# Patient Record
Sex: FEMALE | Race: WHITE | NOT HISPANIC OR LATINO | Employment: OTHER | URBAN - NONMETROPOLITAN AREA
[De-identification: names, ages, dates, MRNs, and addresses within clinical notes are randomized per-mention and may not be internally consistent; named-entity substitution may affect disease eponyms.]

---

## 2017-02-07 ENCOUNTER — OFFICE VISIT (OUTPATIENT)
Dept: OBSTETRICS AND GYNECOLOGY | Facility: CLINIC | Age: 62
End: 2017-02-07

## 2017-02-07 VITALS
WEIGHT: 155 LBS | DIASTOLIC BLOOD PRESSURE: 88 MMHG | SYSTOLIC BLOOD PRESSURE: 140 MMHG | BODY MASS INDEX: 27.46 KG/M2 | HEIGHT: 63 IN

## 2017-02-07 DIAGNOSIS — Z00.00 ANNUAL PHYSICAL EXAM: Primary | ICD-10-CM

## 2017-02-07 DIAGNOSIS — N95.2 VAGINAL ATROPHY: ICD-10-CM

## 2017-02-07 DIAGNOSIS — Z78.9 NONSMOKER: ICD-10-CM

## 2017-02-07 PROCEDURE — 99396 PREV VISIT EST AGE 40-64: CPT | Performed by: OBSTETRICS & GYNECOLOGY

## 2017-02-07 PROCEDURE — 87624 HPV HI-RISK TYP POOLED RSLT: CPT | Performed by: OBSTETRICS & GYNECOLOGY

## 2017-02-07 PROCEDURE — G0123 SCREEN CERV/VAG THIN LAYER: HCPCS | Performed by: OBSTETRICS & GYNECOLOGY

## 2017-02-07 RX ORDER — AMLODIPINE BESYLATE 5 MG/1
5 TABLET ORAL DAILY
COMMUNITY
Start: 2016-12-02 | End: 2019-05-10 | Stop reason: DRUGHIGH

## 2017-02-07 RX ORDER — LEVOTHYROXINE SODIUM 100 MCG
88 TABLET ORAL DAILY
COMMUNITY
Start: 2016-12-02 | End: 2020-03-19

## 2017-02-07 RX ORDER — SPIRONOLACTONE 25 MG/1
25 TABLET ORAL DAILY
COMMUNITY
Start: 2016-12-02 | End: 2020-03-19 | Stop reason: SDUPTHER

## 2017-02-07 NOTE — PROGRESS NOTES
Subjective   Chief Complaint   Patient presents with   • Gynecologic Exam     pt here today for annual exam. pt voices no concerns.      Keshia Fletcher is a 61 y.o. year old No obstetric history on file. menopausal female presenting to be seen for her annual exam.  The patient denies any vaginal bleeding in the past 12 months. Hot flashes and night sweats are not a significant problem.    SEXUAL Hx:  She is sexually active.  In the past year there has not been new sexual partners.  No pain or discomfort with sex, uses Premarin vaginal cream twice weekly.    HEALTH Hx:  She exercises regularly: yes.  She wears her seat belt:not asked.  She has concerns about domestic violence: no.  The patient reports regular self breast exams: yes  She has noticed changes in height: no.    No Additional Complaints Reported    The following portions of the patient's history were reviewed and updated as appropriate:problem list, current medications, allergies, past family history, past medical history, past social history and past surgical history.    Smoking status: Not on file                        Smokeless status: Not on file                       Review of Systems   Constitutional: Negative for activity change and unexpected weight change.   HENT: Negative for congestion.    Respiratory: Negative for shortness of breath.    Cardiovascular: Negative for chest pain.   Gastrointestinal: Negative for abdominal pain, blood in stool, constipation and diarrhea.   Endocrine: Negative for cold intolerance and heat intolerance.   Genitourinary: Negative for difficulty urinating, dyspareunia, dysuria, pelvic pain, vaginal bleeding and vaginal discharge.   Musculoskeletal: Positive for arthralgias, back pain (mild), neck pain and neck stiffness.   Skin: Negative for rash.   Neurological: Negative for dizziness and headaches.   Psychiatric/Behavioral: Negative for sleep disturbance. The patient is not nervous/anxious.          Objective   Visit  "Vitals   • /88   • Ht 63\" (160 cm)   • Wt 155 lb (70.3 kg)   • BMI 27.46 kg/m2     Physical Exam   Constitutional: She is oriented to person, place, and time. She appears well-developed and well-nourished. No distress.   HENT:   Head: Normocephalic and atraumatic.   Eyes: EOM are normal.   Neck: Normal range of motion. Neck supple. No thyromegaly present.   Cardiovascular: Normal rate and regular rhythm.    No murmur heard.  Pulmonary/Chest: Effort normal and breath sounds normal. Right breast exhibits no inverted nipple and no mass. Left breast exhibits no inverted nipple and no mass.   Abdominal: Soft. She exhibits no distension. There is no tenderness.   Genitourinary: Vagina normal and uterus normal. Rectal exam shows anal tone normal. No breast tenderness or discharge. Pelvic exam was performed with patient supine. There is no tenderness or lesion on the right labia. There is no tenderness or lesion on the left labia. Cervix exhibits no motion tenderness and no discharge. Right adnexum displays no tenderness and no fullness. Left adnexum displays no tenderness and no fullness. No bleeding in the vagina. No vaginal discharge found.   Genitourinary Comments: Labia normal, no lesions noted.  Urethral meatus unremarkable, no prolapse of mucosa.  Anus/perineum normal.   Musculoskeletal: Normal range of motion. She exhibits no edema.   Neurological: She is alert and oriented to person, place, and time.   Skin: Skin is warm and dry.   Psychiatric: She has a normal mood and affect. Her behavior is normal. Judgment normal.   Nursing note and vitals reviewed.           Assessment & Plan    Keshia was seen today for gynecologic exam.    Diagnoses and all orders for this visit:    Annual physical exam: Labs per PCP.  Pt reports DEXA was normal only 1-2 years ago and reports colonoscopy is up-to-date.  Exam today unremarkable.  Mammogram being ordered at [facility], per her request.  -     Liquid-based Pap Smear, " Screening    Nonsmoker    BMI 27.0-27.9,adult    Vaginal atrophy  -     conjugated estrogens (PREMARIN) 0.625 MG/GM vaginal cream; Insert  into the vagina Daily. Use 1/2 gram, as directed, two nights weekly      This note was electronically signed.    Gillian Goff MD  2/7/2017  8:24 AM

## 2017-02-09 DIAGNOSIS — N95.2 VAGINAL ATROPHY: ICD-10-CM

## 2017-02-09 LAB
GEN CATEG CVX/VAG CYTO-IMP: NORMAL
LAB AP CASE REPORT: NORMAL
LAB AP GYN ADDITIONAL INFORMATION: NORMAL
LAB AP GYN OTHER FINDINGS: NORMAL
Lab: NORMAL
PATH INTERP SPEC-IMP: NORMAL
STAT OF ADQ CVX/VAG CYTO-IMP: NORMAL

## 2017-03-22 ENCOUNTER — TRANSCRIBE ORDERS (OUTPATIENT)
Dept: LAB | Facility: HOSPITAL | Age: 62
End: 2017-03-22

## 2017-03-22 ENCOUNTER — HOSPITAL ENCOUNTER (OUTPATIENT)
Dept: GENERAL RADIOLOGY | Facility: HOSPITAL | Age: 62
Discharge: HOME | End: 2017-03-22
Admitting: NURSE PRACTITIONER

## 2017-03-22 DIAGNOSIS — M54.5 LOW BACK PAIN, UNSPECIFIED BACK PAIN LATERALITY, UNSPECIFIED CHRONICITY, WITH SCIATICA PRESENCE UNSPECIFIED: Primary | ICD-10-CM

## 2017-03-22 DIAGNOSIS — M54.5 LOW BACK PAIN, UNSPECIFIED BACK PAIN LATERALITY, UNSPECIFIED CHRONICITY, WITH SCIATICA PRESENCE UNSPECIFIED: ICD-10-CM

## 2017-03-22 PROCEDURE — 72110 X-RAY EXAM L-2 SPINE 4/>VWS: CPT

## 2017-04-17 ENCOUNTER — TRANSCRIBE ORDERS (OUTPATIENT)
Dept: ADMINISTRATIVE | Facility: HOSPITAL | Age: 62
End: 2017-04-17

## 2017-04-17 DIAGNOSIS — N63.0 BREAST NODULE: Primary | ICD-10-CM

## 2017-04-21 ENCOUNTER — APPOINTMENT (OUTPATIENT)
Dept: MRI IMAGING | Facility: HOSPITAL | Age: 62
End: 2017-04-21
Attending: SPECIALIST

## 2017-04-24 ENCOUNTER — TRANSCRIBE ORDERS (OUTPATIENT)
Dept: ADMINISTRATIVE | Facility: HOSPITAL | Age: 62
End: 2017-04-24

## 2017-04-24 DIAGNOSIS — C50.912 NEOPLASM OF LEFT BREAST, PRIMARY TUMOR STAGING CATEGORY T4B: ULCERATION AND/OR IPSILATERAL SATELLITE NODULES AND/OR EDEMA (INCLUDING PEAU D'ORANGE) OF SKIN, EXCLUDING INFLAMMATORY CARCINOMA (HCC): Primary | ICD-10-CM

## 2017-04-25 ENCOUNTER — APPOINTMENT (OUTPATIENT)
Dept: MRI IMAGING | Facility: HOSPITAL | Age: 62
End: 2017-04-25
Attending: SPECIALIST

## 2017-04-25 ENCOUNTER — HOSPITAL ENCOUNTER (OUTPATIENT)
Dept: ULTRASOUND IMAGING | Facility: HOSPITAL | Age: 62
Discharge: HOME | End: 2017-04-25
Attending: SPECIALIST

## 2017-04-25 ENCOUNTER — HOSPITAL ENCOUNTER (OUTPATIENT)
Dept: MAMMOGRAPHY | Facility: HOSPITAL | Age: 62
Discharge: HOME | End: 2017-04-25
Attending: SPECIALIST | Admitting: SPECIALIST

## 2017-04-25 DIAGNOSIS — N63.0 BREAST NODULE: ICD-10-CM

## 2017-04-25 DIAGNOSIS — C50.912 NEOPLASM OF LEFT BREAST, PRIMARY TUMOR STAGING CATEGORY T4B: ULCERATION AND/OR IPSILATERAL SATELLITE NODULES AND/OR EDEMA (INCLUDING PEAU D'ORANGE) OF SKIN, EXCLUDING INFLAMMATORY CARCINOMA (HCC): ICD-10-CM

## 2017-04-25 PROCEDURE — G0206 DX MAMMO INCL CAD UNI: HCPCS

## 2017-04-25 PROCEDURE — 76642 ULTRASOUND BREAST LIMITED: CPT

## 2017-04-25 PROCEDURE — G0279 TOMOSYNTHESIS, MAMMO: HCPCS

## 2017-04-26 ENCOUNTER — TRANSCRIBE ORDERS (OUTPATIENT)
Dept: ADMINISTRATIVE | Facility: HOSPITAL | Age: 62
End: 2017-04-26

## 2017-04-26 DIAGNOSIS — N63.0 LUMP OR MASS IN BREAST: Primary | ICD-10-CM

## 2017-05-03 ENCOUNTER — APPOINTMENT (OUTPATIENT)
Dept: LAB | Facility: HOSPITAL | Age: 62
End: 2017-05-03

## 2017-05-03 ENCOUNTER — HOSPITAL ENCOUNTER (OUTPATIENT)
Dept: ULTRASOUND IMAGING | Facility: HOSPITAL | Age: 62
Discharge: HOME | End: 2017-05-03
Attending: SPECIALIST | Admitting: SPECIALIST

## 2017-05-03 ENCOUNTER — HOSPITAL ENCOUNTER (OUTPATIENT)
Dept: MAMMOGRAPHY | Facility: HOSPITAL | Age: 62
Discharge: HOME | End: 2017-05-03
Attending: SPECIALIST

## 2017-05-03 DIAGNOSIS — N63.0 LUMP OR MASS IN BREAST: ICD-10-CM

## 2017-05-03 PROCEDURE — 88305 TISSUE EXAM BY PATHOLOGIST: CPT | Performed by: SPECIALIST

## 2017-05-03 RX ORDER — LIDOCAINE HYDROCHLORIDE 10 MG/ML
10 INJECTION, SOLUTION INFILTRATION; PERINEURAL ONCE
Status: DISCONTINUED | OUTPATIENT
Start: 2017-05-03 | End: 2017-05-25 | Stop reason: HOSPADM

## 2017-05-03 RX ORDER — LIDOCAINE HYDROCHLORIDE AND EPINEPHRINE 10; 10 MG/ML; UG/ML
10 INJECTION, SOLUTION INFILTRATION; PERINEURAL ONCE
Status: DISCONTINUED | OUTPATIENT
Start: 2017-05-03 | End: 2017-05-25

## 2017-05-09 LAB
CYTO UR: NORMAL
LAB AP CASE REPORT: NORMAL
LAB AP CLINICAL INFORMATION: NORMAL
Lab: NORMAL
PATH REPORT.FINAL DX SPEC: NORMAL
PATH REPORT.GROSS SPEC: NORMAL

## 2017-05-22 ENCOUNTER — TRANSCRIBE ORDERS (OUTPATIENT)
Dept: ADMINISTRATIVE | Facility: HOSPITAL | Age: 62
End: 2017-05-22

## 2017-05-22 DIAGNOSIS — N63.0 BREAST NODULE: Primary | ICD-10-CM

## 2017-05-23 ENCOUNTER — APPOINTMENT (OUTPATIENT)
Dept: PREADMISSION TESTING | Facility: HOSPITAL | Age: 62
End: 2017-05-23

## 2017-05-23 VITALS
DIASTOLIC BLOOD PRESSURE: 68 MMHG | HEIGHT: 64 IN | OXYGEN SATURATION: 99 % | RESPIRATION RATE: 14 BRPM | HEART RATE: 72 BPM | SYSTOLIC BLOOD PRESSURE: 148 MMHG

## 2017-05-23 LAB
ALBUMIN SERPL-MCNC: 4.6 G/DL (ref 3.5–5)
ALBUMIN/GLOB SERPL: 1.4 G/DL (ref 1.1–2.5)
ALP SERPL-CCNC: 125 U/L (ref 24–120)
ALT SERPL W P-5'-P-CCNC: 29 U/L (ref 0–54)
ANION GAP SERPL CALCULATED.3IONS-SCNC: 13 MMOL/L (ref 4–13)
AST SERPL-CCNC: 26 U/L (ref 7–45)
BASOPHILS # BLD AUTO: 0.04 10*3/MM3 (ref 0–0.2)
BASOPHILS NFR BLD AUTO: 0.6 % (ref 0–2)
BILIRUB SERPL-MCNC: 0.9 MG/DL (ref 0.1–1)
BUN BLD-MCNC: 14 MG/DL (ref 5–21)
BUN/CREAT SERPL: 14.6 (ref 7–25)
CALCIUM SPEC-SCNC: 10 MG/DL (ref 8.4–10.4)
CHLORIDE SERPL-SCNC: 102 MMOL/L (ref 98–110)
CO2 SERPL-SCNC: 27 MMOL/L (ref 24–31)
CREAT BLD-MCNC: 0.96 MG/DL (ref 0.5–1.4)
DEPRECATED RDW RBC AUTO: 39.4 FL (ref 40–54)
EOSINOPHIL # BLD AUTO: 0.09 10*3/MM3 (ref 0–0.7)
EOSINOPHIL NFR BLD AUTO: 1.4 % (ref 0–4)
ERYTHROCYTE [DISTWIDTH] IN BLOOD BY AUTOMATED COUNT: 12.4 % (ref 12–15)
GFR SERPL CREATININE-BSD FRML MDRD: 59 ML/MIN/1.73
GLOBULIN UR ELPH-MCNC: 3.2 GM/DL
GLUCOSE BLD-MCNC: 91 MG/DL (ref 70–100)
HCT VFR BLD AUTO: 40.7 % (ref 37–47)
HGB BLD-MCNC: 14.1 G/DL (ref 12–16)
IMM GRANULOCYTES # BLD: 0.01 10*3/MM3 (ref 0–0.03)
IMM GRANULOCYTES NFR BLD: 0.2 % (ref 0–5)
LYMPHOCYTES # BLD AUTO: 1.88 10*3/MM3 (ref 0.72–4.86)
LYMPHOCYTES NFR BLD AUTO: 29.1 % (ref 15–45)
MCH RBC QN AUTO: 30.9 PG (ref 28–32)
MCHC RBC AUTO-ENTMCNC: 34.6 G/DL (ref 33–36)
MCV RBC AUTO: 89.1 FL (ref 82–98)
MONOCYTES # BLD AUTO: 0.55 10*3/MM3 (ref 0.19–1.3)
MONOCYTES NFR BLD AUTO: 8.5 % (ref 4–12)
NEUTROPHILS # BLD AUTO: 3.89 10*3/MM3 (ref 1.87–8.4)
NEUTROPHILS NFR BLD AUTO: 60.2 % (ref 39–78)
PLATELET # BLD AUTO: 138 10*3/MM3 (ref 130–400)
PMV BLD AUTO: 10.9 FL (ref 6–12)
POTASSIUM BLD-SCNC: 4.1 MMOL/L (ref 3.5–5.3)
PROT SERPL-MCNC: 7.8 G/DL (ref 6.3–8.7)
RBC # BLD AUTO: 4.57 10*6/MM3 (ref 4.2–5.4)
SODIUM BLD-SCNC: 142 MMOL/L (ref 135–145)
WBC NRBC COR # BLD: 6.46 10*3/MM3 (ref 4.8–10.8)

## 2017-05-23 PROCEDURE — 93010 ELECTROCARDIOGRAM REPORT: CPT | Performed by: INTERNAL MEDICINE

## 2017-05-23 RX ORDER — ACETAMINOPHEN 160 MG
2000 TABLET,DISINTEGRATING ORAL DAILY
COMMUNITY

## 2017-05-23 RX ORDER — FAMOTIDINE 10 MG
10 TABLET ORAL AS NEEDED
COMMUNITY
End: 2021-08-24 | Stop reason: HOSPADM

## 2017-05-25 ENCOUNTER — HOSPITAL ENCOUNTER (OUTPATIENT)
Dept: ULTRASOUND IMAGING | Facility: HOSPITAL | Age: 62
Discharge: HOME | End: 2017-05-25
Attending: SPECIALIST

## 2017-05-25 ENCOUNTER — ANESTHESIA EVENT (OUTPATIENT)
Dept: PERIOP | Facility: HOSPITAL | Age: 62
End: 2017-05-25

## 2017-05-25 ENCOUNTER — HOSPITAL ENCOUNTER (OUTPATIENT)
Facility: HOSPITAL | Age: 62
Setting detail: HOSPITAL OUTPATIENT SURGERY
Discharge: HOME | End: 2017-05-25
Attending: SPECIALIST | Admitting: SPECIALIST

## 2017-05-25 ENCOUNTER — ANESTHESIA (OUTPATIENT)
Dept: PERIOP | Facility: HOSPITAL | Age: 62
End: 2017-05-25

## 2017-05-25 ENCOUNTER — HOSPITAL ENCOUNTER (OUTPATIENT)
Dept: MAMMOGRAPHY | Facility: HOSPITAL | Age: 62
Discharge: HOME | End: 2017-05-25
Attending: SPECIALIST

## 2017-05-25 VITALS
SYSTOLIC BLOOD PRESSURE: 131 MMHG | DIASTOLIC BLOOD PRESSURE: 69 MMHG | TEMPERATURE: 97.4 F | OXYGEN SATURATION: 98 % | RESPIRATION RATE: 16 BRPM | HEART RATE: 54 BPM

## 2017-05-25 DIAGNOSIS — N63.20 LUMP OF BREAST, LEFT: ICD-10-CM

## 2017-05-25 DIAGNOSIS — N63.0 BREAST NODULE: ICD-10-CM

## 2017-05-25 PROCEDURE — 88307 TISSUE EXAM BY PATHOLOGIST: CPT | Performed by: SPECIALIST

## 2017-05-25 PROCEDURE — 25010000002 FENTANYL CITRATE (PF) 250 MCG/5ML SOLUTION: Performed by: NURSE ANESTHETIST, CERTIFIED REGISTERED

## 2017-05-25 PROCEDURE — 25010000002 PROPOFOL 10 MG/ML EMULSION: Performed by: NURSE ANESTHETIST, CERTIFIED REGISTERED

## 2017-05-25 PROCEDURE — 76098 X-RAY EXAM SURGICAL SPECIMEN: CPT

## 2017-05-25 PROCEDURE — 25010000002 MIDAZOLAM PER 1 MG: Performed by: ANESTHESIOLOGY

## 2017-05-25 PROCEDURE — 88342 IMHCHEM/IMCYTCHM 1ST ANTB: CPT | Performed by: SPECIALIST

## 2017-05-25 RX ORDER — MIDAZOLAM HYDROCHLORIDE 1 MG/ML
1 INJECTION INTRAMUSCULAR; INTRAVENOUS
Status: DISCONTINUED | OUTPATIENT
Start: 2017-05-25 | End: 2017-05-25 | Stop reason: HOSPADM

## 2017-05-25 RX ORDER — MAGNESIUM HYDROXIDE 1200 MG/15ML
LIQUID ORAL AS NEEDED
Status: DISCONTINUED | OUTPATIENT
Start: 2017-05-25 | End: 2017-05-25 | Stop reason: HOSPADM

## 2017-05-25 RX ORDER — HYDROCODONE BITARTRATE AND ACETAMINOPHEN 5; 325 MG/1; MG/1
1-2 TABLET ORAL EVERY 4 HOURS PRN
Qty: 30 TABLET | Refills: 0 | Status: SHIPPED | OUTPATIENT
Start: 2017-05-25 | End: 2018-02-08

## 2017-05-25 RX ORDER — METOCLOPRAMIDE HYDROCHLORIDE 5 MG/ML
5 INJECTION INTRAMUSCULAR; INTRAVENOUS
Status: DISCONTINUED | OUTPATIENT
Start: 2017-05-25 | End: 2017-05-25 | Stop reason: HOSPADM

## 2017-05-25 RX ORDER — HYDRALAZINE HYDROCHLORIDE 20 MG/ML
5 INJECTION INTRAMUSCULAR; INTRAVENOUS
Status: DISCONTINUED | OUTPATIENT
Start: 2017-05-25 | End: 2017-05-25 | Stop reason: HOSPADM

## 2017-05-25 RX ORDER — MIDAZOLAM HYDROCHLORIDE 1 MG/ML
2 INJECTION INTRAMUSCULAR; INTRAVENOUS
Status: DISCONTINUED | OUTPATIENT
Start: 2017-05-25 | End: 2017-05-25 | Stop reason: HOSPADM

## 2017-05-25 RX ORDER — FLUMAZENIL 0.1 MG/ML
0.2 INJECTION INTRAVENOUS AS NEEDED
Status: DISCONTINUED | OUTPATIENT
Start: 2017-05-25 | End: 2017-05-25 | Stop reason: HOSPADM

## 2017-05-25 RX ORDER — MEPERIDINE HYDROCHLORIDE 25 MG/ML
12.5 INJECTION INTRAMUSCULAR; INTRAVENOUS; SUBCUTANEOUS
Status: DISCONTINUED | OUTPATIENT
Start: 2017-05-25 | End: 2017-05-25 | Stop reason: HOSPADM

## 2017-05-25 RX ORDER — SODIUM CHLORIDE 0.9 % (FLUSH) 0.9 %
1-10 SYRINGE (ML) INJECTION AS NEEDED
Status: DISCONTINUED | OUTPATIENT
Start: 2017-05-25 | End: 2017-05-25 | Stop reason: HOSPADM

## 2017-05-25 RX ORDER — SODIUM CHLORIDE, SODIUM LACTATE, POTASSIUM CHLORIDE, CALCIUM CHLORIDE 600; 310; 30; 20 MG/100ML; MG/100ML; MG/100ML; MG/100ML
100 INJECTION, SOLUTION INTRAVENOUS CONTINUOUS
Status: DISCONTINUED | OUTPATIENT
Start: 2017-05-25 | End: 2017-05-25 | Stop reason: HOSPADM

## 2017-05-25 RX ORDER — FENTANYL CITRATE 50 UG/ML
INJECTION, SOLUTION INTRAMUSCULAR; INTRAVENOUS AS NEEDED
Status: DISCONTINUED | OUTPATIENT
Start: 2017-05-25 | End: 2017-05-25 | Stop reason: SURG

## 2017-05-25 RX ORDER — LABETALOL HYDROCHLORIDE 5 MG/ML
5 INJECTION, SOLUTION INTRAVENOUS
Status: DISCONTINUED | OUTPATIENT
Start: 2017-05-25 | End: 2017-05-25 | Stop reason: HOSPADM

## 2017-05-25 RX ORDER — IPRATROPIUM BROMIDE AND ALBUTEROL SULFATE 2.5; .5 MG/3ML; MG/3ML
3 SOLUTION RESPIRATORY (INHALATION) ONCE AS NEEDED
Status: DISCONTINUED | OUTPATIENT
Start: 2017-05-25 | End: 2017-05-25 | Stop reason: HOSPADM

## 2017-05-25 RX ORDER — BUPIVACAINE HYDROCHLORIDE AND EPINEPHRINE 2.5; 5 MG/ML; UG/ML
INJECTION, SOLUTION INFILTRATION; PERINEURAL AS NEEDED
Status: DISCONTINUED | OUTPATIENT
Start: 2017-05-25 | End: 2017-05-25 | Stop reason: HOSPADM

## 2017-05-25 RX ORDER — NALOXONE HCL 0.4 MG/ML
0.04 VIAL (ML) INJECTION AS NEEDED
Status: DISCONTINUED | OUTPATIENT
Start: 2017-05-25 | End: 2017-05-25 | Stop reason: HOSPADM

## 2017-05-25 RX ORDER — LIDOCAINE HYDROCHLORIDE 20 MG/ML
INJECTION, SOLUTION INFILTRATION; PERINEURAL AS NEEDED
Status: DISCONTINUED | OUTPATIENT
Start: 2017-05-25 | End: 2017-05-25 | Stop reason: SURG

## 2017-05-25 RX ORDER — HYDROCODONE BITARTRATE AND ACETAMINOPHEN 5; 325 MG/1; MG/1
1 TABLET ORAL EVERY 4 HOURS PRN
Status: DISCONTINUED | OUTPATIENT
Start: 2017-05-25 | End: 2017-05-25 | Stop reason: HOSPADM

## 2017-05-25 RX ORDER — ONDANSETRON 2 MG/ML
4 INJECTION INTRAMUSCULAR; INTRAVENOUS AS NEEDED
Status: DISCONTINUED | OUTPATIENT
Start: 2017-05-25 | End: 2017-05-25 | Stop reason: HOSPADM

## 2017-05-25 RX ORDER — PROPOFOL 10 MG/ML
VIAL (ML) INTRAVENOUS AS NEEDED
Status: DISCONTINUED | OUTPATIENT
Start: 2017-05-25 | End: 2017-05-25 | Stop reason: SURG

## 2017-05-25 RX ADMIN — CEFAZOLIN 1 G: 1 INJECTION, POWDER, FOR SOLUTION INTRAMUSCULAR; INTRAVENOUS; PARENTERAL at 11:22

## 2017-05-25 RX ADMIN — PROPOFOL 200 MG: 10 INJECTION, EMULSION INTRAVENOUS at 11:23

## 2017-05-25 RX ADMIN — SODIUM CHLORIDE, POTASSIUM CHLORIDE, SODIUM LACTATE AND CALCIUM CHLORIDE 100 ML/HR: 600; 310; 30; 20 INJECTION, SOLUTION INTRAVENOUS at 10:42

## 2017-05-25 RX ADMIN — LIDOCAINE HYDROCHLORIDE 0.5 ML: 10 INJECTION, SOLUTION EPIDURAL; INFILTRATION; INTRACAUDAL; PERINEURAL at 10:42

## 2017-05-25 RX ADMIN — FENTANYL CITRATE 50 MCG: 50 INJECTION INTRAMUSCULAR; INTRAVENOUS at 12:03

## 2017-05-25 RX ADMIN — LIDOCAINE HYDROCHLORIDE 100 MG: 20 INJECTION, SOLUTION INFILTRATION; PERINEURAL at 11:23

## 2017-05-25 RX ADMIN — FENTANYL CITRATE 150 MCG: 50 INJECTION INTRAMUSCULAR; INTRAVENOUS at 11:23

## 2017-05-25 RX ADMIN — MIDAZOLAM HYDROCHLORIDE 2 MG: 1 INJECTION, SOLUTION INTRAMUSCULAR; INTRAVENOUS at 10:42

## 2017-05-25 RX ADMIN — FENTANYL CITRATE 50 MCG: 50 INJECTION INTRAMUSCULAR; INTRAVENOUS at 12:25

## 2018-02-08 ENCOUNTER — OFFICE VISIT (OUTPATIENT)
Dept: OBSTETRICS AND GYNECOLOGY | Facility: CLINIC | Age: 63
End: 2018-02-08

## 2018-02-08 VITALS
HEIGHT: 64 IN | DIASTOLIC BLOOD PRESSURE: 82 MMHG | BODY MASS INDEX: 27.14 KG/M2 | WEIGHT: 159 LBS | SYSTOLIC BLOOD PRESSURE: 134 MMHG

## 2018-02-08 DIAGNOSIS — Z78.9 NONSMOKER: ICD-10-CM

## 2018-02-08 DIAGNOSIS — Z98.890 S/P LUMPECTOMY, LEFT BREAST: ICD-10-CM

## 2018-02-08 DIAGNOSIS — N95.2 VAGINAL ATROPHY: ICD-10-CM

## 2018-02-08 DIAGNOSIS — Z00.00 ANNUAL PHYSICAL EXAM: Primary | ICD-10-CM

## 2018-02-08 PROCEDURE — 99396 PREV VISIT EST AGE 40-64: CPT | Performed by: OBSTETRICS & GYNECOLOGY

## 2018-02-08 PROCEDURE — 87624 HPV HI-RISK TYP POOLED RSLT: CPT | Performed by: OBSTETRICS & GYNECOLOGY

## 2018-02-08 PROCEDURE — G0123 SCREEN CERV/VAG THIN LAYER: HCPCS | Performed by: OBSTETRICS & GYNECOLOGY

## 2018-02-08 NOTE — PROGRESS NOTES
Subjective   Chief Complaint   Patient presents with   • Gynecologic Exam     pt here today for annual exam. pt voices no concerns.      Keshia Fletcher is a 62 y.o. year old  menopausal female presenting to be seen for her annual exam.  Overall, patient reports to be feeling well.  She had a lumpectomy after her annual exam last year, and reports that tissue was benign.  The patient denies any vaginal bleeding in the past 12 months. Hot flashes and night sweats are not a significant problem.    SEXUAL Hx:  She is sexually active.  In the past year there has not been new sexual partners.      HEALTH Hx:  She exercises regularly: yes.  The patient reports regular self breast exams: yes  She has noticed changes in height: no.    No Additional Complaints Reported    The following portions of the patient's history were reviewed and updated as appropriate:problem list, current medications, allergies, past family history, past medical history, past social history and past surgical history.    Smoking status: Never Smoker                                                              Smokeless status: Never Used                        Review of Systems   Constitutional: Negative for unexpected weight change.   Respiratory: Negative for shortness of breath.    Cardiovascular: Negative for chest pain.   Gastrointestinal: Positive for blood in stool (occasionally with hemorrhoids) and constipation (occasionally). Negative for abdominal pain and diarrhea.   Endocrine: Negative for cold intolerance and heat intolerance.   Genitourinary: Positive for enuresis (GEOVANNI, wears pads every day). Negative for difficulty urinating, dyspareunia, vaginal bleeding and vaginal discharge.   Musculoskeletal: Positive for arthralgias (arthritis). Negative for back pain.   Skin: Negative for rash.   Neurological: Negative for dizziness and headaches.   Psychiatric/Behavioral: Negative for dysphoric mood and sleep disturbance. The patient is not  "nervous/anxious.          Objective   /82  Ht 162.6 cm (64\")  Wt 72.1 kg (159 lb)  BMI 27.29 kg/m2  Physical Exam   Constitutional: She is oriented to person, place, and time. She appears well-developed and well-nourished. No distress.   HENT:   Head: Normocephalic and atraumatic.   Eyes: EOM are normal.   Neck: Normal range of motion. Neck supple.   Cardiovascular: Normal rate and regular rhythm.    No murmur heard.  Pulmonary/Chest: Effort normal and breath sounds normal. Right breast exhibits no inverted nipple and no mass. Left breast exhibits no inverted nipple and no mass.   Abdominal: Soft. She exhibits no distension. There is no tenderness.   Genitourinary: Vagina normal and uterus normal. No breast tenderness or discharge. Pelvic exam was performed with patient supine. There is no tenderness or lesion on the right labia. There is no tenderness or lesion on the left labia. Cervix exhibits no motion tenderness, no discharge and no friability. Right adnexum displays no tenderness and no fullness. Left adnexum displays no tenderness and no fullness. No tenderness or bleeding in the vagina. No vaginal discharge found.   Genitourinary Comments: Vaginal mucosa and cx pale with atrophy, but otherwise unremarkable   Musculoskeletal: Normal range of motion. She exhibits no edema.   Neurological: She is alert and oriented to person, place, and time.   Skin: Skin is warm and dry.   Psychiatric: She has a normal mood and affect. Her behavior is normal. Judgment normal.   Nursing note and vitals reviewed.         Assessment & Plan    Keshia was seen today for gynecologic exam.    Diagnoses and all orders for this visit:    Annual physical exam: Colonoscopy up-to-date.  Osteopenia on bone density in 2017 - encouraged weight-bearing exercise and Ca++.  Labs with PCP.  Mammogram ordered.  PAP collected, patient declined addition of STD testing.  Exam unremarkable.  -     Liquid-based Pap Smear, Screening - ThinPrep " Vial, Cervix  -     Mammo Screening Digital Tomosynthesis Bilateral With CAD; Future    Nonsmoker    BMI 27.0-27.9,adult    S/P lumpectomy, left breast: Patient was advised by Dr. Honeycutt not to use Premarin vaginal cream.    Comments:  sees Dr. Dianne Honeycutt every 6 months    Vaginal atrophy: Patient advised that gynecologists feel Premarin vaginal cream safe, even for patients with cancer, but was given brochure for MLT as a alternative treatment.      This note was electronically signed.    Gillian Goff MD  2/8/2018  8:57 AM

## 2018-02-16 LAB
GEN CATEG CVX/VAG CYTO-IMP: ABNORMAL
LAB AP CASE REPORT: ABNORMAL
LAB AP GYN ADDITIONAL INFORMATION: ABNORMAL
LAB AP GYN OTHER FINDINGS: ABNORMAL
Lab: ABNORMAL
PATH INTERP SPEC-IMP: ABNORMAL
STAT OF ADQ CVX/VAG CYTO-IMP: ABNORMAL

## 2019-02-13 ENCOUNTER — OFFICE VISIT (OUTPATIENT)
Dept: OBSTETRICS AND GYNECOLOGY | Facility: CLINIC | Age: 64
End: 2019-02-13

## 2019-02-13 VITALS
SYSTOLIC BLOOD PRESSURE: 116 MMHG | BODY MASS INDEX: 27.11 KG/M2 | HEIGHT: 63 IN | DIASTOLIC BLOOD PRESSURE: 80 MMHG | WEIGHT: 153 LBS

## 2019-02-13 DIAGNOSIS — R87.619 ABNORMAL CYTOLOGICAL FINDING IN SPECIMEN FROM CERVIX UTERI: ICD-10-CM

## 2019-02-13 DIAGNOSIS — Z00.00 ANNUAL PHYSICAL EXAM: ICD-10-CM

## 2019-02-13 DIAGNOSIS — Z78.9 NONSMOKER: ICD-10-CM

## 2019-02-13 DIAGNOSIS — N39.3 SUI (STRESS URINARY INCONTINENCE, FEMALE): ICD-10-CM

## 2019-02-13 DIAGNOSIS — Z78.0 MENOPAUSE: Primary | ICD-10-CM

## 2019-02-13 PROCEDURE — G0101 CA SCREEN;PELVIC/BREAST EXAM: HCPCS | Performed by: OBSTETRICS & GYNECOLOGY

## 2019-02-13 PROCEDURE — 87624 HPV HI-RISK TYP POOLED RSLT: CPT | Performed by: OBSTETRICS & GYNECOLOGY

## 2019-02-13 PROCEDURE — G0123 SCREEN CERV/VAG THIN LAYER: HCPCS | Performed by: OBSTETRICS & GYNECOLOGY

## 2019-02-13 PROCEDURE — 88142 CYTOPATH C/V THIN LAYER: CPT | Performed by: OBSTETRICS & GYNECOLOGY

## 2019-02-13 RX ORDER — IBUPROFEN 800 MG/1
800 TABLET ORAL AS NEEDED
COMMUNITY
Start: 2019-02-02 | End: 2020-03-19 | Stop reason: SDUPTHER

## 2019-02-13 RX ORDER — TIZANIDINE HYDROCHLORIDE 4 MG/1
4 TABLET ORAL AS NEEDED
COMMUNITY
End: 2020-11-30

## 2019-02-13 NOTE — PROGRESS NOTES
Subjective   Chief Complaint   Patient presents with   • Annual Exam     pt here today for annual exam. pt voices no concerns.     Keshia Fletcher is a 63 y.o. year old  menopausal female presenting to be seen for her annual exam.  Overall, patient reports to be feeling well; she has been trying to walk regularly and get 10,000 steps/day.  The patient denies any vaginal bleeding in the past 12 months. Hot flashes and night sweats are not a significant problem.  Patient still following up with Dr. Dianne Honeycutt for previous breast biopsy, but that was benign ().    SEXUAL Hx:  She is sexually active.  In the past year there has not been new sexual partners.      HEALTH Hx:  She exercises regularly: yes.  The patient reports regular self breast exams: yes  She has noticed changes in height: no.    No Additional Complaints Reported    The following portions of the patient's history were reviewed and updated as appropriate:problem list, current medications, allergies, past family history, past medical history, past social history and past surgical history.    Social History    Tobacco Use      Smoking status: Never Smoker      Smokeless tobacco: Never Used    Review of Systems   Constitutional: Negative for activity change and unexpected weight change.   Respiratory: Negative for shortness of breath.    Cardiovascular: Negative for chest pain.   Gastrointestinal: Negative for abdominal pain, blood in stool, constipation and diarrhea.   Endocrine: Negative for cold intolerance and heat intolerance.   Genitourinary: Positive for dyspareunia (only with dryness, good results with OTC lubricants) and enuresis (GEOVANNI, wears pads every day). Negative for difficulty urinating, dysuria, pelvic pain, vaginal bleeding and vaginal discharge.   Musculoskeletal: Positive for arthralgias (arthritis in hands and wrists) and back pain.   Skin: Negative for rash.   Neurological: Negative for dizziness and headaches.  "  Psychiatric/Behavioral: Positive for sleep disturbance (sometimes). Negative for dysphoric mood. The patient is not nervous/anxious.          Objective   /80   Ht 160 cm (63\")   Wt 69.4 kg (153 lb)   BMI 27.10 kg/m²   Physical Exam   Constitutional: She is oriented to person, place, and time. She appears well-developed and well-nourished. No distress.   HENT:   Head: Normocephalic and atraumatic.   Eyes: EOM are normal.   Neck: Normal range of motion. Neck supple.   Cardiovascular: Normal rate and regular rhythm.   No murmur heard.  Pulmonary/Chest: Effort normal and breath sounds normal. Right breast exhibits no inverted nipple and no mass. Left breast exhibits no inverted nipple and no mass.   Abdominal: Soft. She exhibits no distension. There is no tenderness.   Genitourinary: Vagina normal and uterus normal. No breast tenderness or discharge. Pelvic exam was performed with patient supine. There is no tenderness or lesion on the right labia. There is no tenderness or lesion on the left labia. Cervix exhibits no motion tenderness, no discharge and no friability. Right adnexum displays no tenderness and no fullness. Left adnexum displays no tenderness and no fullness. No tenderness or bleeding in the vagina. No vaginal discharge found.   Genitourinary Comments: Vaginal mucosa and cx pale with atrophy, but otherwise unremarkable   Musculoskeletal: Normal range of motion. She exhibits no edema.   Neurological: She is alert and oriented to person, place, and time.   Skin: Skin is warm and dry.   Psychiatric: She has a normal mood and affect. Her behavior is normal. Judgment normal.   Nursing note and vitals reviewed.         Assessment & Plan    Keshia was seen today for gynecologic exam.    Diagnoses and all orders for this visit:    Annual physical exam: Colonoscopy up-to-date.  Osteopenia on bone density in 2017, new DEXA ordered to be done same day  Mammogram already scheduled.  PAP collected.  Exam " unremarkable.  -     Liquid-based Pap Smear, Screening - ThinPrep Vial, Cervix  - Routine screening labs ordered    Nonsmoker    GEOVANNI: Patient has been offered physical therapy again, but is still skeptical.  She is wearing a pad every day, but does not feel that the problem is significant.    BMI 27.0-27.9,adult: Patient walking 10,000 steps daily    S/P lumpectomy, left breast: Patient was advised by Dr. Honeycutt not to use Premarin vaginal cream.    Comments:  sees Dr. Dianne Honeycutt every 6 months    Vaginal atrophy: Using OTC lubricants      This note was electronically signed.    Gillian Goff MD  2/13/2019  9:03 AM

## 2019-02-15 LAB
GEN CATEG CVX/VAG CYTO-IMP: ABNORMAL
HPV I/H RISK 4 DNA CVX QL PROBE+SIG AMP: NOT DETECTED
LAB AP CASE REPORT: ABNORMAL
LAB AP GYN ADDITIONAL INFORMATION: ABNORMAL
LAB AP GYN OTHER FINDINGS: ABNORMAL
PATH INTERP SPEC-IMP: ABNORMAL
STAT OF ADQ CVX/VAG CYTO-IMP: ABNORMAL

## 2019-02-18 NOTE — PROGRESS NOTES
Please let patient know that her PAP does not require any follow-up at this time.  Will repeat in one year.  Thx

## 2019-02-19 NOTE — PROGRESS NOTES
Pt notified that pap did not need any follow up at this time. Sent pt to scheduling to set up her yearly for next year. NATALIE

## 2019-05-07 DIAGNOSIS — Z78.0 MENOPAUSE: ICD-10-CM

## 2019-05-08 NOTE — PROGRESS NOTES
Please let patient know that there has been loss of bone density over the past two years, although still only osteopenia - not osteoporosis.  I recommend Ca++ with vitamin D, but also feel like patient would benefit from bisphosphonate therapy since there has been loss over a short time period.  We can either send her Actonel, if she wants to do that, or she can make an appointment o come in and talk about it.  Thx

## 2019-05-09 NOTE — PROGRESS NOTES
Contacted pt regarding her bone density results. Told pt that you recommended Ca with vitamin d. Also pt says that she wants to come in to discuss Actonel. Sent her to scheduling to set up an appointment. NATALIE

## 2019-05-10 ENCOUNTER — OFFICE VISIT (OUTPATIENT)
Dept: OBSTETRICS AND GYNECOLOGY | Facility: CLINIC | Age: 64
End: 2019-05-10

## 2019-05-10 VITALS
HEIGHT: 63 IN | WEIGHT: 151 LBS | SYSTOLIC BLOOD PRESSURE: 130 MMHG | BODY MASS INDEX: 26.75 KG/M2 | DIASTOLIC BLOOD PRESSURE: 72 MMHG

## 2019-05-10 DIAGNOSIS — M85.80 OSTEOPENIA, UNSPECIFIED LOCATION: Primary | ICD-10-CM

## 2019-05-10 DIAGNOSIS — R60.0 PEDAL EDEMA: ICD-10-CM

## 2019-05-10 PROCEDURE — 99213 OFFICE O/P EST LOW 20 MIN: CPT | Performed by: OBSTETRICS & GYNECOLOGY

## 2019-05-10 RX ORDER — AMLODIPINE BESYLATE 10 MG/1
10 TABLET ORAL DAILY
COMMUNITY
Start: 2019-03-18 | End: 2021-01-04 | Stop reason: SDUPTHER

## 2019-05-10 NOTE — PROGRESS NOTES
"Subjective   Chief Complaint   Patient presents with   • bone density     pt here today to discuss bone density results. pt says that her feet have been swelling for the past week. pt voices no other concerns.      Keshia Fletcher is a 64 y.o. year old .  No LMP recorded. Patient is postmenopausal.  She presents to be seen because of worsening osteopenia on DEXA.  T score went from -1.5 in 2017 to -2.0 this year.   Patient had previously been on Ca++ with vitamin D, but somewhere along the way had stopped.  She will resume those.  Patient walking every morning M-F, 3 miles, but does not weight training/resistance training.    Additionally, patient feels like both of her ankles are a little swollen.  She just took a long bus trip last week and says they are improving, but not back to her normal yet.  No calf pain with walking.    The following portions of the patient's history were reviewed and updated as appropriate:current medications and allergies    Social History    Tobacco Use      Smoking status: Never Smoker      Smokeless tobacco: Never Used    Review of Systems   Constitutional: Negative for activity change and unexpected weight change.   Respiratory: Negative for shortness of breath.    Cardiovascular: Negative for chest pain.   Musculoskeletal: Negative for arthralgias, back pain, neck pain and neck stiffness.         Objective   /72   Ht 160 cm (63\")   Wt 68.5 kg (151 lb)   BMI 26.75 kg/m²     Physical Exam   Constitutional: She is oriented to person, place, and time. She appears well-developed and well-nourished. No distress.   HENT:   Head: Normocephalic and atraumatic.   Eyes: EOM are normal.   Neck: Normal range of motion.   Pulmonary/Chest: Effort normal.   Musculoskeletal: Normal range of motion.   Neurological: She is alert and oriented to person, place, and time.   Skin: Skin is warm and dry.   Psychiatric: She has a normal mood and affect. Her behavior is normal. Judgment normal. "   Nursing note and vitals reviewed.      Lab Review   No data reviewed    Imaging   DEXA     Assessment & Plan    Keshia was seen today for bone density.    Diagnoses and all orders for this visit:    Osteopenia, unspecified location: Patient with worsening from T score of -1.5 (2017) to -2.0 (2019).  She would like to avoid bisphosphonates for as long as possible after discussion of possible side effects.   Patient will begin using a weighted vest during her walk to add weight-bearing exercise to her regimen and also resume Ca++ with vitamin D.  Repeat DEXA in 2021    Pedal edema: Likely related to more Na++ while traveling.  Encouraged her to be diligent about drinking water since she says that it is already improving    BMI 26.0-26.9,adult      This note was electronically signed.    Gillian Goff MD  May 10, 2019  8:27 AM    Total time spent today with Keshia  was 20 minutes (level 3).  Greater than 50% of the time was spent coordinating care, answering her questions and counseling regarding pathophysiology of her presenting problem along with plans for any diagnositc work-up and treatment.

## 2020-01-16 ENCOUNTER — TRANSCRIBE ORDERS (OUTPATIENT)
Dept: ADMINISTRATIVE | Facility: HOSPITAL | Age: 65
End: 2020-01-16

## 2020-01-16 DIAGNOSIS — R01.1 HEART MURMUR: Primary | ICD-10-CM

## 2020-01-24 ENCOUNTER — HOSPITAL ENCOUNTER (OUTPATIENT)
Dept: CARDIOLOGY | Facility: HOSPITAL | Age: 65
Discharge: HOME | End: 2020-01-24
Admitting: INTERNAL MEDICINE

## 2020-01-24 VITALS
DIASTOLIC BLOOD PRESSURE: 55 MMHG | WEIGHT: 151 LBS | BODY MASS INDEX: 26.75 KG/M2 | SYSTOLIC BLOOD PRESSURE: 148 MMHG | HEIGHT: 63 IN

## 2020-01-24 LAB
BH CV ECHO MEAS - AO MAX PG (FULL): 4.9 MMHG
BH CV ECHO MEAS - AO MAX PG: 10.1 MMHG
BH CV ECHO MEAS - AO MEAN PG (FULL): 3 MMHG
BH CV ECHO MEAS - AO MEAN PG: 6 MMHG
BH CV ECHO MEAS - AO ROOT AREA (BSA CORRECTED): 1.9
BH CV ECHO MEAS - AO ROOT AREA: 8.6 CM^2
BH CV ECHO MEAS - AO ROOT DIAM: 3.3 CM
BH CV ECHO MEAS - AO V2 MAX: 159 CM/SEC
BH CV ECHO MEAS - AO V2 MEAN: 111 CM/SEC
BH CV ECHO MEAS - AO V2 VTI: 40 CM
BH CV ECHO MEAS - AVA(I,A): 2.2 CM^2
BH CV ECHO MEAS - AVA(I,D): 2.2 CM^2
BH CV ECHO MEAS - AVA(V,A): 2.3 CM^2
BH CV ECHO MEAS - AVA(V,D): 2.3 CM^2
BH CV ECHO MEAS - BSA(HAYCOCK): 1.8 M^2
BH CV ECHO MEAS - BSA: 1.7 M^2
BH CV ECHO MEAS - BZI_BMI: 26.7 KILOGRAMS/M^2
BH CV ECHO MEAS - BZI_METRIC_HEIGHT: 160 CM
BH CV ECHO MEAS - BZI_METRIC_WEIGHT: 68.5 KG
BH CV ECHO MEAS - EDV(CUBED): 69.4 ML
BH CV ECHO MEAS - EDV(MOD-SP4): 94.4 ML
BH CV ECHO MEAS - EDV(TEICH): 74.7 ML
BH CV ECHO MEAS - EF(CUBED): 80.1 %
BH CV ECHO MEAS - EF(MOD-SP4): 74.5 %
BH CV ECHO MEAS - EF(TEICH): 73 %
BH CV ECHO MEAS - ESV(CUBED): 13.8 ML
BH CV ECHO MEAS - ESV(MOD-SP4): 24.1 ML
BH CV ECHO MEAS - ESV(TEICH): 20.2 ML
BH CV ECHO MEAS - FS: 41.6 %
BH CV ECHO MEAS - IVS/LVPW: 0.93
BH CV ECHO MEAS - IVSD: 1 CM
BH CV ECHO MEAS - LA DIMENSION: 3.2 CM
BH CV ECHO MEAS - LA/AO: 0.97
BH CV ECHO MEAS - LAT PEAK E' VEL: 5.6 CM/SEC
BH CV ECHO MEAS - LV DIASTOLIC VOL/BSA (35-75): 55 ML/M^2
BH CV ECHO MEAS - LV MASS(C)D: 139.2 GRAMS
BH CV ECHO MEAS - LV MASS(C)DI: 81.1 GRAMS/M^2
BH CV ECHO MEAS - LV MAX PG: 5.2 MMHG
BH CV ECHO MEAS - LV MEAN PG: 3 MMHG
BH CV ECHO MEAS - LV SYSTOLIC VOL/BSA (12-30): 14 ML/M^2
BH CV ECHO MEAS - LV V1 MAX: 114 CM/SEC
BH CV ECHO MEAS - LV V1 MEAN: 75.3 CM/SEC
BH CV ECHO MEAS - LV V1 VTI: 28.4 CM
BH CV ECHO MEAS - LVIDD: 4.1 CM
BH CV ECHO MEAS - LVIDS: 2.4 CM
BH CV ECHO MEAS - LVLD AP4: 7.8 CM
BH CV ECHO MEAS - LVLS AP4: 6.9 CM
BH CV ECHO MEAS - LVOT AREA (M): 3.1 CM^2
BH CV ECHO MEAS - LVOT AREA: 3.1 CM^2
BH CV ECHO MEAS - LVOT DIAM: 2 CM
BH CV ECHO MEAS - LVPWD: 1.1 CM
BH CV ECHO MEAS - MED PEAK E' VEL: 5.77 CM/SEC
BH CV ECHO MEAS - MV A MAX VEL: 116 CM/SEC
BH CV ECHO MEAS - MV DEC SLOPE: 388 CM/SEC^2
BH CV ECHO MEAS - MV DEC TIME: 0.25 SEC
BH CV ECHO MEAS - MV E MAX VEL: 97 CM/SEC
BH CV ECHO MEAS - MV E/A: 0.84
BH CV ECHO MEAS - SI(AO): 199.4 ML/M^2
BH CV ECHO MEAS - SI(CUBED): 32.4 ML/M^2
BH CV ECHO MEAS - SI(LVOT): 52 ML/M^2
BH CV ECHO MEAS - SI(MOD-SP4): 41 ML/M^2
BH CV ECHO MEAS - SI(TEICH): 31.8 ML/M^2
BH CV ECHO MEAS - SV(AO): 342.1 ML
BH CV ECHO MEAS - SV(CUBED): 55.6 ML
BH CV ECHO MEAS - SV(LVOT): 89.2 ML
BH CV ECHO MEAS - SV(MOD-SP4): 70.3 ML
BH CV ECHO MEAS - SV(TEICH): 54.5 ML
BH CV ECHO MEASUREMENTS AVERAGE E/E' RATIO: 17.06
LEFT ATRIUM VOLUME INDEX: 33.1 ML/M2
LEFT ATRIUM VOLUME: 57 CM3
MAXIMAL PREDICTED HEART RATE: 154 BPM
STRESS TARGET HR: 131 BPM

## 2020-01-24 PROCEDURE — 93306 TTE W/DOPPLER COMPLETE: CPT | Performed by: INTERNAL MEDICINE

## 2020-01-24 PROCEDURE — 93306 TTE W/DOPPLER COMPLETE: CPT

## 2020-03-02 ENCOUNTER — OFFICE VISIT (OUTPATIENT)
Dept: GASTROENTEROLOGY | Facility: CLINIC | Age: 65
End: 2020-03-02

## 2020-03-02 VITALS
SYSTOLIC BLOOD PRESSURE: 132 MMHG | DIASTOLIC BLOOD PRESSURE: 78 MMHG | WEIGHT: 155 LBS | HEART RATE: 68 BPM | OXYGEN SATURATION: 97 % | BODY MASS INDEX: 26.46 KG/M2 | HEIGHT: 64 IN

## 2020-03-02 DIAGNOSIS — Z86.010 HX OF COLONIC POLYPS: Primary | ICD-10-CM

## 2020-03-02 DIAGNOSIS — I10 HTN (HYPERTENSION), BENIGN: ICD-10-CM

## 2020-03-02 PROBLEM — Z86.0100 HX OF COLONIC POLYPS: Status: ACTIVE | Noted: 2020-03-02

## 2020-03-02 PROCEDURE — S0260 H&P FOR SURGERY: HCPCS | Performed by: CLINICAL NURSE SPECIALIST

## 2020-03-02 RX ORDER — SODIUM, POTASSIUM,MAG SULFATES 17.5-3.13G
SOLUTION, RECONSTITUTED, ORAL ORAL
Qty: 2 BOTTLE | Refills: 0 | Status: SHIPPED | OUTPATIENT
Start: 2020-03-02 | End: 2020-03-19

## 2020-03-02 NOTE — PROGRESS NOTES
Keshia Fletcher  1955      3/2/2020  Chief Complaint   Patient presents with   • Colonoscopy     Subjective   HPI  Keshia Fletcher is a 64 y.o. female who presents as a referral for preventative maintenance. She has no complaints of nausea or vomiting. No change in bowels. No wt loss. No BRBPR. No melena. There is NO family hx for colon cancer. No abdominal pain.  Past Medical History:   Diagnosis Date   • Acid reflux    • Arthritis    • Heart murmur    • Hx of colonic polyp    • Hypertension    • Hypothyroidism      Past Surgical History:   Procedure Laterality Date   • APPENDECTOMY  2007   • BREAST BIOPSY Left 5/25/2017    Procedure: LEFT ULTRASOUND GUIDED NEEDLE DIRECT EXCISIONAL BIOPSY, MAMMOGRAM TO FOLLOW, BREAST;  Surgeon: Dianne Honeycutt MD;  Location: [facility] OR;  Service:    • COLONOSCOPY W/ POLYPECTOMY  04/16/2015    2 Hyperplastic polyps at 30 cm repeat exam in 5 years   • DILATION AND CURETTAGE, DIAGNOSTIC / THERAPEUTIC  1992, 2016    X 2    • LASIK Right      Outpatient Medications Marked as Taking for the 3/2/20 encounter (Office Visit) with Antonina Neri APRN   Medication Sig Dispense Refill   • amLODIPine (NORVASC) 10 MG tablet      • Cholecalciferol (VITAMIN D3) 2000 UNITS capsule Take 2,000 Units by mouth Daily.     • famotidine (PEPCID) 10 MG tablet Take 10 mg by mouth As Needed for Heartburn.     • ibuprofen (ADVIL,MOTRIN) 800 MG tablet      • spironolactone (ALDACTONE) 25 MG tablet Take 25 mg by mouth Daily.     • SYNTHROID 100 MCG tablet Take 100 mcg by mouth Daily.     • tiZANidine (ZANAFLEX) 4 MG tablet Zanaflex 4 mg tablet   Take 1 tablet as needed by oral route at bedtime.       No Known Allergies  Social History     Socioeconomic History   • Marital status:      Spouse name: Not on file   • Number of children: Not on file   • Years of education: Not on file   • Highest education level: Not on file   Tobacco Use   • Smoking status: Never Smoker   • Smokeless tobacco: Never  "Used   Substance and Sexual Activity   • Alcohol use: Yes     Comment: OCCASIONALLY   • Drug use: No   • Sexual activity: Yes     Partners: Male     Family History   Problem Relation Age of Onset   • Pancreatic cancer Father    • Breast cancer Mother 51   • Colon polyps Sister    • No Known Problems Brother    • Breast cancer Paternal Aunt    • Colon cancer Neg Hx      Health Maintenance   Topic Date Due   • TDAP/TD VACCINES (1 - Tdap) 04/20/1964   • ZOSTER VACCINE (1 of 2) 04/20/2003   • HEPATITIS C SCREENING  09/08/2016   • MEDICARE ANNUAL WELLNESS  09/08/2016   • Pneumococcal Vaccine Once at 65 Years Old  04/20/2018   • MAMMOGRAM  05/06/2021   • COLONOSCOPY  04/16/2025   • INFLUENZA VACCINE  Completed       REVIEW OF SYSTEMS  General: well appearing, no fever chills or sweats, no unexplained wt loss  HEENT: no acute visual or hearing disturbances  Cardiovascular: No chest pain or palpitations  Pulmonary: No shortness of breath, coughing, wheezing or hemoptysis  : No burning, urgency, hematuria, or dysuria  Musculoskeletal: No joint pain or stiffness  Peripheral: no edema  Skin: No lesions or rashes  Neuro: No dizziness, headaches, stroke, syncope  Endocrine: No hot or cold intolerances  Hematological: No blood dyscrasias    Objective   Vitals:    03/02/20 0835   BP: 132/78   Pulse: 68   SpO2: 97%   Weight: 70.3 kg (155 lb)   Height: 161.3 cm (63.5\")     Body mass index is 27.03 kg/m².  Patient's Body mass index is 27.03 kg/m². BMI is above normal parameters. Recommendations include: nutrition counseling.      PHYSICAL EXAM  General: age appropriate well nourished well appearing, no acute distress  Head: normocephalic and atraumatic  Global assessment-supple  Neck-No JVD noted, no lymphadenopathy  Pulmonary-clear to auscultation bilaterally, normal respiratory effort  Cardiovascular-normal rate and rhythm, normal heart sounds, S1 and S2 noted  Abdomen-soft, non tender, non distended, normal bowel sounds all 4 " quadrants, no hepatosplenomegaly noted  Extremities-No clubbing cyanosis or edema  Neuro-Non focal, converses appropriately, awake, alert, oriented    Assessment/Plan     Keshia was seen today for colonoscopy.    Diagnoses and all orders for this visit:    Hx of colonic polyps  -     Case Request; Standing  -     Follow Anesthesia Guidelines / Standing Orders; Future  -     Obtain Informed Consent; Future  -     Case Request  -     SUPREP BOWEL PREP KIT 17.5-3.13-1.6 GM/177ML solution oral solution; Take as directed by office instructions provided    HTN (hypertension), benign  Comments:  cont BP medication the day of procedure        COLONOSCOPY WITH ANESTHESIA (N/A)  Body mass index is 27.03 kg/m².    Patient instructions on prep prior to procedure provided to the patient.    All risks, benefits, alternatives, and indications of colonoscopy procedure have been discussed with the patient. Risks to include perforation of the colon requiring possible surgery or colostomy, risk of bleeding from biopsies or removal of colon tissue, possibility of missing a colon polyp or cancer, or adverse drug reaction.  Benefits to include the diagnosis and management of disease of the colon and rectum. Alternatives to include barium enema, radiographic evaluation, lab testing or no intervention. Pt verbalizes understanding and agrees.     Antonina Neri, APRN  3/2/2020  8:51 AM      IF YOU SMOKE OR USE TOBACCO PLEASE READ THE FOLLOWIN minutes reading provided    Why is smoking bad for me?  Smoking increases the risk of heart disease, lung disease, vascular disease, stroke, and cancer.     If you smoke, STOP!    If you would like more information on quitting smoking, please visit the [employer] website: [email or web address]   This link will provide additional resources including the QUIT line and the Beat the Pack support groups.     For more information:    Quit Now  [place]  1-800-QUIT-NOW  https://[place].quitlogix.org/en-[place]/    Obesity, Adult  Obesity is the condition of having too much total body fat. Being overweight or obese means that your weight is greater than what is considered healthy for your body size. Obesity is determined by a measurement called BMI. BMI is an estimate of body fat and is calculated from height and weight. For adults, a BMI of 30 or higher is considered obese.  Obesity can eventually lead to other health concerns and major illnesses, including:  · Stroke.  · Coronary artery disease (CAD).  · Type 2 diabetes.  · Some types of cancer, including cancers of the colon, breast, uterus, and gallbladder.  · Osteoarthritis.  · High blood pressure (hypertension).  · High cholesterol.  · Sleep apnea.  · Gallbladder stones.  · Infertility problems.  What are the causes?  The main cause of obesity is taking in (consuming) more calories than your body uses for energy. Other factors that contribute to this condition may include:  · Being born with genes that make you more likely to become obese.  · Having a medical condition that causes obesity. These conditions include:  ¨ Hypothyroidism.  ¨ Polycystic ovarian syndrome (PCOS).  ¨ Binge-eating disorder.  ¨ Cushing syndrome.  · Taking certain medicines, such as steroids, antidepressants, and seizure medicines.  · Not being physically active (sedentary lifestyle).  · Living where there are limited places to exercise safely or buy healthy foods.  · Not getting enough sleep.  What increases the risk?  The following factors may increase your risk of this condition:  · Having a family history of obesity.  · Being a woman of -American descent.  · Being a man of  descent.  What are the signs or symptoms?  Having excessive body fat is the main symptom of this condition.  How is this diagnosed?  This condition may be diagnosed based on:  · Your symptoms.  · Your medical history.  · A physical exam. Your  health care provider may measure:  ¨ Your BMI. If you are an adult with a BMI between 25 and less than 30, you are considered overweight. If you are an adult with a BMI of 30 or higher, you are considered obese.  ¨ The distances around your hips and your waist (circumferences). These may be compared to each other to help diagnose your condition.  ¨ Your skinfold thickness. Your health care provider may gently pinch a fold of your skin and measure it.  How is this treated?  Treatment for this condition often includes changing your lifestyle. Treatment may include some or all of the following:  · Dietary changes. Work with your health care provider and a dietitian to set a weight-loss goal that is healthy and reasonable for you. Dietary changes may include eating:  ¨ Smaller portions. A portion size is the amount of a particular food that is healthy for you to eat at one time. This varies from person to person.  ¨ Low-calorie or low-fat options.  ¨ More whole grains, fruits, and vegetables.  · Regular physical activity. This may include aerobic activity (cardio) and strength training.  · Medicine to help you lose weight. Your health care provider may prescribe medicine if you are unable to lose 1 pound a week after 6 weeks of eating more healthily and doing more physical activity.  · Surgery. Surgical options may include gastric banding and gastric bypass. Surgery may be done if:  ¨ Other treatments have not helped to improve your condition.  ¨ You have a BMI of 40 or higher.  ¨ You have life-threatening health problems related to obesity.  Follow these instructions at home:     Eating and drinking     · Follow recommendations from your health care provider about what you eat and drink. Your health care provider may advise you to:  ¨ Limit fast foods, sweets, and processed snack foods.  ¨ Choose low-fat options, such as low-fat milk instead of whole milk.  ¨ Eat 5 or more servings of fruits or vegetables every  day.  ¨ Eat at home more often. This gives you more control over what you eat.  ¨ Choose healthy foods when you eat out.  ¨ Learn what a healthy portion size is.  ¨ Keep low-fat snacks on hand.  ¨ Avoid sugary drinks, such as soda, fruit juice, iced tea sweetened with sugar, and flavored milk.  ¨ Eat a healthy breakfast.  · Drink enough water to keep your urine clear or pale yellow.  · Do not go without eating for long periods of time (do not fast) or follow a fad diet. Fasting and fad diets can be unhealthy and even dangerous.  Physical Activity   · Exercise regularly, as told by your health care provider. Ask your health care provider what types of exercise are safe for you and how often you should exercise.  · Warm up and stretch before being active.  · Cool down and stretch after being active.  · Rest between periods of activity.  Lifestyle   · Limit the time that you spend in front of your TV, computer, or video game system.  · Find ways to reward yourself that do not involve food.  · Limit alcohol intake to no more than 1 drink a day for nonpregnant women and 2 drinks a day for men. One drink equals 12 oz of beer, 5 oz of wine, or 1½ oz of hard liquor.  General instructions   · Keep a weight loss journal to keep track of the food you eat and how much you exercise you get.  · Take over-the-counter and prescription medicines only as told by your health care provider.  · Take vitamins and supplements only as told by your health care provider.  · Consider joining a support group. Your health care provider may be able to recommend a support group.  · Keep all follow-up visits as told by your health care provider. This is important.  Contact a health care provider if:  · You are unable to meet your weight loss goal after 6 weeks of dietary and lifestyle changes.  This information is not intended to replace advice given to you by your health care provider. Make sure you discuss any questions you have with your health  care provider.  Document Released: 01/25/2006 Document Revised: 05/22/2017 Document Reviewed: 10/05/2016  Elsevier Interactive Patient Education © 2017 [employer] Inc[employer]

## 2020-03-17 ENCOUNTER — LAB (OUTPATIENT)
Dept: INTERNAL MEDICINE | Facility: CLINIC | Age: 65
End: 2020-03-17

## 2020-03-17 DIAGNOSIS — I10 BENIGN HYPERTENSION: Primary | ICD-10-CM

## 2020-03-17 DIAGNOSIS — E78.00 HIGH CHOLESTEROL: ICD-10-CM

## 2020-03-17 DIAGNOSIS — E03.9 HYPOTHYROIDISM, UNSPECIFIED TYPE: ICD-10-CM

## 2020-03-17 DIAGNOSIS — Z79.899 ENCOUNTER FOR LONG-TERM (CURRENT) USE OF OTHER MEDICATIONS: ICD-10-CM

## 2020-03-18 LAB
ALBUMIN SERPL-MCNC: 4.6 G/DL (ref 3.5–5.2)
ALBUMIN/GLOB SERPL: 1.8 G/DL
ALP SERPL-CCNC: 128 U/L (ref 39–117)
ALT SERPL-CCNC: 29 U/L (ref 1–33)
APPEARANCE UR: CLEAR
AST SERPL-CCNC: 19 U/L (ref 1–32)
BACTERIA #/AREA URNS HPF: NORMAL /HPF
BASOPHILS # BLD AUTO: ABNORMAL 10*3/UL
BASOPHILS # BLD MANUAL: 0.11 10*3/MM3 (ref 0–0.2)
BASOPHILS NFR BLD MANUAL: 2 % (ref 0–1.5)
BILIRUB SERPL-MCNC: 0.9 MG/DL (ref 0.2–1.2)
BILIRUB UR QL STRIP: NEGATIVE
BUN SERPL-MCNC: 7 MG/DL (ref 8–23)
BUN/CREAT SERPL: 7.4 (ref 7–25)
CALCIUM SERPL-MCNC: 9.2 MG/DL (ref 8.6–10.5)
CASTS URNS MICRO: NORMAL
CHLORIDE SERPL-SCNC: 101 MMOL/L (ref 98–107)
CHOLEST SERPL-MCNC: 205 MG/DL (ref 0–200)
CO2 SERPL-SCNC: 23.7 MMOL/L (ref 22–29)
COLOR UR: YELLOW
CREAT SERPL-MCNC: 0.94 MG/DL (ref 0.57–1)
DIFFERENTIAL COMMENT: ABNORMAL
EOSINOPHIL # BLD AUTO: ABNORMAL 10*3/UL
EOSINOPHIL # BLD MANUAL: 0.16 10*3/MM3 (ref 0–0.4)
EOSINOPHIL NFR BLD AUTO: ABNORMAL %
EOSINOPHIL NFR BLD MANUAL: 3 % (ref 0.3–6.2)
EPI CELLS #/AREA URNS HPF: NORMAL /HPF
ERYTHROCYTE [DISTWIDTH] IN BLOOD BY AUTOMATED COUNT: 12.4 % (ref 12.3–15.4)
GLOBULIN SER CALC-MCNC: 2.5 GM/DL
GLUCOSE SERPL-MCNC: 88 MG/DL (ref 65–99)
GLUCOSE UR QL: NEGATIVE
HCT VFR BLD AUTO: 40.8 % (ref 34–46.6)
HDLC SERPL-MCNC: 46 MG/DL (ref 40–60)
HGB BLD-MCNC: 13.5 G/DL (ref 12–15.9)
HGB UR QL STRIP: NEGATIVE
KETONES UR QL STRIP: NEGATIVE
LDLC SERPL CALC-MCNC: 129 MG/DL (ref 0–100)
LEUKOCYTE ESTERASE UR QL STRIP: ABNORMAL
LYMPHOCYTES # BLD AUTO: ABNORMAL 10*3/UL
LYMPHOCYTES # BLD MANUAL: 2.1 10*3/MM3 (ref 0.7–3.1)
LYMPHOCYTES NFR BLD AUTO: ABNORMAL %
LYMPHOCYTES NFR BLD MANUAL: 40 % (ref 19.6–45.3)
MCH RBC QN AUTO: 29.7 PG (ref 26.6–33)
MCHC RBC AUTO-ENTMCNC: 33.1 G/DL (ref 31.5–35.7)
MCV RBC AUTO: 89.7 FL (ref 79–97)
MONOCYTES # BLD MANUAL: 0.32 10*3/MM3 (ref 0.1–0.9)
MONOCYTES NFR BLD AUTO: ABNORMAL %
MONOCYTES NFR BLD MANUAL: 6 % (ref 5–12)
NEUTROPHILS # BLD MANUAL: 2.58 10*3/MM3 (ref 1.7–7)
NEUTROPHILS NFR BLD AUTO: ABNORMAL %
NEUTROPHILS NFR BLD MANUAL: 49 % (ref 42.7–76)
NITRITE UR QL STRIP: NEGATIVE
PH UR STRIP: 6.5 [PH] (ref 5–8)
PLATELET # BLD AUTO: 135 10*3/MM3 (ref 140–450)
PLATELET BLD QL SMEAR: ABNORMAL
POTASSIUM SERPL-SCNC: 4.3 MMOL/L (ref 3.5–5.2)
PROT SERPL-MCNC: 7.1 G/DL (ref 6–8.5)
PROT UR QL STRIP: NEGATIVE
RBC # BLD AUTO: 4.55 10*6/MM3 (ref 3.77–5.28)
RBC #/AREA URNS HPF: NORMAL /HPF
RBC MORPH BLD: ABNORMAL
SODIUM SERPL-SCNC: 137 MMOL/L (ref 136–145)
SP GR UR: 1.01 (ref 1–1.03)
TRIGL SERPL-MCNC: 150 MG/DL (ref 0–150)
TSH SERPL DL<=0.005 MIU/L-ACNC: 0.12 UIU/ML (ref 0.27–4.2)
URATE SERPL-MCNC: 4.9 MG/DL (ref 2.4–5.7)
UROBILINOGEN UR STRIP-MCNC: ABNORMAL MG/DL
VLDLC SERPL CALC-MCNC: 30 MG/DL
WBC # BLD AUTO: 5.26 10*3/MM3 (ref 3.4–10.8)
WBC #/AREA URNS HPF: NORMAL /HPF

## 2020-03-19 ENCOUNTER — OFFICE VISIT (OUTPATIENT)
Dept: INTERNAL MEDICINE | Facility: CLINIC | Age: 65
End: 2020-03-19

## 2020-03-19 VITALS
HEART RATE: 69 BPM | WEIGHT: 157 LBS | OXYGEN SATURATION: 99 % | HEIGHT: 63 IN | BODY MASS INDEX: 27.82 KG/M2 | SYSTOLIC BLOOD PRESSURE: 124 MMHG | DIASTOLIC BLOOD PRESSURE: 72 MMHG

## 2020-03-19 DIAGNOSIS — R74.8 ALKALINE PHOSPHATASE ELEVATION: ICD-10-CM

## 2020-03-19 DIAGNOSIS — E03.9 ACQUIRED HYPOTHYROIDISM: ICD-10-CM

## 2020-03-19 DIAGNOSIS — I10 BENIGN HYPERTENSION: ICD-10-CM

## 2020-03-19 DIAGNOSIS — E04.9 GOITER, NON-TOXIC: Primary | ICD-10-CM

## 2020-03-19 PROBLEM — E78.00 ELEVATED CHOLESTEROL: Status: ACTIVE | Noted: 2020-03-19

## 2020-03-19 PROBLEM — M06.9 RHEUMATOID ARTHRITIS: Status: ACTIVE | Noted: 2020-03-19

## 2020-03-19 PROBLEM — I07.1 MILD TRICUSPID INSUFFICIENCY: Status: ACTIVE | Noted: 2020-03-19

## 2020-03-19 PROBLEM — D68.32: Status: ACTIVE | Noted: 2020-03-19

## 2020-03-19 PROBLEM — M54.2 NECK PAIN: Status: ACTIVE | Noted: 2020-03-19

## 2020-03-19 PROBLEM — K21.9 GASTROESOPHAGEAL REFLUX DISEASE WITHOUT ESOPHAGITIS: Status: ACTIVE | Noted: 2020-03-19

## 2020-03-19 PROBLEM — E55.9 VITAMIN D DEFICIENCY: Status: ACTIVE | Noted: 2020-03-19

## 2020-03-19 PROBLEM — T75.3XXA MOTION SICKNESS: Status: ACTIVE | Noted: 2020-03-19

## 2020-03-19 PROBLEM — M40.209 ACQUIRED KYPHOSIS: Status: ACTIVE | Noted: 2020-03-19

## 2020-03-19 PROBLEM — M26.69 TMJ CREPITUS: Status: ACTIVE | Noted: 2020-03-19

## 2020-03-19 PROBLEM — I51.9 MYXEDEMA HEART DISEASE: Status: ACTIVE | Noted: 2020-03-19

## 2020-03-19 PROBLEM — R31.29 MICROSCOPIC HEMATURIA: Status: ACTIVE | Noted: 2020-03-19

## 2020-03-19 PROBLEM — R01.1 SYSTOLIC MURMUR: Status: ACTIVE | Noted: 2020-03-19

## 2020-03-19 PROBLEM — E83.52 HYPERCALCEMIA: Status: ACTIVE | Noted: 2020-03-19

## 2020-03-19 PROBLEM — M85.80 OSTEOPENIA: Status: ACTIVE | Noted: 2020-03-19

## 2020-03-19 PROCEDURE — 99214 OFFICE O/P EST MOD 30 MIN: CPT | Performed by: INTERNAL MEDICINE

## 2020-03-19 RX ORDER — IBUPROFEN 800 MG/1
800 TABLET ORAL AS NEEDED
Qty: 90 TABLET | Refills: 3 | Status: SHIPPED | OUTPATIENT
Start: 2020-03-19 | End: 2021-05-18 | Stop reason: SDUPTHER

## 2020-03-19 RX ORDER — LEVOTHYROXINE SODIUM 88 UG/1
88 TABLET ORAL DAILY
Qty: 90 TABLET | Refills: 3 | Status: SHIPPED | OUTPATIENT
Start: 2020-03-19 | End: 2021-05-18

## 2020-03-19 RX ORDER — SPIRONOLACTONE 25 MG/1
25 TABLET ORAL DAILY
Qty: 90 TABLET | Refills: 3 | Status: SHIPPED | OUTPATIENT
Start: 2020-03-19 | End: 2020-06-19

## 2020-03-19 RX ORDER — LEVOTHYROXINE SODIUM 88 UG/1
88 TABLET ORAL DAILY
Qty: 90 TABLET | Refills: 3 | Status: SHIPPED | OUTPATIENT
Start: 2020-03-19 | End: 2020-03-19

## 2020-03-19 RX ORDER — LEVOTHYROXINE SODIUM 100 MCG
88 TABLET ORAL DAILY
Status: CANCELLED | OUTPATIENT
Start: 2020-03-19

## 2020-03-19 NOTE — PROGRESS NOTES
Subjective   Keshia Fletcher is a 64 y.o. female.   Chief Complaint   Patient presents with   • Annual Exam     No Pap or Breast Exam sees OB/GYN       Patient is here for annual checkup wellness visit as well as management of her hypertension and hypothyroidism and known goiter.       The following portions of the patient's history were reviewed and updated as appropriate: allergies, current medications, past family history, past medical history, past social history, past surgical history and problem list.    Review of Systems   Constitutional: Negative for activity change, appetite change, fatigue, fever, unexpected weight gain and unexpected weight loss.   HENT: Negative for swollen glands, trouble swallowing and voice change.    Eyes: Negative for blurred vision and visual disturbance.   Respiratory: Negative for cough and shortness of breath.    Cardiovascular: Negative for chest pain, palpitations and leg swelling.   Gastrointestinal: Negative for abdominal pain, constipation, diarrhea, nausea, vomiting and indigestion.   Endocrine: Negative for cold intolerance, heat intolerance, polydipsia and polyphagia.   Genitourinary: Negative for dysuria and frequency.   Musculoskeletal: Negative for arthralgias, back pain, joint swelling and neck pain.   Skin: Negative for color change, rash and skin lesions.   Neurological: Negative for dizziness, weakness, headache, memory problem and confusion.   Hematological: Does not bruise/bleed easily.   Psychiatric/Behavioral: Negative for agitation, hallucinations and suicidal ideas. The patient is not nervous/anxious.        Objective   Past Medical History:   Diagnosis Date   • Acid reflux    • Arthritis    • Heart murmur    • Hx of colonic polyp    • Hypertension    • Hypothyroidism       Past Surgical History:   Procedure Laterality Date   • APPENDECTOMY  2007   • BREAST BIOPSY Left 5/25/2017    Procedure: LEFT ULTRASOUND GUIDED NEEDLE DIRECT EXCISIONAL BIOPSY, MAMMOGRAM TO  FOLLOW, BREAST;  Surgeon: Dianne Honeycutt MD;  Location: [facility] OR;  Service:    • COLONOSCOPY W/ POLYPECTOMY  04/16/2015    2 Hyperplastic polyps at 30 cm repeat exam in 5 years   • DILATION AND CURETTAGE, DIAGNOSTIC / THERAPEUTIC  1992, 2016    X 2    • LASIK Right         Current Outpatient Medications:   •  amLODIPine (NORVASC) 10 MG tablet, Take 10 mg by mouth Daily., Disp: , Rfl:   •  Cholecalciferol (VITAMIN D3) 2000 UNITS capsule, Take 2,000 Units by mouth Daily., Disp: , Rfl:   •  famotidine (PEPCID) 10 MG tablet, Take 10 mg by mouth As Needed for Heartburn., Disp: , Rfl:   •  ibuprofen (ADVIL,MOTRIN) 800 MG tablet, Take 1 tablet by mouth As Needed for Mild Pain ., Disp: 90 tablet, Rfl: 3  •  spironolactone (ALDACTONE) 25 MG tablet, Take 1 tablet by mouth Daily., Disp: 90 tablet, Rfl: 3  •  tiZANidine (Zanaflex) 4 MG tablet, Take 4 mg by mouth As Needed., Disp: , Rfl:   •  levothyroxine (SYNTHROID, LEVOTHROID) 88 MCG tablet, Take 1 tablet by mouth Daily., Disp: 90 tablet, Rfl: 3     Vitals:    03/19/20 0907   BP: 124/72   Pulse: 69   SpO2: 99%         03/19/20  0907   Weight: 71.2 kg (157 lb)     Patient's Body mass index is 27.81 kg/m². BMI is above normal parameters. Recommendations include: exercise counseling and nutrition counseling.      Physical Exam   Constitutional: She is oriented to person, place, and time. She appears well-developed and well-nourished.   HENT:   Head: Normocephalic and atraumatic.   Right Ear: External ear normal.   Left Ear: External ear normal.   Nose: Nose normal.   Mouth/Throat: Oropharynx is clear and moist.   Mild enlarged diffuse goiter   Eyes: Pupils are equal, round, and reactive to light. Conjunctivae and EOM are normal.   Neck: Normal range of motion. Neck supple. No thyromegaly present.   Cardiovascular: Normal rate, regular rhythm, normal heart sounds and intact distal pulses.   Pulmonary/Chest: Effort normal and breath sounds normal.   Abdominal: Soft. Bowel  sounds are normal.   Lymphadenopathy:     She has no cervical adenopathy.   Neurological: She is alert and oriented to person, place, and time.   Skin: Skin is warm and dry.   Psychiatric: She has a normal mood and affect. Her behavior is normal. Thought content normal.   Nursing note and vitals reviewed.            Assessment/Plan   Diagnoses and all orders for this visit:    1. Goiter, non-toxic (Primary)    2. Acquired hypothyroidism    3. Alkaline phosphatase elevation    4. Benign hypertension    Other orders  -     ibuprofen (ADVIL,MOTRIN) 800 MG tablet; Take 1 tablet by mouth As Needed for Mild Pain .  Dispense: 90 tablet; Refill: 3  -     spironolactone (ALDACTONE) 25 MG tablet; Take 1 tablet by mouth Daily.  Dispense: 90 tablet; Refill: 3  -     Cancel: SYNTHROID 100 MCG tablet; Take 1 tablet by mouth Daily.  -     Discontinue: levothyroxine (SYNTHROID, LEVOTHROID) 88 MCG tablet; Take 1 tablet by mouth Daily.  Dispense: 90 tablet; Refill: 3  -     levothyroxine (SYNTHROID, LEVOTHROID) 88 MCG tablet; Take 1 tablet by mouth Daily.  Dispense: 90 tablet; Refill: 3      At this point patient's the thyroid appears to be overmedicated.  Her TSH is low she has a elevated alkaline phosphatase which is been an chronic problem for her I am going to adjust her medication down slightly from 100 mcg to 88 mcg daily.  No change in her Gorder her blood pressure is well controlled.

## 2020-04-03 ENCOUNTER — TELEPHONE (OUTPATIENT)
Dept: GASTROENTEROLOGY | Facility: CLINIC | Age: 65
End: 2020-04-03

## 2020-04-03 NOTE — TELEPHONE ENCOUNTER
Due to the Covid-19 epidemic the pt postponed her procedure with Dr. Ervin. We will keep their paperwork and call to reschedule once we are able to accommodate the patient. Patient is aware and agreeable with this.

## 2020-05-04 ENCOUNTER — TELEPHONE (OUTPATIENT)
Dept: GASTROENTEROLOGY | Facility: CLINIC | Age: 65
End: 2020-05-04

## 2020-05-04 NOTE — TELEPHONE ENCOUNTER
Patient was called to reschedule their recommended procedure which was cancelled due to Covid 19 crisis and governor mandates.  She has declined to reschedule at this time even after the importance of the procedure was explained. The patient was instructed to contact our office with any questions or concerns. They were offered an opportunity to schedule an appointment with the provider to discuss any concerns,  their medical condition and treatment recommendations and alternative options. They were also instructed to call the office to reschedule when they were willing to proceed.

## 2020-06-19 RX ORDER — SPIRONOLACTONE 25 MG/1
TABLET ORAL
Qty: 90 TABLET | Refills: 1 | Status: SHIPPED | OUTPATIENT
Start: 2020-06-19 | End: 2021-03-23

## 2020-08-21 RX ORDER — ALENDRONATE SODIUM 70 MG/1
TABLET ORAL
Qty: 12 TABLET | Refills: 3 | Status: SHIPPED | OUTPATIENT
Start: 2020-08-21 | End: 2021-05-17

## 2020-09-30 ENCOUNTER — OFFICE VISIT (OUTPATIENT)
Dept: INTERNAL MEDICINE | Facility: CLINIC | Age: 65
End: 2020-09-30

## 2020-09-30 VITALS
WEIGHT: 154.8 LBS | OXYGEN SATURATION: 98 % | BODY MASS INDEX: 27.43 KG/M2 | DIASTOLIC BLOOD PRESSURE: 90 MMHG | TEMPERATURE: 97.8 F | HEIGHT: 63 IN | HEART RATE: 60 BPM | SYSTOLIC BLOOD PRESSURE: 146 MMHG

## 2020-09-30 DIAGNOSIS — I10 BENIGN HYPERTENSION: ICD-10-CM

## 2020-09-30 DIAGNOSIS — Z23 NEED FOR INFLUENZA VACCINATION: Primary | ICD-10-CM

## 2020-09-30 DIAGNOSIS — E78.00 ELEVATED CHOLESTEROL: ICD-10-CM

## 2020-09-30 DIAGNOSIS — E03.9 ACQUIRED HYPOTHYROIDISM: ICD-10-CM

## 2020-09-30 PROCEDURE — 99214 OFFICE O/P EST MOD 30 MIN: CPT | Performed by: INTERNAL MEDICINE

## 2020-09-30 PROCEDURE — G0008 ADMIN INFLUENZA VIRUS VAC: HCPCS | Performed by: INTERNAL MEDICINE

## 2020-09-30 PROCEDURE — 90694 VACC AIIV4 NO PRSRV 0.5ML IM: CPT | Performed by: INTERNAL MEDICINE

## 2020-09-30 NOTE — PROGRESS NOTES
Subjective   Keshia Fletcher is a 65 y.o. female.   Chief Complaint   Patient presents with   • Hypertension     6 month follow up    • Hypothyroidism       This is a follow-up visit for patient with hypertension hyperlipidemia and hypothyroidism.  She is doing well she is not having any new problems she is taking her medications as prescribed.       The following portions of the patient's history were reviewed and updated as appropriate: allergies, current medications, past family history, past medical history, past social history, past surgical history and problem list.    Review of Systems   Constitutional: Negative for activity change, appetite change, fatigue, fever, unexpected weight gain and unexpected weight loss.   HENT: Negative for swollen glands, trouble swallowing and voice change.    Eyes: Negative for blurred vision and visual disturbance.   Respiratory: Negative for cough and shortness of breath.    Cardiovascular: Negative for chest pain, palpitations and leg swelling.   Gastrointestinal: Negative for abdominal pain, constipation, diarrhea, nausea, vomiting and indigestion.   Endocrine: Negative for cold intolerance, heat intolerance, polydipsia and polyphagia.   Genitourinary: Negative for dysuria and frequency.   Musculoskeletal: Negative for arthralgias, back pain, joint swelling and neck pain.   Skin: Negative for color change, rash and skin lesions.   Neurological: Negative for dizziness, weakness, headache, memory problem and confusion.   Hematological: Does not bruise/bleed easily.   Psychiatric/Behavioral: Negative for agitation, hallucinations and suicidal ideas. The patient is not nervous/anxious.        Objective   Past Medical History:   Diagnosis Date   • Acid reflux    • Arthritis    • Heart murmur    • Hx of colonic polyp    • Hypertension    • Hypothyroidism       Past Surgical History:   Procedure Laterality Date   • APPENDECTOMY  2007   • BREAST BIOPSY Left 5/25/2017    Procedure:  LEFT ULTRASOUND GUIDED NEEDLE DIRECT EXCISIONAL BIOPSY, MAMMOGRAM TO FOLLOW, BREAST;  Surgeon: Dianne Honeycutt MD;  Location: [facility] OR;  Service:    • COLONOSCOPY W/ POLYPECTOMY  04/16/2015    2 Hyperplastic polyps at 30 cm repeat exam in 5 years   • DILATION AND CURETTAGE, DIAGNOSTIC / THERAPEUTIC  1992, 2016    X 2    • LASIK Right         Current Outpatient Medications:   •  alendronate (FOSAMAX) 70 MG tablet, TAKE (1) TABLET ONCE A WEEK, Disp: 12 tablet, Rfl: 3  •  amLODIPine (NORVASC) 10 MG tablet, Take 10 mg by mouth Daily., Disp: , Rfl:   •  Cholecalciferol (VITAMIN D3) 2000 UNITS capsule, Take 2,000 Units by mouth Daily., Disp: , Rfl:   •  famotidine (PEPCID) 10 MG tablet, Take 10 mg by mouth As Needed for Heartburn., Disp: , Rfl:   •  ibuprofen (ADVIL,MOTRIN) 800 MG tablet, Take 1 tablet by mouth As Needed for Mild Pain ., Disp: 90 tablet, Rfl: 3  •  levothyroxine (SYNTHROID, LEVOTHROID) 88 MCG tablet, Take 1 tablet by mouth Daily., Disp: 90 tablet, Rfl: 3  •  spironolactone (ALDACTONE) 25 MG tablet, TAKE (1) TABLET BY MOUTH DAILY, Disp: 90 tablet, Rfl: 1  •  tiZANidine (Zanaflex) 4 MG tablet, Take 4 mg by mouth As Needed., Disp: , Rfl:      Vitals:    09/30/20 0951   BP: 146/90   Pulse: 60   Temp: 97.8 °F (36.6 °C)   SpO2: 98%         09/30/20  0951   Weight: 70.2 kg (154 lb 12.8 oz)     Patient's Body mass index is 27.42 kg/m². BMI is .      Physical Exam  Constitutional:       Appearance: Normal appearance. She is well-developed.   HENT:      Head: Normocephalic and atraumatic.      Right Ear: External ear normal.      Left Ear: External ear normal.   Eyes:      Extraocular Movements: Extraocular movements intact.      Conjunctiva/sclera: Conjunctivae normal.      Pupils: Pupils are equal, round, and reactive to light.   Neck:      Musculoskeletal: Normal range of motion and neck supple. No neck rigidity.      Vascular: No carotid bruit.   Cardiovascular:      Rate and Rhythm: Normal rate and regular  rhythm.      Pulses: Normal pulses.      Heart sounds: Murmur present. Systolic murmur present with a grade of 2/6. No gallop.    Pulmonary:      Effort: Pulmonary effort is normal.      Breath sounds: Normal breath sounds.   Abdominal:      General: Bowel sounds are normal.      Palpations: Abdomen is soft.   Musculoskeletal: Normal range of motion.         General: No swelling or tenderness.   Skin:     General: Skin is warm and dry.   Neurological:      General: No focal deficit present.      Mental Status: She is alert and oriented to person, place, and time.   Psychiatric:         Mood and Affect: Mood normal.         Behavior: Behavior normal.               Assessment/Plan   Diagnoses and all orders for this visit:    1. Benign hypertension (Primary)    2. Acquired hypothyroidism    3. Elevated cholesterol      2 d echo next year due to a systolic murmur which appears to be benign.  She has had echocardiograms in the past I have reviewed her old records and her old echocardiogram.

## 2020-10-01 LAB
BUN SERPL-MCNC: 16 MG/DL (ref 8–23)
BUN/CREAT SERPL: 16.8 (ref 7–25)
CALCIUM SERPL-MCNC: 9.3 MG/DL (ref 8.6–10.5)
CHLORIDE SERPL-SCNC: 103 MMOL/L (ref 98–107)
CO2 SERPL-SCNC: 23.2 MMOL/L (ref 22–29)
CREAT SERPL-MCNC: 0.95 MG/DL (ref 0.57–1)
GLUCOSE SERPL-MCNC: 88 MG/DL (ref 65–99)
POTASSIUM SERPL-SCNC: 4 MMOL/L (ref 3.5–5.2)
SODIUM SERPL-SCNC: 137 MMOL/L (ref 136–145)

## 2020-11-30 ENCOUNTER — TELEPHONE (OUTPATIENT)
Dept: INTERNAL MEDICINE | Facility: CLINIC | Age: 65
End: 2020-11-30

## 2020-11-30 ENCOUNTER — OFFICE VISIT (OUTPATIENT)
Dept: INTERNAL MEDICINE | Facility: CLINIC | Age: 65
End: 2020-11-30

## 2020-11-30 ENCOUNTER — LAB (OUTPATIENT)
Dept: LAB | Facility: HOSPITAL | Age: 65
End: 2020-11-30

## 2020-11-30 VITALS — BODY MASS INDEX: 27.29 KG/M2 | WEIGHT: 154 LBS | HEIGHT: 63 IN | TEMPERATURE: 99.5 F

## 2020-11-30 DIAGNOSIS — J40 BRONCHITIS: Primary | ICD-10-CM

## 2020-11-30 DIAGNOSIS — J40 BRONCHITIS: ICD-10-CM

## 2020-11-30 PROCEDURE — U0003 INFECTIOUS AGENT DETECTION BY NUCLEIC ACID (DNA OR RNA); SEVERE ACUTE RESPIRATORY SYNDROME CORONAVIRUS 2 (SARS-COV-2) (CORONAVIRUS DISEASE [COVID-19]), AMPLIFIED PROBE TECHNIQUE, MAKING USE OF HIGH THROUGHPUT TECHNOLOGIES AS DESCRIBED BY CMS-2020-01-R: HCPCS

## 2020-11-30 PROCEDURE — 99442 PR PHYS/QHP TELEPHONE EVALUATION 11-20 MIN: CPT | Performed by: INTERNAL MEDICINE

## 2020-11-30 PROCEDURE — C9803 HOPD COVID-19 SPEC COLLECT: HCPCS

## 2020-11-30 RX ORDER — ONDANSETRON 4 MG/1
4 TABLET, FILM COATED ORAL EVERY 8 HOURS PRN
Qty: 8 TABLET | Refills: 0 | Status: SHIPPED | OUTPATIENT
Start: 2020-11-30 | End: 2021-02-23

## 2020-11-30 NOTE — TELEPHONE ENCOUNTER
Caller: YOUNG    Relationship: Spouse    Best call back number: [phone]    What medication are you requesting: MEDICATION FOR TREATMENT    What are your current symptoms: VOMITING, COUGH, HEADACHE    How long have you been experiencing symptoms: 11/28    Have you had these symptoms before:    [x] Yes  [] No    Have you been treated for these symptoms before:   [x] Yes  [] No    If a prescription is needed, what is your preferred pharmacy and phone number: [facility] - [place], [place] - [address] [place] [address] - [phone] [facility] [phone]   [phone]     Additional notes: PATIENT HAS HER  BOTH ARE SICK

## 2020-11-30 NOTE — PROGRESS NOTES
Subjective   Keshia Fletcher is a 65 y.o. female.   Chief Complaint   Patient presents with   • Headache     HA, vomiting last night    • Dizziness     started yesterday        History of Present Illness Patient developed headache nausea feels as if she has motion sickness low-grade temperature 995 and a mild cough.  She was exposed to a son-in-law 8 to 9 days ago who has Covid 19.  Patient's primary complaint is the nauseousness that she is experiencing    The following portions of the patient's history were reviewed and updated as appropriate: allergies, current medications, past family history, past medical history, past social history, past surgical history and problem list.    Review of Systems   Constitutional: Positive for fatigue and fever. Negative for activity change, appetite change, unexpected weight gain and unexpected weight loss.   HENT: Negative for swollen glands, trouble swallowing and voice change.    Eyes: Negative for blurred vision and visual disturbance.   Respiratory: Positive for cough. Negative for shortness of breath.    Cardiovascular: Negative for chest pain, palpitations and leg swelling.   Gastrointestinal: Positive for nausea. Negative for abdominal pain, constipation, diarrhea, vomiting and indigestion.   Endocrine: Negative for cold intolerance, heat intolerance, polydipsia and polyphagia.   Genitourinary: Negative for dysuria and frequency.   Musculoskeletal: Negative for arthralgias, back pain, joint swelling and neck pain.   Skin: Negative for color change, rash and skin lesions.   Neurological: Negative for dizziness, weakness, headache, memory problem and confusion.   Hematological: Does not bruise/bleed easily.   Psychiatric/Behavioral: Negative for agitation, hallucinations and suicidal ideas. The patient is not nervous/anxious.        Objective   Past Medical History:   Diagnosis Date   • Acid reflux    • Arthritis    • Heart murmur    • Hx of colonic polyp    • Hypertension     • Hypothyroidism       Past Surgical History:   Procedure Laterality Date   • APPENDECTOMY  2007   • BREAST BIOPSY Left 5/25/2017    Procedure: LEFT ULTRASOUND GUIDED NEEDLE DIRECT EXCISIONAL BIOPSY, MAMMOGRAM TO FOLLOW, BREAST;  Surgeon: Dianne Honeycutt MD;  Location: [facility] OR;  Service:    • COLONOSCOPY W/ POLYPECTOMY  04/16/2015    2 Hyperplastic polyps at 30 cm repeat exam in 5 years   • DILATION AND CURETTAGE, DIAGNOSTIC / THERAPEUTIC  1992, 2016    X 2    • LASIK Right         Current Outpatient Medications:   •  alendronate (FOSAMAX) 70 MG tablet, TAKE (1) TABLET ONCE A WEEK, Disp: 12 tablet, Rfl: 3  •  amLODIPine (NORVASC) 10 MG tablet, Take 10 mg by mouth Daily., Disp: , Rfl:   •  Cholecalciferol (VITAMIN D3) 2000 UNITS capsule, Take 2,000 Units by mouth Daily., Disp: , Rfl:   •  famotidine (PEPCID) 10 MG tablet, Take 10 mg by mouth As Needed for Heartburn., Disp: , Rfl:   •  ibuprofen (ADVIL,MOTRIN) 800 MG tablet, Take 1 tablet by mouth As Needed for Mild Pain ., Disp: 90 tablet, Rfl: 3  •  levothyroxine (SYNTHROID, LEVOTHROID) 88 MCG tablet, Take 1 tablet by mouth Daily., Disp: 90 tablet, Rfl: 3  •  spironolactone (ALDACTONE) 25 MG tablet, TAKE (1) TABLET BY MOUTH DAILY, Disp: 90 tablet, Rfl: 1  •  ondansetron (Zofran) 4 MG tablet, Take 1 tablet by mouth Every 8 (Eight) Hours As Needed for Nausea or Vomiting., Disp: 8 tablet, Rfl: 0     Vitals:    11/30/20 0927   Temp: 99.5 °F (37.5 °C)         11/30/20 0927   Weight: 69.9 kg (154 lb)     Patient's Body mass index is 27.28 kg/m². BMI is .      Physical Exam    This visit has been rescheduled as a phone visit to comply with patient safety concerns in accordance with CDC recommendations. Total time of discussion was 12 minutes.          Assessment/Plan   Diagnoses and all orders for this visit:    1. Bronchitis (Primary)  -     COVID-19,[facility],NP/OP Swab in Transport Media or ESwab 72 HR TAT - Swab, Oropharynx; Future    Other orders  -      ondansetron (Zofran) 4 MG tablet; Take 1 tablet by mouth Every 8 (Eight) Hours As Needed for Nausea or Vomiting.  Dispense: 8 tablet; Refill: 0      At this point patient is being sent for Covid testing I am sending her in some Zofran to control her nausea she has been instructed on quarantining for 14 days from the exposure of her son or isolation for 10 days if she test positive from the onset of her symptoms which were 2 days ago.

## 2020-12-02 LAB — SARS-COV-2 RNA RESP QL NAA+PROBE: DETECTED

## 2021-01-04 RX ORDER — AMLODIPINE BESYLATE 10 MG/1
10 TABLET ORAL DAILY
Qty: 90 TABLET | Refills: 3 | Status: SHIPPED | OUTPATIENT
Start: 2021-01-04 | End: 2022-02-07

## 2021-01-04 NOTE — TELEPHONE ENCOUNTER
Caller: Keshia Fletcher    Relationship: Self    Best call back number: [phone]    Medication needed:   Requested Prescriptions     Pending Prescriptions Disp Refills   • amLODIPine (NORVASC) 10 MG tablet        Sig: Take 1 tablet by mouth Daily.       Does the patient have less than a 3 day supply:  [x] Yes  [] No    What is the patient's preferred pharmacy: [facility] - [facility] [address] [place] [address] [phone] [facility] [phone]

## 2021-01-05 RX ORDER — ONDANSETRON 4 MG/1
TABLET, FILM COATED ORAL
Qty: 8 TABLET | Refills: 0 | OUTPATIENT
Start: 2021-01-05

## 2021-01-08 ENCOUNTER — TELEPHONE (OUTPATIENT)
Dept: INTERNAL MEDICINE | Facility: CLINIC | Age: 66
End: 2021-01-08

## 2021-01-08 NOTE — TELEPHONE ENCOUNTER
PATIENT HA QUESTIONS ABOUT COVID VACCINE AND WOULD LIKE A CALL BACK. PLEASE ADVISE.  BEST CALL BACK [phone]

## 2021-02-23 ENCOUNTER — OFFICE VISIT (OUTPATIENT)
Dept: OBSTETRICS AND GYNECOLOGY | Facility: CLINIC | Age: 66
End: 2021-02-23

## 2021-02-23 VITALS
DIASTOLIC BLOOD PRESSURE: 70 MMHG | SYSTOLIC BLOOD PRESSURE: 126 MMHG | HEIGHT: 63 IN | BODY MASS INDEX: 27.46 KG/M2 | WEIGHT: 155 LBS

## 2021-02-23 DIAGNOSIS — Z78.0 MENOPAUSE: ICD-10-CM

## 2021-02-23 DIAGNOSIS — Z12.31 ENCOUNTER FOR SCREENING MAMMOGRAM FOR MALIGNANT NEOPLASM OF BREAST: ICD-10-CM

## 2021-02-23 DIAGNOSIS — N89.8 VAGINAL DRYNESS: ICD-10-CM

## 2021-02-23 DIAGNOSIS — N94.10 FEMALE DYSPAREUNIA: ICD-10-CM

## 2021-02-23 DIAGNOSIS — Z01.419 ENCOUNTER FOR GYNECOLOGICAL EXAMINATION WITHOUT ABNORMAL FINDING: Primary | ICD-10-CM

## 2021-02-23 PROCEDURE — G0101 CA SCREEN;PELVIC/BREAST EXAM: HCPCS | Performed by: OBSTETRICS & GYNECOLOGY

## 2021-02-23 RX ORDER — ESTRADIOL 0.1 MG/G
2 CREAM VAGINAL 2 TIMES WEEKLY
Qty: 42.5 G | Refills: 12 | Status: SHIPPED | OUTPATIENT
Start: 2021-02-25 | End: 2022-05-19

## 2021-02-23 NOTE — PROGRESS NOTES
"Subjective   Chief Complaint   Patient presents with   • Annual Exam     pt here today for annual exam. pt voices no concerns.      Keshia Fletcher is a 65 y.o. year old  menopausal female presenting to be seen for her annual exam.  The patient denies any vaginal bleeding in the past 12 months  Hot flashes and night sweats are not a significant problem - happening \"only very seldom\".    She is sexually active.  In the past year there has not been new sexual partners.  Patient reports dyspareunia secondary to dryness.  Patient reports regular self breast exams: yes  She exercises regularly: yes.  Walking 3 miles/day when weather allows  She has noticed changes in height: no.    No Additional Complaints Reported    The following portions of the patient's history were reviewed and updated as appropriate:problem list, current medications, allergies, past family history, past medical history, past social history and past surgical history.  Social History    Tobacco Use      Smoking status: Never Smoker      Smokeless tobacco: Never Used    Review of Systems   Constitutional: Negative for activity change and unexpected weight change.   HENT: Negative for congestion.    Respiratory: Negative for shortness of breath.    Cardiovascular: Negative for chest pain.   Gastrointestinal: Negative for abdominal pain, blood in stool, constipation and diarrhea.        Colonoscopy due last year.  Patient did not have one last year due to Covid, but plans to schedule one for this year   Endocrine: Negative for cold intolerance and heat intolerance.   Genitourinary: Positive for dyspareunia (some, she attributes to dryness) and enuresis (mild GEOVANNI with sneezing or with a strong urge). Negative for difficulty urinating, pelvic pain, vaginal bleeding, vaginal discharge and vaginal pain.   Musculoskeletal: Positive for back pain. Negative for arthralgias, neck pain and neck stiffness.   Skin: Negative for rash.   Neurological: Negative " "for dizziness and headaches.   Psychiatric/Behavioral: Negative for dysphoric mood and sleep disturbance. The patient is not nervous/anxious.          Objective   /70   Ht 160 cm (63\")   Wt 70.3 kg (155 lb)   BMI 27.46 kg/m²   Physical Exam  Vitals signs and nursing note reviewed. Exam conducted with a chaperone present.   Constitutional:       General: She is not in acute distress.     Appearance: She is well-developed.   HENT:      Head: Normocephalic and atraumatic.   Neck:      Musculoskeletal: Normal range of motion and neck supple.      Thyroid: No thyromegaly.   Cardiovascular:      Rate and Rhythm: Normal rate and regular rhythm.      Heart sounds: No murmur.   Pulmonary:      Effort: Pulmonary effort is normal.      Breath sounds: Normal breath sounds.   Chest:      Breasts:         Right: No inverted nipple or mass.         Left: No inverted nipple or mass.   Abdominal:      General: There is no distension.      Palpations: Abdomen is soft.      Tenderness: There is no abdominal tenderness.   Genitourinary:     General: Normal vulva.      Labia:         Right: No tenderness or lesion.         Left: No tenderness or lesion.       Vagina: Normal. No vaginal discharge or bleeding.      Cervix: No cervical motion tenderness or discharge.      Adnexa:         Right: No tenderness or fullness.          Left: No tenderness or fullness.        Rectum: No external hemorrhoid or internal hemorrhoid. Normal anal tone.      Comments: Labia normal, no lesions noted.  Urethral meatus unremarkable, no prolapse of mucosa.  Anus/perineum normal.  Musculoskeletal: Normal range of motion.   Skin:     General: Skin is warm and dry.   Neurological:      Mental Status: She is alert and oriented to person, place, and time.   Psychiatric:         Behavior: Behavior normal.         Judgment: Judgment normal.            Assessment & Plan    Diagnoses and all orders for this visit:    1. Encounter for gynecological " examination without abnormal finding (Primary): Exam unremarkable.  Patient overdue for colonoscopy and reports that she will stop in their office as she is headed back out to the parking garage, and schedule her appointment -the only reason she did not do so last year was because of the pandemic.  Patient does routine screening labs with her PCP.  Regular self breast exam reported; mammogram and DEXA both ordered.  Patient taking Fosamax for her osteopenia and this will be a 2-year follow-up from her last DEXA.    2. Vaginal dryness: Patient previously prescribed Premarin but it was too expensive.  She would like to try Estrace vaginal cream.  Digital application was explained to the patient  -     estradiol (ESTRACE VAGINAL) 0.1 MG/GM vaginal cream; Insert 2 g into the vagina 2 (Two) Times a Week.  Dispense: 42.5 g; Refill: 12    3. Female dyspareunia  -     estradiol (ESTRACE VAGINAL) 0.1 MG/GM vaginal cream; Insert 2 g into the vagina 2 (Two) Times a Week.  Dispense: 42.5 g; Refill: 12    4. BMI 27.0-27.9,adult: Normal BMI.  Patient exercising regularly.  She walks 3 miles 5 days a week.  Encouragement given    5. Menopause  -     Mammo Screening Bilateral With CAD; Future  -     DEXA Bone Density Axial; Future    6. Encounter for screening mammogram for malignant neoplasm of breast   -     Mammo Screening Bilateral With CAD; Future    7.  Non-smoker    This note was electronically signed.    Gillian Goff MD  2/23/2021  09:07 CST

## 2021-03-01 ENCOUNTER — PREP FOR SURGERY (OUTPATIENT)
Dept: GASTROENTEROLOGY | Facility: CLINIC | Age: 66
End: 2021-03-01

## 2021-03-01 DIAGNOSIS — Z86.010 HX OF COLONIC POLYPS: Primary | ICD-10-CM

## 2021-03-23 RX ORDER — SPIRONOLACTONE 25 MG/1
TABLET ORAL
Qty: 90 TABLET | Refills: 3 | Status: SHIPPED | OUTPATIENT
Start: 2021-03-23 | End: 2022-07-07 | Stop reason: SDUPTHER

## 2021-03-25 ENCOUNTER — TRANSCRIBE ORDERS (OUTPATIENT)
Dept: LAB | Facility: HOSPITAL | Age: 66
End: 2021-03-25

## 2021-03-25 DIAGNOSIS — Z01.818 PREOPERATIVE TESTING: Primary | ICD-10-CM

## 2021-03-29 ENCOUNTER — LAB (OUTPATIENT)
Dept: LAB | Facility: HOSPITAL | Age: 66
End: 2021-03-29

## 2021-03-29 LAB — SARS-COV-2 ORF1AB RESP QL NAA+PROBE: NOT DETECTED

## 2021-03-29 PROCEDURE — C9803 HOPD COVID-19 SPEC COLLECT: HCPCS | Performed by: INTERNAL MEDICINE

## 2021-03-29 PROCEDURE — U0004 COV-19 TEST NON-CDC HGH THRU: HCPCS | Performed by: INTERNAL MEDICINE

## 2021-03-31 ENCOUNTER — ANESTHESIA (OUTPATIENT)
Dept: GASTROENTEROLOGY | Facility: HOSPITAL | Age: 66
End: 2021-03-31

## 2021-03-31 ENCOUNTER — HOSPITAL ENCOUNTER (OUTPATIENT)
Facility: HOSPITAL | Age: 66
Setting detail: HOSPITAL OUTPATIENT SURGERY
Discharge: HOME | End: 2021-03-31
Attending: INTERNAL MEDICINE | Admitting: INTERNAL MEDICINE

## 2021-03-31 ENCOUNTER — ANESTHESIA EVENT (OUTPATIENT)
Dept: GASTROENTEROLOGY | Facility: HOSPITAL | Age: 66
End: 2021-03-31

## 2021-03-31 VITALS
RESPIRATION RATE: 17 BRPM | BODY MASS INDEX: 26.93 KG/M2 | HEART RATE: 55 BPM | WEIGHT: 152 LBS | TEMPERATURE: 97.2 F | HEIGHT: 63 IN | DIASTOLIC BLOOD PRESSURE: 82 MMHG | SYSTOLIC BLOOD PRESSURE: 140 MMHG | OXYGEN SATURATION: 100 %

## 2021-03-31 DIAGNOSIS — Z86.010 HX OF COLONIC POLYPS: ICD-10-CM

## 2021-03-31 PROCEDURE — 45385 COLONOSCOPY W/LESION REMOVAL: CPT | Performed by: INTERNAL MEDICINE

## 2021-03-31 PROCEDURE — 88305 TISSUE EXAM BY PATHOLOGIST: CPT | Performed by: INTERNAL MEDICINE

## 2021-03-31 PROCEDURE — 25010000002 PROPOFOL 10 MG/ML EMULSION: Performed by: NURSE ANESTHETIST, CERTIFIED REGISTERED

## 2021-03-31 RX ORDER — PROPOFOL 10 MG/ML
VIAL (ML) INTRAVENOUS AS NEEDED
Status: DISCONTINUED | OUTPATIENT
Start: 2021-03-31 | End: 2021-03-31 | Stop reason: SURG

## 2021-03-31 RX ORDER — SODIUM CHLORIDE 0.9 % (FLUSH) 0.9 %
10 SYRINGE (ML) INJECTION AS NEEDED
Status: DISCONTINUED | OUTPATIENT
Start: 2021-03-31 | End: 2021-03-31 | Stop reason: HOSPADM

## 2021-03-31 RX ORDER — LIDOCAINE HYDROCHLORIDE 20 MG/ML
INJECTION, SOLUTION EPIDURAL; INFILTRATION; INTRACAUDAL; PERINEURAL AS NEEDED
Status: DISCONTINUED | OUTPATIENT
Start: 2021-03-31 | End: 2021-03-31 | Stop reason: SURG

## 2021-03-31 RX ORDER — SODIUM CHLORIDE 9 MG/ML
500 INJECTION, SOLUTION INTRAVENOUS CONTINUOUS PRN
Status: DISCONTINUED | OUTPATIENT
Start: 2021-03-31 | End: 2021-03-31 | Stop reason: HOSPADM

## 2021-03-31 RX ADMIN — SODIUM CHLORIDE 500 ML: 9 INJECTION, SOLUTION INTRAVENOUS at 07:42

## 2021-03-31 RX ADMIN — PROPOFOL 70 MG: 10 INJECTION, EMULSION INTRAVENOUS at 09:49

## 2021-03-31 RX ADMIN — PROPOFOL 50 MG: 10 INJECTION, EMULSION INTRAVENOUS at 09:55

## 2021-03-31 RX ADMIN — LIDOCAINE HYDROCHLORIDE 100 MG: 20 INJECTION, SOLUTION EPIDURAL; INFILTRATION; INTRACAUDAL; PERINEURAL at 09:43

## 2021-03-31 RX ADMIN — PROPOFOL 50 MG: 10 INJECTION, EMULSION INTRAVENOUS at 09:45

## 2021-03-31 RX ADMIN — PROPOFOL 80 MG: 10 INJECTION, EMULSION INTRAVENOUS at 09:43

## 2021-03-31 NOTE — ANESTHESIA PREPROCEDURE EVALUATION
Anesthesia Evaluation     Patient summary reviewed   no history of anesthetic complications:  NPO Solid Status: > 8 hours  NPO Liquid Status: > 2 hours           Airway   Mallampati: I  TM distance: >3 FB  Neck ROM: full  No difficulty expected  Dental - normal exam     Pulmonary    (-) asthma, sleep apnea, not a smoker  Cardiovascular   Exercise tolerance: good (4-7 METS)    (+) hypertension, valvular problems/murmurs murmur, hyperlipidemia,   (-) past MI, CAD, dysrhythmias, cardiac stents      Neuro/Psych  (-) seizures, TIA, CVA  GI/Hepatic/Renal/Endo    (+)  GERD,    (-) liver disease, no renal disease, diabetes    Musculoskeletal     Abdominal    Substance History      OB/GYN          Other   arthritis,                      Anesthesia Plan    ASA 2     MAC     intravenous induction     Anesthetic plan, all risks, benefits, and alternatives have been provided, discussed and informed consent has been obtained with: patient.

## 2021-03-31 NOTE — ANESTHESIA POSTPROCEDURE EVALUATION
Patient: Keshia Fletcher    Procedure Summary     Date: 03/31/21 Room / Location: [facility] ENDOSCOPY 6 / [facility]    Anesthesia Start: 0936 Anesthesia Stop: 1005    Procedure: COLONOSCOPY WITH ANESTHESIA (N/A ) Diagnosis:       Hx of colonic polyps      (Hx of colonic polyps [Z86.010])    Surgeons: Tomas Ervin MD Provider: Glenn Singleton CRNA    Anesthesia Type: MAC ASA Status: 2          Anesthesia Type: MAC    Vitals  Vitals Value Taken Time   /75 03/31/21 1005   Temp     Pulse 55 03/31/21 1005   Resp 12 03/31/21 1005   SpO2 97 % 03/31/21 1005   Vitals shown include unvalidated device data.        Post Anesthesia Care and Evaluation    Patient location during evaluation: PHASE II  Patient participation: complete - patient participated  Level of consciousness: awake  Pain score: 0  Pain management: adequate  Airway patency: patent  Anesthetic complications: No anesthetic complications  PONV Status: none  Cardiovascular status: acceptable  Respiratory status: acceptable  Hydration status: acceptable

## 2021-04-06 LAB
CYTO UR: NORMAL
LAB AP CASE REPORT: NORMAL
PATH REPORT.FINAL DX SPEC: NORMAL
PATH REPORT.GROSS SPEC: NORMAL

## 2021-05-12 DIAGNOSIS — Z12.31 ENCOUNTER FOR SCREENING MAMMOGRAM FOR MALIGNANT NEOPLASM OF BREAST: ICD-10-CM

## 2021-05-12 DIAGNOSIS — Z78.0 MENOPAUSE: ICD-10-CM

## 2021-05-12 NOTE — PROGRESS NOTES
Patient first with osteopenia on her DEXA in 2019.  Osteopenia stable in 2021.  Please advise patient that she needs to continue supplementation with calcium and vitamin D, with weightbearing exercise several times a week.  Anamaria

## 2021-05-13 ENCOUNTER — TELEPHONE (OUTPATIENT)
Dept: INTERNAL MEDICINE | Facility: CLINIC | Age: 66
End: 2021-05-13

## 2021-05-13 DIAGNOSIS — E55.9 VITAMIN D DEFICIENCY: ICD-10-CM

## 2021-05-13 DIAGNOSIS — E78.00 ELEVATED CHOLESTEROL: ICD-10-CM

## 2021-05-13 DIAGNOSIS — I10 BENIGN HYPERTENSION: Primary | ICD-10-CM

## 2021-05-13 DIAGNOSIS — E03.9 ACQUIRED HYPOTHYROIDISM: ICD-10-CM

## 2021-05-13 DIAGNOSIS — Z11.59 NEED FOR HEPATITIS C SCREENING TEST: ICD-10-CM

## 2021-05-13 NOTE — TELEPHONE ENCOUNTER
Caller: Keshia Fletcher    Relationship to patient: Self    Best call back number: [phone]  b    Patient is needing: Pt would like to know if labs are needed prior to appt on 3/18/21 - if so she would like to have orders placed so that she can come get labs completed  prior to appt.      Please call to advise if labs are needed and if orders are placed.

## 2021-05-14 DIAGNOSIS — E03.9 ACQUIRED HYPOTHYROIDISM: ICD-10-CM

## 2021-05-14 DIAGNOSIS — E78.00 ELEVATED CHOLESTEROL: ICD-10-CM

## 2021-05-14 DIAGNOSIS — E55.9 VITAMIN D DEFICIENCY: ICD-10-CM

## 2021-05-14 DIAGNOSIS — Z11.59 NEED FOR HEPATITIS C SCREENING TEST: ICD-10-CM

## 2021-05-14 DIAGNOSIS — I10 BENIGN HYPERTENSION: ICD-10-CM

## 2021-05-17 RX ORDER — ALENDRONATE SODIUM 70 MG/1
TABLET ORAL
Qty: 12 TABLET | Refills: 3 | Status: SHIPPED | OUTPATIENT
Start: 2021-05-17 | End: 2021-08-10

## 2021-05-18 ENCOUNTER — OFFICE VISIT (OUTPATIENT)
Dept: INTERNAL MEDICINE | Facility: CLINIC | Age: 66
End: 2021-05-18

## 2021-05-18 VITALS
HEART RATE: 65 BPM | WEIGHT: 156.38 LBS | TEMPERATURE: 98.2 F | OXYGEN SATURATION: 97 % | HEIGHT: 63 IN | RESPIRATION RATE: 16 BRPM | SYSTOLIC BLOOD PRESSURE: 130 MMHG | DIASTOLIC BLOOD PRESSURE: 86 MMHG | BODY MASS INDEX: 27.71 KG/M2

## 2021-05-18 DIAGNOSIS — E03.9 ACQUIRED HYPOTHYROIDISM: ICD-10-CM

## 2021-05-18 DIAGNOSIS — E04.9 GOITER, NON-TOXIC: ICD-10-CM

## 2021-05-18 DIAGNOSIS — R01.1 SYSTOLIC MURMUR: Primary | ICD-10-CM

## 2021-05-18 PROCEDURE — G0438 PPPS, INITIAL VISIT: HCPCS | Performed by: INTERNAL MEDICINE

## 2021-05-18 PROCEDURE — 96160 PT-FOCUSED HLTH RISK ASSMT: CPT | Performed by: INTERNAL MEDICINE

## 2021-05-18 PROCEDURE — 1170F FXNL STATUS ASSESSED: CPT | Performed by: INTERNAL MEDICINE

## 2021-05-18 PROCEDURE — 1125F AMNT PAIN NOTED PAIN PRSNT: CPT | Performed by: INTERNAL MEDICINE

## 2021-05-18 PROCEDURE — 1159F MED LIST DOCD IN RCRD: CPT | Performed by: INTERNAL MEDICINE

## 2021-05-18 RX ORDER — IBUPROFEN 800 MG/1
800 TABLET ORAL AS NEEDED
Qty: 90 TABLET | Refills: 3 | Status: SHIPPED | OUTPATIENT
Start: 2021-05-18

## 2021-05-18 RX ORDER — LEVOTHYROXINE SODIUM 0.1 MG/1
100 TABLET ORAL DAILY
Qty: 90 TABLET | Refills: 3 | Status: SHIPPED | OUTPATIENT
Start: 2021-05-18 | End: 2022-05-13 | Stop reason: SDUPTHER

## 2021-05-18 NOTE — PROGRESS NOTES
The ABCs of the Annual Wellness Visit  Initial Medicare Wellness Visit    Chief Complaint   Patient presents with   • Medicare Wellness-Initial Visit   • Choking     pt states she feels like when she starts eating she gets choked; pt states food gets lodged in her throat;        Subjective   History of Present Illness:  Keshia Fletcher is a 66 y.o. female who presents for an Initial Medicare Wellness Visit.    HEALTH RISK ASSESSMENT    Recent Hospitalizations:  No hospitalization(s) within the last year.    Current Medical Providers:  Patient Care Team:  Jose Justice MD as PCP - General  Jose Justice MD as PCP - Family Medicine  Tomas Ervin MD as Consulting Physician (Gastroenterology)    Smoking Status:  Social History     Tobacco Use   Smoking Status Never Smoker   Smokeless Tobacco Never Used       Alcohol Consumption:  Social History     Substance and Sexual Activity   Alcohol Use Yes    Comment: OCCASIONALLY       Depression Screen:   PHQ-2/PHQ-9 Depression Screening 5/18/2021   Little interest or pleasure in doing things 0   Feeling down, depressed, or hopeless 0   Total Score 0       Fall Risk Screen:  STEADI Fall Risk Assessment was completed, and patient is at LOW risk for falls.Assessment completed on:5/18/2021    Health Habits and Functional and Cognitive Screening:  No flowsheet data found.      Does the patient have evidence of cognitive impairment? No    Asprin use counseling:Does not need ASA (and currently is not on it)    Age-appropriate Screening Schedule:  Refer to the list below for future screening recommendations based on patient's age, sex and/or medical conditions. Orders for these recommended tests are listed in the plan section. The patient has been provided with a written plan.    Health Maintenance   Topic Date Due   • ZOSTER VACCINE (1 of 2) Never done   • INFLUENZA VACCINE  08/01/2021   • PAP SMEAR  02/13/2022   • LIPID PANEL  05/14/2022   • MAMMOGRAM  05/11/2023   • DXA  SCAN  05/11/2023   • TDAP/TD VACCINES (2 - Td) 03/13/2027          The following portions of the patient's history were reviewed and updated as appropriate: allergies, current medications, past family history, past medical history, past social history, past surgical history and problem list.    Outpatient Medications Prior to Visit   Medication Sig Dispense Refill   • alendronate (FOSAMAX) 70 MG tablet TAKE (1) TABLET ONCE A WEEK 12 tablet 3   • amLODIPine (NORVASC) 10 MG tablet Take 1 tablet by mouth Daily. 90 tablet 3   • Cholecalciferol (VITAMIN D3) 2000 UNITS capsule Take 2,000 Units by mouth Daily.     • estradiol (ESTRACE VAGINAL) 0.1 MG/GM vaginal cream Insert 2 g into the vagina 2 (Two) Times a Week. 42.5 g 12   • famotidine (PEPCID) 10 MG tablet Take 10 mg by mouth As Needed for Heartburn.     • spironolactone (ALDACTONE) 25 MG tablet TAKE (1) TABLET BY MOUTH DAILY 90 tablet 3   • ibuprofen (ADVIL,MOTRIN) 800 MG tablet Take 1 tablet by mouth As Needed for Mild Pain . 90 tablet 3   • levothyroxine (SYNTHROID, LEVOTHROID) 88 MCG tablet Take 1 tablet by mouth Daily. 90 tablet 3     No facility-administered medications prior to visit.       Patient Active Problem List   Diagnosis   • Hx of colonic polyps   • Alkaline phosphatase elevation   • Acquired kyphosis   • Elevated cholesterol   • Gastroesophageal reflux disease without esophagitis   • Goiter, non-toxic   • Hypercalcemia   • Hyperheparinemia (CMS/HCC)   • Microscopic hematuria   • Mild tricuspid insufficiency   • Motion sickness   • Myxedema heart disease   • Neck pain   • Osteopenia   • Rheumatoid arthritis (CMS/HCC)   • Systolic murmur   • Benign hypertension   • Vitamin D deficiency   • TMJ crepitus   • Hypothyroidism       Advanced Care Planning:  ACP discussion was held with the patient during this visit. Patient does not have an advance directive, information provided.    Review of Systems   Constitutional: Negative for activity change, appetite  "change and chills.   HENT: Positive for trouble swallowing. Negative for congestion, ear pain and facial swelling.    Eyes: Negative for pain, discharge and itching.   Respiratory: Negative for apnea, cough and shortness of breath.    Cardiovascular: Negative for chest pain, palpitations and leg swelling.   Gastrointestinal: Negative for abdominal distention, abdominal pain and anal bleeding.   Endocrine: Negative for cold intolerance and heat intolerance.   Genitourinary: Negative for difficulty urinating, dysuria and flank pain.   Musculoskeletal: Positive for arthralgias. Negative for back pain and joint swelling.   Skin: Negative for color change, pallor and rash.   Allergic/Immunologic: Negative for environmental allergies and food allergies.   Neurological: Negative for dizziness and facial asymmetry.   Hematological: Negative for adenopathy. Does not bruise/bleed easily.   Psychiatric/Behavioral: Negative for agitation, behavioral problems and confusion.       Compared to one year ago, the patient feels her physical health is the same.  Compared to one year ago, the patient feels her mental health is the same.    Reviewed chart for potential of high risk medication in the elderly: yes  Reviewed chart for potential of harmful drug interactions in the elderly:yes    Objective         Vitals:    05/18/21 0922   BP: 130/86   BP Location: Left arm   Patient Position: Sitting   Cuff Size: Adult   Pulse: 65   Resp: 16   Temp: 98.2 °F (36.8 °C)   TempSrc: Temporal   SpO2: 97%   Weight: 70.9 kg (156 lb 6 oz)   Height: 160 cm (63\")   PainSc: 0-No pain       Body mass index is 27.7 kg/m².  Discussed the patient's BMI with her. The BMI is above average; BMI management plan is completed.    Physical Exam  Constitutional:       Appearance: Normal appearance. She is well-developed.   HENT:      Head: Normocephalic and atraumatic.      Right Ear: External ear normal.      Left Ear: External ear normal.   Eyes:      " Extraocular Movements: Extraocular movements intact.      Conjunctiva/sclera: Conjunctivae normal.      Pupils: Pupils are equal, round, and reactive to light.   Neck:      Vascular: No carotid bruit.      Comments: Multinodular goiter small  Cardiovascular:      Rate and Rhythm: Normal rate and regular rhythm.      Pulses: Normal pulses.      Heart sounds: Murmur heard.   Systolic murmur is present with a grade of 2/6.   No gallop.    Pulmonary:      Effort: Pulmonary effort is normal.      Breath sounds: Normal breath sounds.   Musculoskeletal:         General: No swelling or tenderness. Normal range of motion.      Cervical back: Normal range of motion and neck supple. No rigidity.   Skin:     General: Skin is warm and dry.   Neurological:      General: No focal deficit present.      Mental Status: She is alert and oriented to person, place, and time.   Psychiatric:         Mood and Affect: Mood normal.         Behavior: Behavior normal.         Lab Results   Component Value Date    GLU 93 05/14/2021    CHLPL 243 (H) 05/14/2021    TRIG 147 05/14/2021    HDL 61 (H) 05/14/2021     (H) 05/14/2021    VLDL 26 05/14/2021        Assessment/Plan   Medicare Risks and Personalized Health Plan  CMS Preventative Services Quick Reference  Advance Directive Discussion  Breast Cancer/Mammogram Screening  Colon Cancer Screening  Immunizations Discussed/Encouraged (specific immunizations; Shingrix )  Osteoporosis Risk    The above risks/problems have been discussed with the patient.  Pertinent information has been shared with the patient in the After Visit Summary.  Follow up plans and orders are seen below in the Assessment/Plan Section.    Diagnoses and all orders for this visit:    1. Systolic murmur (Primary)    2. Acquired hypothyroidism  -     TSH; Future  -     T4, Free; Future  -     T3, Free; Future    3. Goiter, non-toxic    Other orders  -     levothyroxine (SYNTHROID, LEVOTHROID) 100 MCG tablet; Take 1 tablet  by mouth Daily.  Dispense: 90 tablet; Refill: 3  -     ibuprofen (ADVIL,MOTRIN) 800 MG tablet; Take 1 tablet by mouth As Needed for Mild Pain .  Dispense: 90 tablet; Refill: 3      Follow Up:  Return in about 6 months (around 11/18/2021).     An After Visit Summary and PPPS were given to the patient.       This point patient has a systolic murmur heard well in the aortic region but a grade 2/6 I went back and reviewed her echocardiogram from last year there was only mild aortic insufficiency seen nothing to account for the systolic murmur.  In regard to her thyroid her TSH is elevated I want to bump her L-thyroxine up to 100 mcg she will have a repeat T3-T4 TSH in 6 weeks.  She is also getting a refill on her ibuprofen today.  If this sticking sensation in her throat does not improve we may need to ultrasound her thyroid again will wait and let her get back to us in 6 weeks.

## 2021-07-08 ENCOUNTER — TELEPHONE (OUTPATIENT)
Dept: INTERNAL MEDICINE | Facility: CLINIC | Age: 66
End: 2021-07-08

## 2021-07-08 NOTE — TELEPHONE ENCOUNTER
Spoke with pt and gave results.  Pt states she is not feeling nervous or jittery and is fine with staying on current dose and discussing at next visit.  Pt will call if she starts having any issues

## 2021-07-08 NOTE — TELEPHONE ENCOUNTER
----- Message from Jose Justice MD sent at 7/5/2021  4:16 PM CDT -----  She is getting a little too much thyroid however she can just wait and we can discuss at OV but if feeling nervous or jittery we can drop [id number]

## 2021-07-29 ENCOUNTER — TELEPHONE (OUTPATIENT)
Dept: INTERNAL MEDICINE | Facility: CLINIC | Age: 66
End: 2021-07-29

## 2021-07-29 DIAGNOSIS — R13.19 ESOPHAGEAL DYSPHAGIA: Primary | ICD-10-CM

## 2021-07-29 NOTE — TELEPHONE ENCOUNTER
At May visit, she c/o feeling like food is getting stuck in her throat.  You increased her Synthroid dose and told her to call back if not better.  She has called today stating she feels like she is choking when swallowing about 3-4 times per week.   You had said at last visit that if this did not improve, would repeat u/s of thyroid.  Please advise.

## 2021-07-29 NOTE — TELEPHONE ENCOUNTER
Caller: Keshia Fletcher    Relationship: Self    Best call back number: [phone]    What is the best time to reach you:     Who are you requesting to speak with (clinical staff, provider,  specific staff member): NURSE  Do you know the name of the person who called: PATIENT    What was the call regarding: SWALLOWING IS STILL DIFFICULT AT LEAST 3 OR 4 TIMES PER WEEK, PATIENT FEELS LIKE SHE IS CHOKING    Do you require a callback: YES

## 2021-07-30 DIAGNOSIS — R13.10 DYSPHAGIA, UNSPECIFIED TYPE: Primary | ICD-10-CM

## 2021-08-10 RX ORDER — ALENDRONATE SODIUM 70 MG/1
TABLET ORAL
Qty: 12 TABLET | Refills: 3 | Status: SHIPPED | OUTPATIENT
Start: 2021-08-10 | End: 2021-11-17

## 2021-08-17 ENCOUNTER — HOSPITAL ENCOUNTER (OUTPATIENT)
Dept: ULTRASOUND IMAGING | Facility: HOSPITAL | Age: 66
Discharge: HOME | End: 2021-08-17
Admitting: INTERNAL MEDICINE

## 2021-08-17 ENCOUNTER — OFFICE VISIT (OUTPATIENT)
Dept: GASTROENTEROLOGY | Facility: CLINIC | Age: 66
End: 2021-08-17

## 2021-08-17 VITALS
TEMPERATURE: 98.2 F | OXYGEN SATURATION: 99 % | HEART RATE: 66 BPM | BODY MASS INDEX: 25.62 KG/M2 | HEIGHT: 63 IN | WEIGHT: 144.6 LBS | SYSTOLIC BLOOD PRESSURE: 144 MMHG | DIASTOLIC BLOOD PRESSURE: 84 MMHG

## 2021-08-17 DIAGNOSIS — I10 HTN (HYPERTENSION), BENIGN: ICD-10-CM

## 2021-08-17 DIAGNOSIS — R13.19 ESOPHAGEAL DYSPHAGIA: ICD-10-CM

## 2021-08-17 DIAGNOSIS — R13.19 ESOPHAGEAL DYSPHAGIA: Primary | ICD-10-CM

## 2021-08-17 PROCEDURE — 99214 OFFICE O/P EST MOD 30 MIN: CPT | Performed by: CLINICAL NURSE SPECIALIST

## 2021-08-17 PROCEDURE — 76536 US EXAM OF HEAD AND NECK: CPT

## 2021-08-17 NOTE — H&P (VIEW-ONLY)
Keshia Fletcher  1955 8/17/2021  Chief Complaint   Patient presents with   • GI Problem     dysphagia with meats and breads     Subjective   HPI  Keshia Fletcher is a 66 y.o. female who presents with a complaint of dysphagia progressive ongoing this year. She says that it is located in the upper esophageal region worse with meat such as hamburger and she is now not able to drink liquids when it is stuck it seems to not go down. So time makes it better. She says it has progressively worsened.   She has never had dilatation in the past. No nausea or vomiting. No wt loss. She is up to date with her colonoscopy.   GERD is stable with Pepcid as needed. She says she is not having indigestion or burning as much.   Past Medical History:   Diagnosis Date   • Acid reflux    • Arthritis    • Heart murmur    • Hx of colonic polyp    • Hypertension    • Hypothyroidism      Past Surgical History:   Procedure Laterality Date   • APPENDECTOMY  2007   • BREAST BIOPSY Left 5/25/2017    Procedure: LEFT ULTRASOUND GUIDED NEEDLE DIRECT EXCISIONAL BIOPSY, MAMMOGRAM TO FOLLOW, BREAST;  Surgeon: Dianne Honeycutt MD;  Location: [facility] OR;  Service:    • COLONOSCOPY N/A 3/31/2021    Sessille serrated adenoma at 40 cm, tubular adenoma at 30 cm repeat exam in 3 years   • COLONOSCOPY W/ POLYPECTOMY  04/16/2015    2 Hyperplastic polyps at 30 cm repeat exam in 5 years   • DILATION AND CURETTAGE, DIAGNOSTIC / THERAPEUTIC  1992, 2016    X 2    • LASIK Right        Outpatient Medications Marked as Taking for the 8/17/21 encounter (Office Visit) with Antonina Neri APRN   Medication Sig Dispense Refill   • alendronate (FOSAMAX) 70 MG tablet TAKE (1) TABLET ONCE A WEEK 12 tablet 3   • amLODIPine (NORVASC) 10 MG tablet Take 1 tablet by mouth Daily. 90 tablet 3   • Cholecalciferol (VITAMIN D3) 2000 UNITS capsule Take 2,000 Units by mouth Daily.     • estradiol (ESTRACE VAGINAL) 0.1 MG/GM vaginal cream Insert 2 g into the vagina 2 (Two) Times a  Week. 42.5 g 12   • famotidine (PEPCID) 10 MG tablet Take 10 mg by mouth As Needed for Heartburn.     • ibuprofen (ADVIL,MOTRIN) 800 MG tablet Take 1 tablet by mouth As Needed for Mild Pain . 90 tablet 3   • levothyroxine (SYNTHROID, LEVOTHROID) 100 MCG tablet Take 1 tablet by mouth Daily. 90 tablet 3   • spironolactone (ALDACTONE) 25 MG tablet TAKE (1) TABLET BY MOUTH DAILY 90 tablet 3     No Known Allergies  Social History     Socioeconomic History   • Marital status:      Spouse name: Not on file   • Number of children: Not on file   • Years of education: Not on file   • Highest education level: Not on file   Tobacco Use   • Smoking status: Never Smoker   • Smokeless tobacco: Never Used   Substance and Sexual Activity   • Alcohol use: Yes     Comment: OCCASIONALLY   • Drug use: No   • Sexual activity: Yes     Partners: Male     Family History   Problem Relation Age of Onset   • Pancreatic cancer Father    • Breast cancer Mother 51   • Colon polyps Sister    • No Known Problems Brother    • Breast cancer Paternal Aunt    • Colon cancer Neg Hx      Health Maintenance   Topic Date Due   • ZOSTER VACCINE (1 of 2) Never done   • Pneumococcal Vaccine 65+ (2 of 2 - PPSV23) 09/10/2020   • INFLUENZA VACCINE  10/01/2021   • PAP SMEAR  02/13/2022   • LIPID PANEL  05/14/2022   • ANNUAL WELLNESS VISIT  05/18/2022   • MAMMOGRAM  05/11/2023   • DXA SCAN  05/11/2023   • COLORECTAL CANCER SCREENING  03/31/2024   • TDAP/TD VACCINES (2 - Td or Tdap) 03/13/2027   • HEPATITIS C SCREENING  Completed   • COVID-19 Vaccine  Completed     Review of Systems   Constitutional: Negative for activity change, appetite change, chills, diaphoresis, fatigue, fever and unexpected weight change.   HENT: Positive for trouble swallowing. Negative for ear pain, hearing loss, mouth sores, sore throat and voice change.    Eyes: Negative.    Respiratory: Negative for cough, choking, shortness of breath and wheezing.    Cardiovascular: Negative for  "chest pain and palpitations.   Gastrointestinal: Negative for abdominal pain, blood in stool, constipation, diarrhea, nausea and vomiting.   Endocrine: Negative for cold intolerance and heat intolerance.   Genitourinary: Negative for decreased urine volume, dysuria, frequency, hematuria and urgency.   Musculoskeletal: Negative for back pain, gait problem and myalgias.   Skin: Negative for color change, pallor and rash.   Allergic/Immunologic: Negative for food allergies and immunocompromised state.   Neurological: Negative for dizziness, tremors, seizures, syncope, weakness, light-headedness, numbness and headaches.   Hematological: Negative for adenopathy. Does not bruise/bleed easily.   Psychiatric/Behavioral: Negative for agitation and confusion. The patient is not nervous/anxious.    All other systems reviewed and are negative.    Objective   Vitals:    08/17/21 0944   BP: 144/84   Pulse: 66   Temp: 98.2 °F (36.8 °C)   SpO2: 99%   Weight: 65.6 kg (144 lb 9.6 oz)   Height: 160 cm (63\")     Body mass index is 25.61 kg/m².  Physical Exam  Constitutional:       Appearance: She is well-developed.   HENT:      Head: Normocephalic and atraumatic.   Eyes:      Pupils: Pupils are equal, round, and reactive to light.   Neck:      Trachea: No tracheal deviation.   Cardiovascular:      Rate and Rhythm: Normal rate and regular rhythm.      Heart sounds: Normal heart sounds. No murmur heard.   No friction rub. No gallop.    Pulmonary:      Effort: Pulmonary effort is normal. No respiratory distress.      Breath sounds: Normal breath sounds. No wheezing or rales.   Chest:      Chest wall: No tenderness.   Abdominal:      General: Bowel sounds are normal. There is no distension.      Palpations: Abdomen is soft. Abdomen is not rigid.      Tenderness: There is no abdominal tenderness. There is no guarding or rebound.   Musculoskeletal:         General: No tenderness or deformity. Normal range of motion.      Cervical back: " Normal range of motion and neck supple.   Skin:     General: Skin is warm and dry.      Coloration: Skin is not pale.      Findings: No rash.   Neurological:      Mental Status: She is alert and oriented to person, place, and time.      Deep Tendon Reflexes: Reflexes are normal and symmetric.   Psychiatric:         Behavior: Behavior normal.         Thought Content: Thought content normal.         Judgment: Judgment normal.       Assessment/Plan   Diagnoses and all orders for this visit:    1. Esophageal dysphagia (Primary)  -     Case Request; Standing  -     Follow Anesthesia Guidelines / Standing Orders; Future  -     Obtain Informed Consent; Future  -     Implement Anesthesia Orders Day of Procedure; Standing  -     Obtain Informed Consent; Standing    2. HTN (hypertension), benign  Comments:  cont BP medication the day of procedure    I discussed non pharmaceutical treatment of gerd.  This includes gradually losing weight to achieve ideal body wt., elevation of the head of bed by 4-6 inches, nothing to eat or drink 3 hours prior to lying down, avoiding tight clothing, stress reduction, tobacco cessation, reduction of alcohol intake, and dietary restrictions (avoiding caffeine, coffee, fatty foods, mints, chocolate, spicy foods and tomato based sauces as much as possible).    Pepcid suggested or GAviscon if needed    ESOPHAGOGASTRODUODENOSCOPY WITH ANESTHESIA (N/A)  Part of this note may be an electronic transcription/translation of spoken language to printed text using the Dragon Dictation System.  Body mass index is 25.61 kg/m².  No follow-ups on file.    Patient's Body mass index is 25.61 kg/m². indicating that she is overweight (BMI 25-29.9). Obesity-related health conditions include the following: hypertension. Obesity is unchanged. BMI is is above average; BMI management plan is completed. We discussed portion control and increasing exercise..      All risks, benefits, alternatives, and indications of  colonoscopy and/or Endoscopy procedure have been discussed with the patient. Risks to include perforation of the colon requiring possible surgery or colostomy, risk of bleeding from biopsies or removal of colon tissue, possibility of missing a colon polyp or cancer, or adverse drug reaction.  Benefits to include the diagnosis and management of disease of the colon and rectum. Alternatives to include barium enema, radiographic evaluation, lab testing or no intervention. Pt verbalizes understanding and agrees.     Antonina Neri, APRN  8/17/2021  10:09 CDT          If you smoke or use tobacco, 4 minutes reading provided  Steps to Quit Smoking  Smoking tobacco can be harmful to your health and can affect almost every organ in your body. Smoking puts you, and those around you, at risk for developing many serious chronic diseases. Quitting smoking is difficult, but it is one of the best things that you can do for your health. It is never too late to quit.  What are the benefits of quitting smoking?  When you quit smoking, you lower your risk of developing serious diseases and conditions, such as:  · Lung cancer or lung disease, such as COPD.  · Heart disease.  · Stroke.  · Heart attack.  · Infertility.  · Osteoporosis and bone fractures.  Additionally, symptoms such as coughing, wheezing, and shortness of breath may get better when you quit. You may also find that you get sick less often because your body is stronger at fighting off colds and infections. If you are pregnant, quitting smoking can help to reduce your chances of having a baby of low birth weight.  How do I get ready to quit?  When you decide to quit smoking, create a plan to make sure that you are successful. Before you quit:  · Pick a date to quit. Set a date within the next two weeks to give you time to prepare.  · Write down the reasons why you are quitting. Keep this list in places where you will see it often, such as on your bathroom mirror or in  your car or wallet.  · Identify the people, places, things, and activities that make you want to smoke (triggers) and avoid them. Make sure to take these actions:  ¨ Throw away all cigarettes at home, at work, and in your car.  ¨ Throw away smoking accessories, such as ashtrays and lighters.  ¨ Clean your car and make sure to empty the ashtray.  ¨ Clean your home, including curtains and carpets.  · Tell your family, friends, and coworkers that you are quitting. Support from your loved ones can make quitting easier.  · Talk with your health care provider about your options for quitting smoking.  · Find out what treatment options are covered by your health insurance.  What strategies can I use to quit smoking?  Talk with your healthcare provider about different strategies to quit smoking. Some strategies include:  · Quitting smoking altogether instead of gradually lessening how much you smoke over a period of time. Research shows that quitting “cold turkey” is more successful than gradually quitting.  · Attending in-person counseling to help you build problem-solving skills. You are more likely to have success in quitting if you attend several counseling sessions. Even short sessions of 10 minutes can be effective.  · Finding resources and support systems that can help you to quit smoking and remain smoke-free after you quit. These resources are most helpful when you use them often. They can include:  ¨ Online chats with a counselor.  ¨ Telephone quitlines.  ¨ Printed self-help materials.  ¨ Support groups or group counseling.  ¨ Text messaging programs.  ¨ Mobile phone applications.  · Taking medicines to help you quit smoking. (If you are pregnant or breastfeeding, talk with your health care provider first.) Some medicines contain nicotine and some do not. Both types of medicines help with cravings, but the medicines that include nicotine help to relieve withdrawal symptoms. Your health care provider may  recommend:  ¨ Nicotine patches, gum, or lozenges.  ¨ Nicotine inhalers or sprays.  ¨ Non-nicotine medicine that is taken by mouth.  Talk with your health care provider about combining strategies, such as taking medicines while you are also receiving in-person counseling. Using these two strategies together makes you more likely to succeed in quitting than if you used either strategy on its own.  If you are pregnant or breastfeeding, talk with your health care provider about finding counseling or other support strategies to quit smoking. Do not take medicine to help you quit smoking unless told to do so by your health care provider.  What things can I do to make it easier to quit?  Quitting smoking might feel overwhelming at first, but there is a lot that you can do to make it easier. Take these important actions:  · Reach out to your family and friends and ask that they support and encourage you during this time. Call telephone quitlines, reach out to support groups, or work with a counselor for support.  · Ask people who smoke to avoid smoking around you.  · Avoid places that trigger you to smoke, such as bars, parties, or smoke-break areas at work.  · Spend time around people who do not smoke.  · Lessen stress in your life, because stress can be a smoking trigger for some people. To lessen stress, try:  ¨ Exercising regularly.  ¨ Deep-breathing exercises.  ¨ Yoga.  ¨ Meditating.  ¨ Performing a body scan. This involves closing your eyes, scanning your body from head to toe, and noticing which parts of your body are particularly tense. Purposefully relax the muscles in those areas.  · Download or purchase mobile phone or tablet apps (applications) that can help you stick to your quit plan by providing reminders, tips, and encouragement. There are many free apps, such as QuitGuide from the CDC (Centers for Disease Control and Prevention). You can find other support for quitting smoking (smoking cessation) through  smokefree.gov and other websites.  How will I feel when I quit smoking?  Within the first 24 hours of quitting smoking, you may start to feel some withdrawal symptoms. These symptoms are usually most noticeable 2-3 days after quitting, but they usually do not last beyond 2-3 weeks. Changes or symptoms that you might experience include:  · Mood swings.  · Restlessness, anxiety, or irritation.  · Difficulty concentrating.  · Dizziness.  · Strong cravings for sugary foods in addition to nicotine.  · Mild weight gain.  · Constipation.  · Nausea.  · Coughing or a sore throat.  · Changes in how your medicines work in your body.  · A depressed mood.  · Difficulty sleeping (insomnia).  After the first 2-3 weeks of quitting, you may start to notice more positive results, such as:  · Improved sense of smell and taste.  · Decreased coughing and sore throat.  · Slower heart rate.  · Lower blood pressure.  · Clearer skin.  · The ability to breathe more easily.  · Fewer sick days.  Quitting smoking is very challenging for most people. Do not get discouraged if you are not successful the first time. Some people need to make many attempts to quit before they achieve long-term success. Do your best to stick to your quit plan, and talk with your health care provider if you have any questions or concerns.  This information is not intended to replace advice given to you by your health care provider. Make sure you discuss any questions you have with your health care provider.  Document Released: 12/12/2002 Document Revised: 08/15/2017 Document Reviewed: 05/03/2016  [employer] Interactive Patient Education © 2017 [employer]

## 2021-08-17 NOTE — PROGRESS NOTES
Keshia Fletcher  1955 8/17/2021  Chief Complaint   Patient presents with   • GI Problem     dysphagia with meats and breads     Subjective   HPI  Keshia Fletcher is a 66 y.o. female who presents with a complaint of dysphagia progressive ongoing this year. She says that it is located in the upper esophageal region worse with meat such as hamburger and she is now not able to drink liquids when it is stuck it seems to not go down. So time makes it better. She says it has progressively worsened.   She has never had dilatation in the past. No nausea or vomiting. No wt loss. She is up to date with her colonoscopy.   GERD is stable with Pepcid as needed. She says she is not having indigestion or burning as much.   Past Medical History:   Diagnosis Date   • Acid reflux    • Arthritis    • Heart murmur    • Hx of colonic polyp    • Hypertension    • Hypothyroidism      Past Surgical History:   Procedure Laterality Date   • APPENDECTOMY  2007   • BREAST BIOPSY Left 5/25/2017    Procedure: LEFT ULTRASOUND GUIDED NEEDLE DIRECT EXCISIONAL BIOPSY, MAMMOGRAM TO FOLLOW, BREAST;  Surgeon: Dianne Honeycutt MD;  Location: [facility] OR;  Service:    • COLONOSCOPY N/A 3/31/2021    Sessille serrated adenoma at 40 cm, tubular adenoma at 30 cm repeat exam in 3 years   • COLONOSCOPY W/ POLYPECTOMY  04/16/2015    2 Hyperplastic polyps at 30 cm repeat exam in 5 years   • DILATION AND CURETTAGE, DIAGNOSTIC / THERAPEUTIC  1992, 2016    X 2    • LASIK Right        Outpatient Medications Marked as Taking for the 8/17/21 encounter (Office Visit) with Antonina Neri APRN   Medication Sig Dispense Refill   • alendronate (FOSAMAX) 70 MG tablet TAKE (1) TABLET ONCE A WEEK 12 tablet 3   • amLODIPine (NORVASC) 10 MG tablet Take 1 tablet by mouth Daily. 90 tablet 3   • Cholecalciferol (VITAMIN D3) 2000 UNITS capsule Take 2,000 Units by mouth Daily.     • estradiol (ESTRACE VAGINAL) 0.1 MG/GM vaginal cream Insert 2 g into the vagina 2 (Two) Times a  Week. 42.5 g 12   • famotidine (PEPCID) 10 MG tablet Take 10 mg by mouth As Needed for Heartburn.     • ibuprofen (ADVIL,MOTRIN) 800 MG tablet Take 1 tablet by mouth As Needed for Mild Pain . 90 tablet 3   • levothyroxine (SYNTHROID, LEVOTHROID) 100 MCG tablet Take 1 tablet by mouth Daily. 90 tablet 3   • spironolactone (ALDACTONE) 25 MG tablet TAKE (1) TABLET BY MOUTH DAILY 90 tablet 3     No Known Allergies  Social History     Socioeconomic History   • Marital status:      Spouse name: Not on file   • Number of children: Not on file   • Years of education: Not on file   • Highest education level: Not on file   Tobacco Use   • Smoking status: Never Smoker   • Smokeless tobacco: Never Used   Substance and Sexual Activity   • Alcohol use: Yes     Comment: OCCASIONALLY   • Drug use: No   • Sexual activity: Yes     Partners: Male     Family History   Problem Relation Age of Onset   • Pancreatic cancer Father    • Breast cancer Mother 51   • Colon polyps Sister    • No Known Problems Brother    • Breast cancer Paternal Aunt    • Colon cancer Neg Hx      Health Maintenance   Topic Date Due   • ZOSTER VACCINE (1 of 2) Never done   • Pneumococcal Vaccine 65+ (2 of 2 - PPSV23) 09/10/2020   • INFLUENZA VACCINE  10/01/2021   • PAP SMEAR  02/13/2022   • LIPID PANEL  05/14/2022   • ANNUAL WELLNESS VISIT  05/18/2022   • MAMMOGRAM  05/11/2023   • DXA SCAN  05/11/2023   • COLORECTAL CANCER SCREENING  03/31/2024   • TDAP/TD VACCINES (2 - Td or Tdap) 03/13/2027   • HEPATITIS C SCREENING  Completed   • COVID-19 Vaccine  Completed     Review of Systems   Constitutional: Negative for activity change, appetite change, chills, diaphoresis, fatigue, fever and unexpected weight change.   HENT: Positive for trouble swallowing. Negative for ear pain, hearing loss, mouth sores, sore throat and voice change.    Eyes: Negative.    Respiratory: Negative for cough, choking, shortness of breath and wheezing.    Cardiovascular: Negative for  "chest pain and palpitations.   Gastrointestinal: Negative for abdominal pain, blood in stool, constipation, diarrhea, nausea and vomiting.   Endocrine: Negative for cold intolerance and heat intolerance.   Genitourinary: Negative for decreased urine volume, dysuria, frequency, hematuria and urgency.   Musculoskeletal: Negative for back pain, gait problem and myalgias.   Skin: Negative for color change, pallor and rash.   Allergic/Immunologic: Negative for food allergies and immunocompromised state.   Neurological: Negative for dizziness, tremors, seizures, syncope, weakness, light-headedness, numbness and headaches.   Hematological: Negative for adenopathy. Does not bruise/bleed easily.   Psychiatric/Behavioral: Negative for agitation and confusion. The patient is not nervous/anxious.    All other systems reviewed and are negative.    Objective   Vitals:    08/17/21 0944   BP: 144/84   Pulse: 66   Temp: 98.2 °F (36.8 °C)   SpO2: 99%   Weight: 65.6 kg (144 lb 9.6 oz)   Height: 160 cm (63\")     Body mass index is 25.61 kg/m².  Physical Exam  Constitutional:       Appearance: She is well-developed.   HENT:      Head: Normocephalic and atraumatic.   Eyes:      Pupils: Pupils are equal, round, and reactive to light.   Neck:      Trachea: No tracheal deviation.   Cardiovascular:      Rate and Rhythm: Normal rate and regular rhythm.      Heart sounds: Normal heart sounds. No murmur heard.   No friction rub. No gallop.    Pulmonary:      Effort: Pulmonary effort is normal. No respiratory distress.      Breath sounds: Normal breath sounds. No wheezing or rales.   Chest:      Chest wall: No tenderness.   Abdominal:      General: Bowel sounds are normal. There is no distension.      Palpations: Abdomen is soft. Abdomen is not rigid.      Tenderness: There is no abdominal tenderness. There is no guarding or rebound.   Musculoskeletal:         General: No tenderness or deformity. Normal range of motion.      Cervical back: " Normal range of motion and neck supple.   Skin:     General: Skin is warm and dry.      Coloration: Skin is not pale.      Findings: No rash.   Neurological:      Mental Status: She is alert and oriented to person, place, and time.      Deep Tendon Reflexes: Reflexes are normal and symmetric.   Psychiatric:         Behavior: Behavior normal.         Thought Content: Thought content normal.         Judgment: Judgment normal.       Assessment/Plan   Diagnoses and all orders for this visit:    1. Esophageal dysphagia (Primary)  -     Case Request; Standing  -     Follow Anesthesia Guidelines / Standing Orders; Future  -     Obtain Informed Consent; Future  -     Implement Anesthesia Orders Day of Procedure; Standing  -     Obtain Informed Consent; Standing    2. HTN (hypertension), benign  Comments:  cont BP medication the day of procedure    I discussed non pharmaceutical treatment of gerd.  This includes gradually losing weight to achieve ideal body wt., elevation of the head of bed by 4-6 inches, nothing to eat or drink 3 hours prior to lying down, avoiding tight clothing, stress reduction, tobacco cessation, reduction of alcohol intake, and dietary restrictions (avoiding caffeine, coffee, fatty foods, mints, chocolate, spicy foods and tomato based sauces as much as possible).    Pepcid suggested or GAviscon if needed    ESOPHAGOGASTRODUODENOSCOPY WITH ANESTHESIA (N/A)  Part of this note may be an electronic transcription/translation of spoken language to printed text using the Dragon Dictation System.  Body mass index is 25.61 kg/m².  No follow-ups on file.    Patient's Body mass index is 25.61 kg/m². indicating that she is overweight (BMI 25-29.9). Obesity-related health conditions include the following: hypertension. Obesity is unchanged. BMI is is above average; BMI management plan is completed. We discussed portion control and increasing exercise..      All risks, benefits, alternatives, and indications of  colonoscopy and/or Endoscopy procedure have been discussed with the patient. Risks to include perforation of the colon requiring possible surgery or colostomy, risk of bleeding from biopsies or removal of colon tissue, possibility of missing a colon polyp or cancer, or adverse drug reaction.  Benefits to include the diagnosis and management of disease of the colon and rectum. Alternatives to include barium enema, radiographic evaluation, lab testing or no intervention. Pt verbalizes understanding and agrees.     Antonina Neri, APRN  8/17/2021  10:09 CDT          If you smoke or use tobacco, 4 minutes reading provided  Steps to Quit Smoking  Smoking tobacco can be harmful to your health and can affect almost every organ in your body. Smoking puts you, and those around you, at risk for developing many serious chronic diseases. Quitting smoking is difficult, but it is one of the best things that you can do for your health. It is never too late to quit.  What are the benefits of quitting smoking?  When you quit smoking, you lower your risk of developing serious diseases and conditions, such as:  · Lung cancer or lung disease, such as COPD.  · Heart disease.  · Stroke.  · Heart attack.  · Infertility.  · Osteoporosis and bone fractures.  Additionally, symptoms such as coughing, wheezing, and shortness of breath may get better when you quit. You may also find that you get sick less often because your body is stronger at fighting off colds and infections. If you are pregnant, quitting smoking can help to reduce your chances of having a baby of low birth weight.  How do I get ready to quit?  When you decide to quit smoking, create a plan to make sure that you are successful. Before you quit:  · Pick a date to quit. Set a date within the next two weeks to give you time to prepare.  · Write down the reasons why you are quitting. Keep this list in places where you will see it often, such as on your bathroom mirror or in  your car or wallet.  · Identify the people, places, things, and activities that make you want to smoke (triggers) and avoid them. Make sure to take these actions:  ¨ Throw away all cigarettes at home, at work, and in your car.  ¨ Throw away smoking accessories, such as ashtrays and lighters.  ¨ Clean your car and make sure to empty the ashtray.  ¨ Clean your home, including curtains and carpets.  · Tell your family, friends, and coworkers that you are quitting. Support from your loved ones can make quitting easier.  · Talk with your health care provider about your options for quitting smoking.  · Find out what treatment options are covered by your health insurance.  What strategies can I use to quit smoking?  Talk with your healthcare provider about different strategies to quit smoking. Some strategies include:  · Quitting smoking altogether instead of gradually lessening how much you smoke over a period of time. Research shows that quitting “cold turkey” is more successful than gradually quitting.  · Attending in-person counseling to help you build problem-solving skills. You are more likely to have success in quitting if you attend several counseling sessions. Even short sessions of 10 minutes can be effective.  · Finding resources and support systems that can help you to quit smoking and remain smoke-free after you quit. These resources are most helpful when you use them often. They can include:  ¨ Online chats with a counselor.  ¨ Telephone quitlines.  ¨ Printed self-help materials.  ¨ Support groups or group counseling.  ¨ Text messaging programs.  ¨ Mobile phone applications.  · Taking medicines to help you quit smoking. (If you are pregnant or breastfeeding, talk with your health care provider first.) Some medicines contain nicotine and some do not. Both types of medicines help with cravings, but the medicines that include nicotine help to relieve withdrawal symptoms. Your health care provider may  recommend:  ¨ Nicotine patches, gum, or lozenges.  ¨ Nicotine inhalers or sprays.  ¨ Non-nicotine medicine that is taken by mouth.  Talk with your health care provider about combining strategies, such as taking medicines while you are also receiving in-person counseling. Using these two strategies together makes you more likely to succeed in quitting than if you used either strategy on its own.  If you are pregnant or breastfeeding, talk with your health care provider about finding counseling or other support strategies to quit smoking. Do not take medicine to help you quit smoking unless told to do so by your health care provider.  What things can I do to make it easier to quit?  Quitting smoking might feel overwhelming at first, but there is a lot that you can do to make it easier. Take these important actions:  · Reach out to your family and friends and ask that they support and encourage you during this time. Call telephone quitlines, reach out to support groups, or work with a counselor for support.  · Ask people who smoke to avoid smoking around you.  · Avoid places that trigger you to smoke, such as bars, parties, or smoke-break areas at work.  · Spend time around people who do not smoke.  · Lessen stress in your life, because stress can be a smoking trigger for some people. To lessen stress, try:  ¨ Exercising regularly.  ¨ Deep-breathing exercises.  ¨ Yoga.  ¨ Meditating.  ¨ Performing a body scan. This involves closing your eyes, scanning your body from head to toe, and noticing which parts of your body are particularly tense. Purposefully relax the muscles in those areas.  · Download or purchase mobile phone or tablet apps (applications) that can help you stick to your quit plan by providing reminders, tips, and encouragement. There are many free apps, such as QuitGuide from the CDC (Centers for Disease Control and Prevention). You can find other support for quitting smoking (smoking cessation) through  smokefree.gov and other websites.  How will I feel when I quit smoking?  Within the first 24 hours of quitting smoking, you may start to feel some withdrawal symptoms. These symptoms are usually most noticeable 2-3 days after quitting, but they usually do not last beyond 2-3 weeks. Changes or symptoms that you might experience include:  · Mood swings.  · Restlessness, anxiety, or irritation.  · Difficulty concentrating.  · Dizziness.  · Strong cravings for sugary foods in addition to nicotine.  · Mild weight gain.  · Constipation.  · Nausea.  · Coughing or a sore throat.  · Changes in how your medicines work in your body.  · A depressed mood.  · Difficulty sleeping (insomnia).  After the first 2-3 weeks of quitting, you may start to notice more positive results, such as:  · Improved sense of smell and taste.  · Decreased coughing and sore throat.  · Slower heart rate.  · Lower blood pressure.  · Clearer skin.  · The ability to breathe more easily.  · Fewer sick days.  Quitting smoking is very challenging for most people. Do not get discouraged if you are not successful the first time. Some people need to make many attempts to quit before they achieve long-term success. Do your best to stick to your quit plan, and talk with your health care provider if you have any questions or concerns.  This information is not intended to replace advice given to you by your health care provider. Make sure you discuss any questions you have with your health care provider.  Document Released: 12/12/2002 Document Revised: 08/15/2017 Document Reviewed: 05/03/2016  [employer] Interactive Patient Education © 2017 [employer]

## 2021-08-19 ENCOUNTER — TELEPHONE (OUTPATIENT)
Dept: INTERNAL MEDICINE | Facility: CLINIC | Age: 66
End: 2021-08-19

## 2021-08-19 ENCOUNTER — TRANSCRIBE ORDERS (OUTPATIENT)
Dept: LAB | Facility: HOSPITAL | Age: 66
End: 2021-08-19

## 2021-08-19 DIAGNOSIS — Z01.818 PREOPERATIVE TESTING: Primary | ICD-10-CM

## 2021-08-19 NOTE — TELEPHONE ENCOUNTER
----- Message from Jose Justice MD sent at 8/18/2021  7:50 AM CDT -----  Results are normal other than's possibly some increased vascularity.  Have her get a T3-T4 TSH repeated

## 2021-08-21 ENCOUNTER — LAB (OUTPATIENT)
Dept: LAB | Facility: HOSPITAL | Age: 66
End: 2021-08-21

## 2021-08-21 LAB — SARS-COV-2 ORF1AB RESP QL NAA+PROBE: NOT DETECTED

## 2021-08-21 PROCEDURE — C9803 HOPD COVID-19 SPEC COLLECT: HCPCS | Performed by: INTERNAL MEDICINE

## 2021-08-21 PROCEDURE — U0004 COV-19 TEST NON-CDC HGH THRU: HCPCS | Performed by: INTERNAL MEDICINE

## 2021-08-24 ENCOUNTER — ANESTHESIA (OUTPATIENT)
Dept: GASTROENTEROLOGY | Facility: HOSPITAL | Age: 66
End: 2021-08-24

## 2021-08-24 ENCOUNTER — HOSPITAL ENCOUNTER (OUTPATIENT)
Facility: HOSPITAL | Age: 66
Setting detail: HOSPITAL OUTPATIENT SURGERY
Discharge: HOME | End: 2021-08-24
Attending: INTERNAL MEDICINE | Admitting: INTERNAL MEDICINE

## 2021-08-24 ENCOUNTER — PREP FOR SURGERY (OUTPATIENT)
Dept: GASTROENTEROLOGY | Facility: CLINIC | Age: 66
End: 2021-08-24

## 2021-08-24 ENCOUNTER — ANESTHESIA EVENT (OUTPATIENT)
Dept: GASTROENTEROLOGY | Facility: HOSPITAL | Age: 66
End: 2021-08-24

## 2021-08-24 VITALS
OXYGEN SATURATION: 95 % | HEIGHT: 63 IN | WEIGHT: 142 LBS | SYSTOLIC BLOOD PRESSURE: 104 MMHG | DIASTOLIC BLOOD PRESSURE: 66 MMHG | TEMPERATURE: 97.4 F | RESPIRATION RATE: 18 BRPM | BODY MASS INDEX: 25.16 KG/M2 | HEART RATE: 74 BPM

## 2021-08-24 DIAGNOSIS — R13.19 ESOPHAGEAL DYSPHAGIA: Primary | ICD-10-CM

## 2021-08-24 DIAGNOSIS — R13.19 ESOPHAGEAL DYSPHAGIA: ICD-10-CM

## 2021-08-24 PROCEDURE — 25010000002 PROPOFOL 10 MG/ML EMULSION: Performed by: NURSE ANESTHETIST, CERTIFIED REGISTERED

## 2021-08-24 PROCEDURE — 43249 ESOPH EGD DILATION <30 MM: CPT | Performed by: INTERNAL MEDICINE

## 2021-08-24 PROCEDURE — C1726 CATH, BAL DIL, NON-VASCULAR: HCPCS | Performed by: INTERNAL MEDICINE

## 2021-08-24 RX ORDER — SODIUM CHLORIDE 9 MG/ML
500 INJECTION, SOLUTION INTRAVENOUS CONTINUOUS PRN
Status: DISCONTINUED | OUTPATIENT
Start: 2021-08-24 | End: 2021-08-24 | Stop reason: HOSPADM

## 2021-08-24 RX ORDER — LIDOCAINE HYDROCHLORIDE 10 MG/ML
0.5 INJECTION, SOLUTION EPIDURAL; INFILTRATION; INTRACAUDAL; PERINEURAL ONCE AS NEEDED
Status: CANCELLED | OUTPATIENT
Start: 2021-08-24

## 2021-08-24 RX ORDER — LIDOCAINE HYDROCHLORIDE 20 MG/ML
INJECTION, SOLUTION EPIDURAL; INFILTRATION; INTRACAUDAL; PERINEURAL AS NEEDED
Status: DISCONTINUED | OUTPATIENT
Start: 2021-08-24 | End: 2021-08-24 | Stop reason: SURG

## 2021-08-24 RX ORDER — PROPOFOL 10 MG/ML
VIAL (ML) INTRAVENOUS AS NEEDED
Status: DISCONTINUED | OUTPATIENT
Start: 2021-08-24 | End: 2021-08-24 | Stop reason: SURG

## 2021-08-24 RX ORDER — ESOMEPRAZOLE MAGNESIUM 40 MG/1
40 CAPSULE, DELAYED RELEASE ORAL DAILY
Qty: 90 CAPSULE | Refills: 3 | Status: SHIPPED | OUTPATIENT
Start: 2021-08-24 | End: 2021-11-17 | Stop reason: SDDI

## 2021-08-24 RX ORDER — SODIUM CHLORIDE 0.9 % (FLUSH) 0.9 %
10 SYRINGE (ML) INJECTION AS NEEDED
Status: DISCONTINUED | OUTPATIENT
Start: 2021-08-24 | End: 2021-08-24 | Stop reason: HOSPADM

## 2021-08-24 RX ADMIN — PROPOFOL 300 MG: 10 INJECTION, EMULSION INTRAVENOUS at 12:49

## 2021-08-24 RX ADMIN — LIDOCAINE HYDROCHLORIDE 130 MG: 20 INJECTION, SOLUTION EPIDURAL; INFILTRATION; INTRACAUDAL; PERINEURAL at 12:49

## 2021-08-24 RX ADMIN — GLYCOPYRROLATE 0.1 MG: 0.2 INJECTION, SOLUTION INTRAMUSCULAR; INTRAVENOUS at 12:47

## 2021-08-24 RX ADMIN — SODIUM CHLORIDE: 0.9 INJECTION, SOLUTION INTRAVENOUS at 12:46

## 2021-08-24 NOTE — ANESTHESIA POSTPROCEDURE EVALUATION
"Patient: Keshia Fletcher    Procedure Summary     Date: 08/24/21 Room / Location: [facility] ENDOSCOPY 6 / [facility]    Anesthesia Start: 1246 Anesthesia Stop: 1257    Procedure: ESOPHAGOGASTRODUODENOSCOPY WITH ANESTHESIA (N/A ) Diagnosis:       Esophageal dysphagia      (Esophageal dysphagia [R13.10])    Surgeons: Tomas Ervin MD Provider: Sebastian Ivan CRNA    Anesthesia Type: MAC ASA Status: 2          Anesthesia Type: MAC    Vitals  Vitals Value Taken Time   BP     Temp     Pulse 85 08/24/21 1300   Resp     SpO2 93 % 08/24/21 1300   Vitals shown include unvalidated device data.        Post Anesthesia Care and Evaluation    Patient location during evaluation: PHASE II  Patient participation: complete - patient participated  Level of consciousness: awake and alert  Pain score: 0  Pain management: adequate  Airway patency: patent  Anesthetic complications: No anesthetic complications  PONV Status: none  Cardiovascular status: acceptable  Respiratory status: acceptable  Hydration status: acceptable    Comments: Blood pressure 125/74, pulse 69, temperature 97.4 °F (36.3 °C), temperature source Temporal, resp. rate 18, height 160 cm (63\"), weight 64.4 kg (142 lb), SpO2 96 %, not currently breastfeeding.    Pt discharged from PACU based on palmer score >8      "

## 2021-08-24 NOTE — ANESTHESIA PREPROCEDURE EVALUATION
Anesthesia Evaluation     Patient summary reviewed and Nursing notes reviewed   no history of anesthetic complications:  NPO Solid Status: > 8 hours  NPO Liquid Status: > 2 hours           Airway   Mallampati: I  TM distance: >3 FB  Neck ROM: full  No difficulty expected  Dental - normal exam     Pulmonary - negative pulmonary ROS   (-) asthma, sleep apnea, not a smoker  Cardiovascular   Exercise tolerance: good (4-7 METS)    (+) hypertension, valvular problems/murmurs murmur, hyperlipidemia,   (-) past MI, CAD, dysrhythmias, cardiac stents      Neuro/Psych- negative ROS  (-) seizures, TIA, CVA  GI/Hepatic/Renal/Endo    (+)  GERD,  thyroid problem hypothyroidism  (-) liver disease, no renal disease, diabetes    Musculoskeletal     Abdominal    Substance History - negative use     OB/GYN negative ob/gyn ROS         Other   arthritis,                        Anesthesia Plan    ASA 2     MAC     intravenous induction     Anesthetic plan, all risks, benefits, and alternatives have been provided, discussed and informed consent has been obtained with: patient.

## 2021-08-24 NOTE — DISCHARGE INSTRUCTIONS
Repeat endo September 14th with a 10 am arrival time, the office will mail instructions to the patient

## 2021-08-30 ENCOUNTER — TELEPHONE (OUTPATIENT)
Dept: INTERNAL MEDICINE | Facility: CLINIC | Age: 66
End: 2021-08-30

## 2021-08-30 NOTE — TELEPHONE ENCOUNTER
----- Message from Jose Justice MD sent at 8/30/2021  7:32 AM CDT -----  Labs okay continue same dosage

## 2021-09-08 ENCOUNTER — TRANSCRIBE ORDERS (OUTPATIENT)
Dept: LAB | Facility: HOSPITAL | Age: 66
End: 2021-09-08

## 2021-09-08 DIAGNOSIS — Z01.818 PREOPERATIVE TESTING: Primary | ICD-10-CM

## 2021-09-11 ENCOUNTER — LAB (OUTPATIENT)
Dept: LAB | Facility: HOSPITAL | Age: 66
End: 2021-09-11

## 2021-09-11 PROCEDURE — U0004 COV-19 TEST NON-CDC HGH THRU: HCPCS | Performed by: INTERNAL MEDICINE

## 2021-09-11 PROCEDURE — C9803 HOPD COVID-19 SPEC COLLECT: HCPCS | Performed by: INTERNAL MEDICINE

## 2021-09-12 LAB — SARS-COV-2 ORF1AB RESP QL NAA+PROBE: NOT DETECTED

## 2021-09-14 ENCOUNTER — ANESTHESIA (OUTPATIENT)
Dept: GASTROENTEROLOGY | Facility: HOSPITAL | Age: 66
End: 2021-09-14

## 2021-09-14 ENCOUNTER — HOSPITAL ENCOUNTER (OUTPATIENT)
Facility: HOSPITAL | Age: 66
Setting detail: HOSPITAL OUTPATIENT SURGERY
Discharge: HOME | End: 2021-09-14
Attending: INTERNAL MEDICINE | Admitting: INTERNAL MEDICINE

## 2021-09-14 ENCOUNTER — ANESTHESIA EVENT (OUTPATIENT)
Dept: GASTROENTEROLOGY | Facility: HOSPITAL | Age: 66
End: 2021-09-14

## 2021-09-14 VITALS
SYSTOLIC BLOOD PRESSURE: 104 MMHG | HEART RATE: 73 BPM | DIASTOLIC BLOOD PRESSURE: 62 MMHG | BODY MASS INDEX: 25.16 KG/M2 | HEIGHT: 63 IN | WEIGHT: 142 LBS | OXYGEN SATURATION: 94 % | RESPIRATION RATE: 17 BRPM | TEMPERATURE: 97.1 F

## 2021-09-14 DIAGNOSIS — R13.19 ESOPHAGEAL DYSPHAGIA: ICD-10-CM

## 2021-09-14 PROCEDURE — 25010000002 PROPOFOL 10 MG/ML EMULSION: Performed by: NURSE ANESTHETIST, CERTIFIED REGISTERED

## 2021-09-14 PROCEDURE — 43239 EGD BIOPSY SINGLE/MULTIPLE: CPT | Performed by: INTERNAL MEDICINE

## 2021-09-14 PROCEDURE — 43450 DILATE ESOPHAGUS 1/MULT PASS: CPT | Performed by: INTERNAL MEDICINE

## 2021-09-14 PROCEDURE — 25010000002 ONDANSETRON PER 1 MG: Performed by: ANESTHESIOLOGY

## 2021-09-14 PROCEDURE — 87081 CULTURE SCREEN ONLY: CPT | Performed by: INTERNAL MEDICINE

## 2021-09-14 RX ORDER — SODIUM CHLORIDE 0.9 % (FLUSH) 0.9 %
10 SYRINGE (ML) INJECTION AS NEEDED
Status: DISCONTINUED | OUTPATIENT
Start: 2021-09-14 | End: 2021-09-14 | Stop reason: HOSPADM

## 2021-09-14 RX ORDER — SODIUM CHLORIDE 9 MG/ML
500 INJECTION, SOLUTION INTRAVENOUS CONTINUOUS PRN
Status: DISCONTINUED | OUTPATIENT
Start: 2021-09-14 | End: 2021-09-14 | Stop reason: HOSPADM

## 2021-09-14 RX ORDER — PROPOFOL 10 MG/ML
VIAL (ML) INTRAVENOUS AS NEEDED
Status: DISCONTINUED | OUTPATIENT
Start: 2021-09-14 | End: 2021-09-14 | Stop reason: SURG

## 2021-09-14 RX ORDER — SODIUM CHLORIDE 9 MG/ML
100 INJECTION, SOLUTION INTRAVENOUS CONTINUOUS
Status: CANCELLED | OUTPATIENT
Start: 2021-09-14

## 2021-09-14 RX ORDER — ONDANSETRON 2 MG/ML
4 INJECTION INTRAMUSCULAR; INTRAVENOUS ONCE AS NEEDED
Status: COMPLETED | OUTPATIENT
Start: 2021-09-14 | End: 2021-09-14

## 2021-09-14 RX ORDER — SODIUM CHLORIDE 0.9 % (FLUSH) 0.9 %
10 SYRINGE (ML) INJECTION AS NEEDED
Status: CANCELLED | OUTPATIENT
Start: 2021-09-14

## 2021-09-14 RX ORDER — FAMOTIDINE 10 MG/ML
20 INJECTION, SOLUTION INTRAVENOUS
Status: COMPLETED | OUTPATIENT
Start: 2021-09-14 | End: 2021-09-14

## 2021-09-14 RX ORDER — SODIUM CHLORIDE 0.9 % (FLUSH) 0.9 %
10 SYRINGE (ML) INJECTION EVERY 12 HOURS SCHEDULED
Status: CANCELLED | OUTPATIENT
Start: 2021-09-14

## 2021-09-14 RX ORDER — LIDOCAINE HYDROCHLORIDE 20 MG/ML
INJECTION, SOLUTION EPIDURAL; INFILTRATION; INTRACAUDAL; PERINEURAL AS NEEDED
Status: DISCONTINUED | OUTPATIENT
Start: 2021-09-14 | End: 2021-09-14 | Stop reason: SURG

## 2021-09-14 RX ADMIN — FAMOTIDINE 20 MG: 10 INJECTION INTRAVENOUS at 11:48

## 2021-09-14 RX ADMIN — ONDANSETRON HYDROCHLORIDE 4 MG: 2 SOLUTION INTRAMUSCULAR; INTRAVENOUS at 11:49

## 2021-09-14 RX ADMIN — PROPOFOL 100 MG: 10 INJECTION, EMULSION INTRAVENOUS at 11:57

## 2021-09-14 RX ADMIN — SODIUM CHLORIDE 500 ML: 9 INJECTION, SOLUTION INTRAVENOUS at 10:09

## 2021-09-14 RX ADMIN — LIDOCAINE HYDROCHLORIDE 100 MG: 20 INJECTION, SOLUTION EPIDURAL; INFILTRATION; INTRACAUDAL; PERINEURAL at 11:57

## 2021-09-14 RX ADMIN — GLYCOPYRROLATE 0.2 MG: 0.2 INJECTION, SOLUTION INTRAMUSCULAR; INTRAVENOUS at 11:53

## 2021-09-14 NOTE — ANESTHESIA PREPROCEDURE EVALUATION
Anesthesia Evaluation     Nursing notes reviewed   no history of anesthetic complications:  NPO Solid Status: > 8 hours  NPO Liquid Status: > 8 hours           Airway   Mallampati: I  Neck ROM: full  No difficulty expected  Dental      Pulmonary    (-) sleep apnea, not a smoker  Cardiovascular   Exercise tolerance: good (4-7 METS)    (+) hypertension, valvular problems/murmurs murmur, hyperlipidemia,     ROS comment: Echo 1/24/20  · Left ventricular systolic function is normal. Estimated ejection fraction is 66-70%.  · Left ventricular diastolic dysfunction.  · Normal right ventricular cavity size and systolic function noted.  · Mild aortic valve regurgitation is present.       Neuro/Psych  (-) seizures, TIA, CVA  GI/Hepatic/Renal/Endo    (+)  GERD,  thyroid problem hypothyroidism  (-) liver disease, no renal disease, diabetes    Musculoskeletal     Abdominal    Substance History      OB/GYN          Other   arthritis,                      Anesthesia Plan    ASA 2     MAC     intravenous induction     Anesthetic plan, all risks, benefits, and alternatives have been provided, discussed and informed consent has been obtained with: patient.

## 2021-09-14 NOTE — ANESTHESIA POSTPROCEDURE EVALUATION
Patient: Keshia Fletcher    Procedure Summary     Date: 09/14/21 Room / Location: [facility] ENDOSCOPY 2 / [facility]    Anesthesia Start: 1156 Anesthesia Stop: 1203    Procedure: ESOPHAGOGASTRODUODENOSCOPY WITH ANESTHESIA (N/A ) Diagnosis:       Esophageal dysphagia      (Esophageal dysphagia [R13.10])    Surgeons: Tomas Ervin MD Provider: PATY Rivera CRNA    Anesthesia Type: MAC ASA Status: 2          Anesthesia Type: MAC    Vitals  No vitals data found for the desired time range.          Post Anesthesia Care and Evaluation    Patient location during evaluation: PACU  Patient participation: complete - patient participated  Level of consciousness: awake and alert  Pain score: 0  Pain management: adequate  Airway patency: patent  Anesthetic complications: No anesthetic complications    Cardiovascular status: acceptable and stable  Respiratory status: acceptable and unassisted  Hydration status: acceptable

## 2021-09-15 LAB — UREASE TISS QL: NEGATIVE

## 2021-10-20 RX ORDER — VALACYCLOVIR HYDROCHLORIDE 1 G/1
TABLET, FILM COATED ORAL
Qty: 30 TABLET | Refills: 2 | Status: SHIPPED | OUTPATIENT
Start: 2021-10-20

## 2021-10-25 ENCOUNTER — TELEPHONE (OUTPATIENT)
Dept: GASTROENTEROLOGY | Facility: CLINIC | Age: 66
End: 2021-10-25

## 2021-10-25 ENCOUNTER — LAB (OUTPATIENT)
Dept: LAB | Facility: HOSPITAL | Age: 66
End: 2021-10-25

## 2021-10-25 ENCOUNTER — TRANSCRIBE ORDERS (OUTPATIENT)
Dept: ADMINISTRATIVE | Facility: HOSPITAL | Age: 66
End: 2021-10-25

## 2021-10-25 DIAGNOSIS — Z48.02 ENCOUNTER FOR REMOVAL OF SUTURES: Primary | ICD-10-CM

## 2021-10-25 DIAGNOSIS — Z48.02 ENCOUNTER FOR REMOVAL OF SUTURES: ICD-10-CM

## 2021-10-25 DIAGNOSIS — L57.0 ACTINIC KERATOSIS: ICD-10-CM

## 2021-10-25 DIAGNOSIS — R89.7 NONSPECIFIC ABNORMAL HISTOLOGICAL FINDINGS: ICD-10-CM

## 2021-10-25 PROCEDURE — 86235 NUCLEAR ANTIGEN ANTIBODY: CPT

## 2021-10-25 PROCEDURE — 86225 DNA ANTIBODY NATIVE: CPT

## 2021-10-25 PROCEDURE — 36415 COLL VENOUS BLD VENIPUNCTURE: CPT

## 2021-10-25 PROCEDURE — 86038 ANTINUCLEAR ANTIBODIES: CPT

## 2021-10-25 NOTE — TELEPHONE ENCOUNTER
Patient called stating she started having a rash after we put her on Nexium she went to [facility] and they took a biopsy of the rash and it came back as possible medication related wants to know if she can stop the Nexium for awhile and see if that's what caused it told her yes certainly she should told her she can  some OTC Pepcid and take as directed

## 2021-10-26 LAB
ANA HOMOGEN TITR SER: NORMAL {TITER}
ANA SPECKLED TITR SER: NORMAL {TITER}
ANA TITR SER IF: POSITIVE {TITER}
DSDNA AB SER-ACNC: 7 IU/ML (ref 0–9)
ENA RNP AB SER-ACNC: 0.2 AI (ref 0–0.9)
ENA SCL70 AB SER-ACNC: <0.2 AI (ref 0–0.9)
ENA SM AB SER-ACNC: <0.2 AI (ref 0–0.9)
ENA SS-A AB SER-ACNC: 4.9 AI (ref 0–0.9)
ENA SS-B AB SER-ACNC: >8 AI (ref 0–0.9)
Lab: NORMAL

## 2021-10-27 LAB — HISTONE IGG SER IA-ACNC: 0.8 UNITS (ref 0–0.9)

## 2021-11-17 ENCOUNTER — OFFICE VISIT (OUTPATIENT)
Dept: INTERNAL MEDICINE | Facility: CLINIC | Age: 66
End: 2021-11-17

## 2021-11-17 VITALS
WEIGHT: 142 LBS | OXYGEN SATURATION: 93 % | TEMPERATURE: 96.9 F | HEART RATE: 63 BPM | DIASTOLIC BLOOD PRESSURE: 82 MMHG | HEIGHT: 63 IN | SYSTOLIC BLOOD PRESSURE: 140 MMHG | BODY MASS INDEX: 25.16 KG/M2

## 2021-11-17 DIAGNOSIS — I10 BENIGN HYPERTENSION: ICD-10-CM

## 2021-11-17 DIAGNOSIS — Z23 NEED FOR INFLUENZA VACCINATION: Primary | ICD-10-CM

## 2021-11-17 PROCEDURE — 90662 IIV NO PRSV INCREASED AG IM: CPT | Performed by: INTERNAL MEDICINE

## 2021-11-17 PROCEDURE — G0008 ADMIN INFLUENZA VIRUS VAC: HCPCS | Performed by: INTERNAL MEDICINE

## 2021-11-17 PROCEDURE — 99214 OFFICE O/P EST MOD 30 MIN: CPT | Performed by: INTERNAL MEDICINE

## 2021-11-17 RX ORDER — FAMOTIDINE 40 MG/1
40 TABLET, FILM COATED ORAL DAILY
Qty: 90 TABLET | Refills: 3 | Status: SHIPPED | OUTPATIENT
Start: 2021-11-17 | End: 2022-11-08 | Stop reason: SDUPTHER

## 2021-11-17 NOTE — PROGRESS NOTES
"Subjective   Keshia Fletcher is a 66 y.o. female.   Chief Complaint   Patient presents with   • Hypertension     6 mos. FU   • Rash     Had a skin biopsy by Dr. JAY Hernandez and says lab work showed \"auto immune\" and \"drug allergy\".  She stopped taking the Nexium because of this.     • Heartburn     Had endosocpies recently for GERD and asking if Fosamax could be contributing to her sx's.       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  She stopped her Nexium therapy but is still having drug eruption.  She also recently had an endoscopy she has had a lot of GERD she is currently taking Fosamax.    The following portions of the patient's history were reviewed and updated as appropriate: allergies, current medications, past family history, past medical history, past social history, past surgical history and problem list.    Review of Systems   Constitutional: Negative for activity change, appetite change, fatigue, fever, unexpected weight gain and unexpected weight loss.   HENT: Negative for swollen glands, trouble swallowing and voice change.    Eyes: Negative for blurred vision and visual disturbance.   Respiratory: Negative for cough and shortness of breath.    Cardiovascular: Negative for chest pain, palpitations and leg swelling.   Gastrointestinal: Negative for abdominal pain, constipation, diarrhea, nausea, vomiting and indigestion.   Endocrine: Negative for cold intolerance, heat intolerance, polydipsia and polyphagia.   Genitourinary: Negative for dysuria and frequency.   Musculoskeletal: Negative for arthralgias, back pain, joint swelling and neck pain.   Skin: Negative for color change, rash and skin lesions.   Neurological: Negative for dizziness, weakness, headache, memory problem and confusion.   Hematological: Does not bruise/bleed easily.   Psychiatric/Behavioral: Negative for agitation, hallucinations and suicidal ideas. " The patient is not nervous/anxious.        Objective   Past Medical History:   Diagnosis Date   • Acid reflux    • Arthritis    • Heart murmur    • Hx of colonic polyp    • Hypertension    • Hypothyroidism       Past Surgical History:   Procedure Laterality Date   • APPENDECTOMY  2007   • BREAST BIOPSY Left 5/25/2017    Procedure: LEFT ULTRASOUND GUIDED NEEDLE DIRECT EXCISIONAL BIOPSY, MAMMOGRAM TO FOLLOW, BREAST;  Surgeon: Dianne Honeycutt MD;  Location: [facility] OR;  Service:    • COLONOSCOPY N/A 3/31/2021    Sessille serrated adenoma at 40 cm, tubular adenoma at 30 cm repeat exam in 3 years   • COLONOSCOPY W/ POLYPECTOMY  04/16/2015    2 Hyperplastic polyps at 30 cm repeat exam in 5 years   • DILATION AND CURETTAGE, DIAGNOSTIC / THERAPEUTIC  1992, 2016    X 2    • ENDOSCOPY N/A 8/24/2021    Procedure: ESOPHAGOGASTRODUODENOSCOPY WITH ANESTHESIA;  Surgeon: Tomas Ervin MD;  Location: [facility] ENDOSCOPY;  Service: Gastroenterology;  Laterality: N/A;  pre dysphagia  post stricture  Jose Justice MD   • ENDOSCOPY N/A 9/14/2021    Procedure: ESOPHAGOGASTRODUODENOSCOPY WITH ANESTHESIA;  Surgeon: Tomas Ervin MD;  Location: [facility] ENDOSCOPY;  Service: Gastroenterology;  Laterality: N/A;  preop; dysphagia  postop; stricture   PCP Jose Justice    • LASIK Right         Current Outpatient Medications:   •  amLODIPine (NORVASC) 10 MG tablet, Take 1 tablet by mouth Daily., Disp: 90 tablet, Rfl: 3  •  Cholecalciferol (VITAMIN D3) 2000 UNITS capsule, Take 2,000 Units by mouth Daily., Disp: , Rfl:   •  estradiol (ESTRACE VAGINAL) 0.1 MG/GM vaginal cream, Insert 2 g into the vagina 2 (Two) Times a Week., Disp: 42.5 g, Rfl: 12  •  ibuprofen (ADVIL,MOTRIN) 800 MG tablet, Take 1 tablet by mouth As Needed for Mild Pain ., Disp: 90 tablet, Rfl: 3  •  levothyroxine (SYNTHROID, LEVOTHROID) 100 MCG tablet, Take 1 tablet by mouth Daily., Disp: 90 tablet, Rfl: 3  •  spironolactone (ALDACTONE) 25 MG tablet, TAKE (1) TABLET BY MOUTH  DAILY, Disp: 90 tablet, Rfl: 3  •  valACYclovir (VALTREX) 1000 MG tablet, TAKE (1) TABLET TWICE A DAY AS NEEDED, Disp: 30 tablet, Rfl: 2  •  famotidine (Pepcid) 40 MG tablet, Take 1 tablet by mouth Daily., Disp: 90 tablet, Rfl: 3      Vitals:    11/17/21 0911   BP: 140/82   Pulse: 63   Temp: 96.9 °F (36.1 °C)   SpO2: 93%         11/17/21 0911   Weight: 64.4 kg (142 lb)       Body mass index is 25.15 kg/m².    Physical Exam  Constitutional:       Appearance: She is well-developed.   HENT:      Head: Normocephalic and atraumatic.   Eyes:      Pupils: Pupils are equal, round, and reactive to light.   Cardiovascular:      Rate and Rhythm: Normal rate and regular rhythm.   Pulmonary:      Effort: Pulmonary effort is normal.      Breath sounds: Normal breath sounds.   Abdominal:      General: Bowel sounds are normal.      Palpations: Abdomen is soft.   Musculoskeletal:         General: Normal range of motion.      Cervical back: Normal range of motion and neck supple.   Skin:     General: Skin is warm and dry.   Neurological:      Mental Status: She is alert and oriented to person, place, and time.   Psychiatric:         Behavior: Behavior normal.               Assessment/Plan   Diagnoses and all orders for this visit:    1. Need for influenza vaccination (Primary)  -     Fluzone High-Dose 65+yrs    2. Benign hypertension  -     Basic Metabolic Panel    Other orders  -     famotidine (Pepcid) 40 MG tablet; Take 1 tablet by mouth Daily.  Dispense: 90 tablet; Refill: 3      Patient is here for follow-up of hypertension she is taking her medications as prescribed.  In addition to that she has had a recent skin biopsy which showed a drug eruption.  On her exam I saw very dry skin she has several excoriations from where she is scratched.  The worst of it seems to be on her upper torso.  I have encouraged her not to take baths or showers on a regular basis to lubricate her skin with lotion and coconut oil.  In addition to that  I have asked her to stop her Nexium and try Pepcid I have also asked her to stop her Fosamax due to the drug eruption.  I suspect the Fosamax is because GERD as well as a drug eruption.

## 2021-11-18 LAB
BUN SERPL-MCNC: 15 MG/DL (ref 8–23)
BUN/CREAT SERPL: 15.8 (ref 7–25)
CALCIUM SERPL-MCNC: 9.9 MG/DL (ref 8.6–10.5)
CHLORIDE SERPL-SCNC: 103 MMOL/L (ref 98–107)
CO2 SERPL-SCNC: 26.5 MMOL/L (ref 22–29)
CREAT SERPL-MCNC: 0.95 MG/DL (ref 0.57–1)
GLUCOSE SERPL-MCNC: 91 MG/DL (ref 65–99)
POTASSIUM SERPL-SCNC: 4.3 MMOL/L (ref 3.5–5.2)
SODIUM SERPL-SCNC: 139 MMOL/L (ref 136–145)

## 2022-02-07 RX ORDER — AMLODIPINE BESYLATE 10 MG/1
TABLET ORAL
Qty: 90 TABLET | Refills: 1 | Status: SHIPPED | OUTPATIENT
Start: 2022-02-07 | End: 2022-05-11 | Stop reason: SDUPTHER

## 2022-05-11 RX ORDER — AMLODIPINE BESYLATE 10 MG/1
10 TABLET ORAL DAILY
Qty: 90 TABLET | Refills: 1 | Status: SHIPPED | OUTPATIENT
Start: 2022-05-11 | End: 2022-08-09

## 2022-05-11 NOTE — TELEPHONE ENCOUNTER
Caller: JosseKeshia landaverde    Relationship: Self    Best call back number: [phone]    Requested Prescriptions:   Requested Prescriptions     Pending Prescriptions Disp Refills   • amLODIPine (NORVASC) 10 MG tablet 90 tablet 1     Sig: Take 1 tablet by mouth Daily.        Pharmacy where request should be sent: [facility] [address] [phone] [facility] [phone] [facility]         Does the patient have less than a 3 day supply:  [x] Yes  [] No    Bailey ARANA Rep   05/11/22 14:12 CDT

## 2022-05-13 RX ORDER — LEVOTHYROXINE SODIUM 0.1 MG/1
100 TABLET ORAL DAILY
Qty: 90 TABLET | Refills: 3 | Status: SHIPPED | OUTPATIENT
Start: 2022-05-13

## 2022-05-19 ENCOUNTER — OFFICE VISIT (OUTPATIENT)
Dept: INTERNAL MEDICINE | Facility: CLINIC | Age: 67
End: 2022-05-19

## 2022-05-19 VITALS
WEIGHT: 152 LBS | HEIGHT: 65 IN | SYSTOLIC BLOOD PRESSURE: 120 MMHG | OXYGEN SATURATION: 98 % | TEMPERATURE: 97.1 F | BODY MASS INDEX: 25.33 KG/M2 | HEART RATE: 65 BPM | DIASTOLIC BLOOD PRESSURE: 76 MMHG

## 2022-05-19 DIAGNOSIS — E03.9 ACQUIRED HYPOTHYROIDISM: ICD-10-CM

## 2022-05-19 DIAGNOSIS — I10 BENIGN HYPERTENSION: ICD-10-CM

## 2022-05-19 DIAGNOSIS — M25.541 ARTHRALGIA OF BOTH HANDS: ICD-10-CM

## 2022-05-19 DIAGNOSIS — Z00.00 ENCOUNTER FOR ANNUAL WELLNESS EXAM IN MEDICARE PATIENT: Primary | ICD-10-CM

## 2022-05-19 DIAGNOSIS — R01.1 SYSTOLIC MURMUR: ICD-10-CM

## 2022-05-19 DIAGNOSIS — M25.542 ARTHRALGIA OF BOTH HANDS: ICD-10-CM

## 2022-05-19 DIAGNOSIS — E78.00 ELEVATED CHOLESTEROL: ICD-10-CM

## 2022-05-19 PROCEDURE — 1170F FXNL STATUS ASSESSED: CPT

## 2022-05-19 PROCEDURE — 1159F MED LIST DOCD IN RCRD: CPT

## 2022-05-19 PROCEDURE — 1126F AMNT PAIN NOTED NONE PRSNT: CPT

## 2022-05-19 PROCEDURE — 96160 PT-FOCUSED HLTH RISK ASSMT: CPT

## 2022-05-19 PROCEDURE — G0439 PPPS, SUBSEQ VISIT: HCPCS

## 2022-05-19 NOTE — PROGRESS NOTES
QUICK REFERENCE INFORMATION:  The ABCs of the Annual Wellness Visit    Subsequent Medicare Wellness Visit    HEALTH RISK ASSESSMENT    : 1955    Recent Hospitalizations:  No hospitalization(s) within the last year..  ccc      Current Medical Providers:  Patient Care Team:  Alyssa Manning DO as PCP - Internal Medicine (Family Medicine)  Tomas Ervin MD as Consulting Physician (Gastroenterology)        Smoking Status:  Social History     Tobacco Use   Smoking Status Never Smoker   Smokeless Tobacco Never Used       Alcohol Consumption:  Social History     Substance and Sexual Activity   Alcohol Use Yes    Comment: OCCASIONALLY       Depression Screen:   PHQ-2/PHQ-9 Depression Screening 2022   Retired PHQ-9 Total Score -   Retired Total Score -   Little Interest or Pleasure in Doing Things 0-->not at all   Feeling Down, Depressed or Hopeless 0-->not at all   PHQ-9: Brief Depression Severity Measure Score 0       Health Habits and Functional and Cognitive Screening:  Functional & Cognitive Status 2022   Do you have difficulty preparing food and eating? No   Do you have difficulty bathing yourself, getting dressed or grooming yourself? No   Do you have difficulty using the toilet? No   Do you have difficulty moving around from place to place? No   Do you have trouble with steps or getting out of a bed or a chair? No   Current Diet Well Balanced Diet   Dental Exam Up to date   Eye Exam Up to date   Exercise (times per week) 5 times per week   Current Exercises Include Walking   Do you need help using the phone?  No   Are you deaf or do you have serious difficulty hearing?  No   Do you need help with transportation? No   Do you need help shopping? No   Do you need help preparing meals?  No   Do you need help with housework?  No   Do you need help with laundry? No   Do you need help taking your medications? No   Do you need help managing money? No   Do you ever drive or ride in a car without  wearing a seat belt? No   Have you felt unusual stress, anger or loneliness in the last month? No   Who do you live with? Spouse   If you need help, do you have trouble finding someone available to you? No   Have you been bothered in the last four weeks by sexual problems? No   Do you have difficulty concentrating, remembering or making decisions? No       Visual Acuity:  No exam data present    Does the patient have evidence of cognitive impairment? No    Asiprin use counseling: Does not need ASA (and currently is not on it)      Recent Lab Results:          Lab Results   Component Value Date    TRIG 102 05/13/2022    HDL 57 05/13/2022    VLDL 18 05/13/2022           Age-appropriate Screening Schedule:  Refer to the list below for future screening recommendations based on patient's age, sex and/or medical conditions. Orders for these recommended tests are listed in the plan section. The patient has been provided with a written plan.    Health Maintenance   Topic Date Due   • PAP SMEAR  07/11/2022 (Originally 2/13/2022)   • ZOSTER VACCINE (1 of 2) 05/19/2023 (Originally 4/20/2003)   • INFLUENZA VACCINE  08/01/2022   • MAMMOGRAM  05/11/2023   • DXA SCAN  05/11/2023   • LIPID PANEL  05/13/2023   • TDAP/TD VACCINES (2 - Td or Tdap) 03/13/2027        Subjective   History of Present Illness    Keshia Fletcher is a 67 y.o. female who presents for an Annual Wellness Visit.  She denies any current complaints or issues.  States that her blood pressure has been running well at home.  Pickle daily diet consists of eggs and toast in the morning sandwich and chips for lunch and dinner is typically made in the air Fryer.  Notes that she takes ibuprofen for joint pain in her fingers.  Never takes more than 3 doses a week.   She denies any changes in her family history except for that her sister is currently dealing with liver and renal failure for unexplained reasons.    The following portions of the patient's history were reviewed  and updated as appropriate: allergies, current medications, past family history, past medical history, past social history, past surgical history and problem list.    Outpatient Medications Prior to Visit   Medication Sig Dispense Refill   • amLODIPine (NORVASC) 10 MG tablet Take 1 tablet by mouth Daily. 90 tablet 1   • Cholecalciferol (VITAMIN D3) 2000 UNITS capsule Take 2,000 Units by mouth Daily.     • famotidine (Pepcid) 40 MG tablet Take 1 tablet by mouth Daily. 90 tablet 3   • ibuprofen (ADVIL,MOTRIN) 800 MG tablet Take 1 tablet by mouth As Needed for Mild Pain . 90 tablet 3   • levothyroxine (SYNTHROID, LEVOTHROID) 100 MCG tablet Take 1 tablet by mouth Daily. 90 tablet 3   • spironolactone (ALDACTONE) 25 MG tablet TAKE (1) TABLET BY MOUTH DAILY 90 tablet 3   • valACYclovir (VALTREX) 1000 MG tablet TAKE (1) TABLET TWICE A DAY AS NEEDED 30 tablet 2   • estradiol (ESTRACE VAGINAL) 0.1 MG/GM vaginal cream Insert 2 g into the vagina 2 (Two) Times a Week. 42.5 g 12     No facility-administered medications prior to visit.       Patient Active Problem List   Diagnosis   • Hx of colonic polyps   • Alkaline phosphatase elevation   • Acquired kyphosis   • Elevated cholesterol   • Gastroesophageal reflux disease without esophagitis   • Goiter, non-toxic   • Hypercalcemia   • Hyperheparinemia (HCC)   • Microscopic hematuria   • Mild tricuspid insufficiency   • Motion sickness   • Myxedema heart disease   • Neck pain   • Osteopenia   • Rheumatoid arthritis (HCC)   • Systolic murmur   • Benign hypertension   • Vitamin D deficiency   • TMJ crepitus   • Hypothyroidism   • Esophageal dysphagia       Advance Care Planning:  ACP discussion was held with the patient during this visit. Patient does not have an advance directive, information provided.    Identification of Risk Factors:  Risk factors include: Cardiovascular risk  Chronic Pain   Fall Risk  Immunizations Discussed/Encouraged (specific immunizations; Pneumococcal 23  and Shingrix )  Obesity/Overweight .    Review of Systems    Compared to one year ago, the patient feels her physical health is the same.  Compared to one year ago, the patient feels her mental health is the same.    Objective     Physical Exam  Vitals and nursing note reviewed.   Constitutional:       General: She is not in acute distress.     Appearance: Normal appearance. She is normal weight. She is not ill-appearing, toxic-appearing or diaphoretic.   HENT:      Head: Normocephalic and atraumatic.      Right Ear: Tympanic membrane, ear canal and external ear normal. There is no impacted cerumen.      Left Ear: Tympanic membrane, ear canal and external ear normal. There is no impacted cerumen.      Nose: Nose normal. No congestion or rhinorrhea.      Mouth/Throat:      Mouth: Mucous membranes are moist.      Pharynx: Oropharynx is clear. No oropharyngeal exudate or posterior oropharyngeal erythema.   Eyes:      General:         Right eye: No discharge.         Left eye: No discharge.      Extraocular Movements: Extraocular movements intact.      Conjunctiva/sclera: Conjunctivae normal.      Pupils: Pupils are equal, round, and reactive to light.   Neck:      Vascular: No carotid bruit.   Cardiovascular:      Rate and Rhythm: Normal rate and regular rhythm.      Pulses: Normal pulses.           Radial pulses are 2+ on the right side and 2+ on the left side.        Dorsalis pedis pulses are 2+ on the right side and 2+ on the left side.        Posterior tibial pulses are 2+ on the right side and 2+ on the left side.      Heart sounds: Murmur heard.    Systolic murmur is present with a grade of 1/6.    No friction rub. No gallop.   Pulmonary:      Effort: Pulmonary effort is normal. No respiratory distress.      Breath sounds: Normal breath sounds. No stridor. No wheezing or rhonchi.   Abdominal:      General: Abdomen is flat. Bowel sounds are normal. There is no distension.      Palpations: Abdomen is soft. There is  "no mass.      Tenderness: There is no abdominal tenderness. There is no guarding or rebound.      Hernia: No hernia is present.   Musculoskeletal:         General: No swelling, tenderness, deformity or signs of injury. Normal range of motion.      Cervical back: Normal range of motion and neck supple. No rigidity or tenderness.      Right lower leg: No edema.      Left lower leg: No edema.   Lymphadenopathy:      Cervical: No cervical adenopathy.   Skin:     General: Skin is warm and dry.      Capillary Refill: Capillary refill takes less than 2 seconds.      Coloration: Skin is not jaundiced or pale.      Findings: Lesion (left forearm, dark,scaly) present. No bruising, erythema or rash.   Neurological:      General: No focal deficit present.      Mental Status: She is alert and oriented to person, place, and time. Mental status is at baseline.      Sensory: No sensory deficit.      Motor: No weakness.      Coordination: Coordination normal.      Gait: Gait normal.   Psychiatric:         Mood and Affect: Mood normal.         Behavior: Behavior normal.         Thought Content: Thought content normal.         Judgment: Judgment normal.         Vitals:    05/19/22 1307   BP: 120/76   BP Location: Left arm   Patient Position: Sitting   Cuff Size: Adult   Pulse: 65   Temp: 97.1 °F (36.2 °C)   TempSrc: Temporal   SpO2: 98%   Weight: 68.9 kg (152 lb)   Height: 165.1 cm (65\")   PainSc: 0-No pain       BMI is >= 25 and < 30. (Overweight) The following options were offered after discussion: exercise counseling/recommendations and nutrition counseling/recommendations      Procedure   Procedures       Assessment & Plan   Patient Self-Management and Personalized Health Advice  The patient has been provided with information about: diet and exercise.    Visit Diagnoses:    ICD-10-CM ICD-9-CM   1. Encounter for annual wellness exam in Medicare patient  Z00.00 V70.0   2. Arthralgia of both hands  M25.541 719.44    M25.542    3. " Elevated cholesterol  E78.00 272.0   4. Systolic murmur  R01.1 785.2   5. Benign hypertension  I10 401.1   6. Acquired hypothyroidism  E03.9 244.9       No orders of the defined types were placed in this encounter.      Outpatient Encounter Medications as of 5/19/2022   Medication Sig Dispense Refill   • amLODIPine (NORVASC) 10 MG tablet Take 1 tablet by mouth Daily. 90 tablet 1   • Cholecalciferol (VITAMIN D3) 2000 UNITS capsule Take 2,000 Units by mouth Daily.     • famotidine (Pepcid) 40 MG tablet Take 1 tablet by mouth Daily. 90 tablet 3   • ibuprofen (ADVIL,MOTRIN) 800 MG tablet Take 1 tablet by mouth As Needed for Mild Pain . 90 tablet 3   • levothyroxine (SYNTHROID, LEVOTHROID) 100 MCG tablet Take 1 tablet by mouth Daily. 90 tablet 3   • spironolactone (ALDACTONE) 25 MG tablet TAKE (1) TABLET BY MOUTH DAILY 90 tablet 3   • valACYclovir (VALTREX) 1000 MG tablet TAKE (1) TABLET TWICE A DAY AS NEEDED 30 tablet 2   • Diclofenac Sodium (VOLTAREN) 1 % gel gel Apply 4 g topically to the appropriate area as directed 4 (Four) Times a Day As Needed (arthralgia). 140 g 2   • [DISCONTINUED] estradiol (ESTRACE VAGINAL) 0.1 MG/GM vaginal cream Insert 2 g into the vagina 2 (Two) Times a Week. 42.5 g 12     No facility-administered encounter medications on file as of 5/19/2022.       Reviewed use of high risk medication in the elderly: yes  Reviewed for potential of harmful drug interactions in the elderly: yes    Follow Up:  Return in about 6 months (around 11/19/2022) for Recheck cholesterol and bmp.     An After Visit Summary and PPPS with all of these plans were given to the patient.         Plan of care reviewed with Keshia.   We discussed her lab results.  Thyroid function is normal we will continue current therapy.  Blood pressure is well controlled we will continue current therapy  Murmur is grade 1 we will continue to monitor no symptoms of shortness of breath activity intolerance.  We discussed elevated LDL and diet  changes that could be made to help decrease this, including decreasing intake of heavily processed foods fast foods and fatty meats, increasing fruits and vegetables, lean proteins, and increasing activity as tolerated.  I advised that a omega-3 fish oil pill would also be okay.  We discussed elevated creatinine slightly elevated at 1.03 I advised not to overdo ibuprofen use.  I have sent in Voltaren gel to see if this will help with the arthralgia in her hands.  We discussed vaccines, including pneumococcal and Shingrix she would like to hold off on both so she was vaccinated for shingles in the past.   She is up-to-date on her mammogram just had it back on May 13 and it was normal.  She follows with Dr. Dianne Honeycutt as well as GYN related to her history of a lumpectomy and having a mother with a history of breast cancer.   We discussed wearing sunscreen.  I have advised her to get in contact with her dermatologist to have the lesion on her left forearm looked at.  It is new she cannot remember seeing it before.  She does spend quite a bit of time in the sun.   We will follow-up in 6 months to reassess her conditions.  Can follow-up prior to this as needed.

## 2022-07-07 RX ORDER — SPIRONOLACTONE 25 MG/1
25 TABLET ORAL DAILY
Qty: 90 TABLET | Refills: 3 | Status: SHIPPED | OUTPATIENT
Start: 2022-07-07

## 2022-07-11 DIAGNOSIS — Z12.31 SCREENING MAMMOGRAM FOR HIGH-RISK PATIENT: Primary | ICD-10-CM

## 2022-08-09 RX ORDER — AMLODIPINE BESYLATE 10 MG/1
TABLET ORAL
Qty: 90 TABLET | Refills: 0 | Status: SHIPPED | OUTPATIENT
Start: 2022-08-09 | End: 2023-02-07

## 2022-10-07 ENCOUNTER — HOSPITAL ENCOUNTER (OUTPATIENT)
Dept: GENERAL RADIOLOGY | Facility: HOSPITAL | Age: 67
Discharge: HOME | End: 2022-10-07
Admitting: NURSE PRACTITIONER

## 2022-10-07 PROCEDURE — 73562 X-RAY EXAM OF KNEE 3: CPT

## 2022-10-27 ENCOUNTER — LAB (OUTPATIENT)
Dept: INTERNAL MEDICINE | Facility: CLINIC | Age: 67
End: 2022-10-27

## 2022-10-27 DIAGNOSIS — E78.00 ELEVATED CHOLESTEROL: ICD-10-CM

## 2022-10-27 DIAGNOSIS — I10 BENIGN HYPERTENSION: ICD-10-CM

## 2022-10-28 LAB
ALBUMIN SERPL-MCNC: 4.9 G/DL (ref 3.5–5.2)
ALBUMIN/GLOB SERPL: 2.6 G/DL
ALP SERPL-CCNC: 143 U/L (ref 39–117)
ALT SERPL-CCNC: 16 U/L (ref 1–33)
AST SERPL-CCNC: 15 U/L (ref 1–32)
BILIRUB SERPL-MCNC: 0.6 MG/DL (ref 0–1.2)
BUN SERPL-MCNC: 15 MG/DL (ref 8–23)
BUN/CREAT SERPL: 15.3 (ref 7–25)
CALCIUM SERPL-MCNC: 9.8 MG/DL (ref 8.6–10.5)
CHLORIDE SERPL-SCNC: 105 MMOL/L (ref 98–107)
CHOLEST SERPL-MCNC: 245 MG/DL (ref 0–200)
CO2 SERPL-SCNC: 25.2 MMOL/L (ref 22–29)
CREAT SERPL-MCNC: 0.98 MG/DL (ref 0.57–1)
EGFRCR SERPLBLD CKD-EPI 2021: 62.6 ML/MIN/1.73
GLOBULIN SER CALC-MCNC: 1.9 GM/DL
GLUCOSE SERPL-MCNC: 89 MG/DL (ref 65–99)
HDLC SERPL-MCNC: 70 MG/DL (ref 40–60)
LDLC SERPL CALC-MCNC: 162 MG/DL (ref 0–100)
POTASSIUM SERPL-SCNC: 4.3 MMOL/L (ref 3.5–5.2)
PROT SERPL-MCNC: 6.8 G/DL (ref 6–8.5)
SODIUM SERPL-SCNC: 141 MMOL/L (ref 136–145)
TRIGL SERPL-MCNC: 76 MG/DL (ref 0–150)
VLDLC SERPL CALC-MCNC: 13 MG/DL (ref 5–40)

## 2022-10-28 NOTE — PROGRESS NOTES
Will discuss in upcoming visit  CMP good  Total cholesterol is high- trigs good. Consider adding lipitor

## 2022-11-01 ENCOUNTER — OFFICE VISIT (OUTPATIENT)
Dept: INTERNAL MEDICINE | Facility: CLINIC | Age: 67
End: 2022-11-01

## 2022-11-01 VITALS
OXYGEN SATURATION: 96 % | HEART RATE: 63 BPM | BODY MASS INDEX: 27 KG/M2 | HEIGHT: 63 IN | SYSTOLIC BLOOD PRESSURE: 148 MMHG | DIASTOLIC BLOOD PRESSURE: 88 MMHG | WEIGHT: 152.4 LBS

## 2022-11-01 DIAGNOSIS — E03.9 ACQUIRED HYPOTHYROIDISM: ICD-10-CM

## 2022-11-01 DIAGNOSIS — E78.00 ELEVATED CHOLESTEROL: Primary | ICD-10-CM

## 2022-11-01 DIAGNOSIS — I10 BENIGN HYPERTENSION: ICD-10-CM

## 2022-11-01 DIAGNOSIS — Z23 NEED FOR VACCINATION: ICD-10-CM

## 2022-11-01 DIAGNOSIS — K21.9 GASTROESOPHAGEAL REFLUX DISEASE WITHOUT ESOPHAGITIS: ICD-10-CM

## 2022-11-01 PROCEDURE — 90662 IIV NO PRSV INCREASED AG IM: CPT | Performed by: NURSE PRACTITIONER

## 2022-11-01 PROCEDURE — 99214 OFFICE O/P EST MOD 30 MIN: CPT | Performed by: NURSE PRACTITIONER

## 2022-11-01 PROCEDURE — G0008 ADMIN INFLUENZA VIRUS VAC: HCPCS | Performed by: NURSE PRACTITIONER

## 2022-11-01 RX ORDER — LISINOPRIL 10 MG/1
10 TABLET ORAL DAILY
Qty: 90 TABLET | Refills: 1 | Status: SHIPPED | OUTPATIENT
Start: 2022-11-01

## 2022-11-01 NOTE — ASSESSMENT & PLAN NOTE
Patient does not check her BP at home. On average, it has been elevated here >140/80. Will add lisinopril at this time. Education provided to the patient. Medication counseling.

## 2022-11-01 NOTE — PROGRESS NOTES
"Chief Complaint  Follow-up (Patient is here for follow-up over labs.), Hyperlipidemia, and Hypertension    Subjective        Keshia Fletcher presents to [facility]  History of Present Illness  Patient is here today to discuss labs and follow up on her Blood pressure and cholesterol.   Objective   Vital Signs:  /88 (BP Location: Left arm, Patient Position: Sitting, Cuff Size: Adult)   Pulse 63   Ht 160 cm (63\")   Wt 69.1 kg (152 lb 6.4 oz)   SpO2 96%   BMI 27.00 kg/m²   Estimated body mass index is 27 kg/m² as calculated from the following:    Height as of this encounter: 160 cm (63\").    Weight as of this encounter: 69.1 kg (152 lb 6.4 oz).    BMI is >= 25 and <30. (Overweight) The following options were offered after discussion;: weight loss educational material (shared in after visit summary), exercise counseling/recommendations and nutrition counseling/recommendations      Physical Exam  Vitals and nursing note reviewed.   Constitutional:       Appearance: Normal appearance. She is well-developed.   HENT:      Head: Normocephalic and atraumatic.      Right Ear: Tympanic membrane, ear canal and external ear normal.      Left Ear: Tympanic membrane, ear canal and external ear normal.      Nose: Nose normal. No septal deviation, nasal tenderness or congestion.      Mouth/Throat:      Lips: Pink. No lesions.      Mouth: Mucous membranes are moist. No oral lesions.      Dentition: Normal dentition.      Pharynx: Oropharynx is clear. No pharyngeal swelling, oropharyngeal exudate or posterior oropharyngeal erythema.   Eyes:      General: Lids are normal. Vision grossly intact. No scleral icterus.        Right eye: No discharge.         Left eye: No discharge.      Extraocular Movements: Extraocular movements intact.      Conjunctiva/sclera: Conjunctivae normal.      Right eye: Right conjunctiva is not injected.      Left eye: Left conjunctiva is not injected.      Pupils: Pupils " are equal, round, and reactive to light.   Neck:      Thyroid: No thyroid mass.      Trachea: Trachea normal.   Cardiovascular:      Rate and Rhythm: Normal rate and regular rhythm.      Heart sounds: Normal heart sounds. No murmur heard.    No gallop.   Pulmonary:      Effort: Pulmonary effort is normal.      Breath sounds: Normal breath sounds and air entry. No wheezing, rhonchi or rales.   Musculoskeletal:         General: No tenderness or deformity. Normal range of motion.      Cervical back: Full passive range of motion without pain, normal range of motion and neck supple.      Thoracic back: Normal.      Right lower leg: No edema.      Left lower leg: No edema.   Skin:     General: Skin is warm and dry.      Coloration: Skin is not jaundiced.      Findings: No rash.   Neurological:      Mental Status: She is alert and oriented to person, place, and time.      Cranial Nerves: Cranial nerves are intact.      Sensory: Sensation is intact.      Motor: Motor function is intact.      Coordination: Coordination is intact.      Gait: Gait is intact.      Deep Tendon Reflexes: Reflexes are normal and symmetric.   Psychiatric:         Mood and Affect: Mood and affect normal.         Behavior: Behavior normal.         Judgment: Judgment normal.        Result Review :  The following data was reviewed by: ELAYNE Chavez on 11/01/2022:  Common labs    Common Labs 11/17/21 5/13/22 5/13/22 5/13/22 5/13/22 10/27/22 10/27/22     0703 [id number] 0703 0703 0719 0719   Glucose 91 95    89    BUN 15 12    15    Creatinine 0.95 1.03 (A)    0.98    eGFR Non African Am 58 (A)         eGFR African Am 71         Sodium 139 139    141    Potassium 4.3 4.2    4.3    Chloride 103 104    105    Calcium 9.9 9.8    9.8    Total Protein  7.3    6.8    Albumin  4.60    4.90    Total Bilirubin  0.6    0.6    Alkaline Phosphatase  140 (A)    143 (A)    AST (SGOT)  19    15    ALT (SGPT)  21    16    WBC    4.94      Hemoglobin    14.8       Hematocrit    45.5      Platelets    148      Total Cholesterol   228 (A)    245 (A)   Triglycerides   102    76   HDL Cholesterol   57    70 (A)   LDL Cholesterol    153 (A)    162 (A)   Uric Acid     4.9     (A) Abnormal value       Comments are available for some flowsheets but are not being displayed.                     Assessment and Plan   Diagnoses and all orders for this visit:    1. Elevated cholesterol (Primary)  Assessment & Plan:  Total cholesterol is elevated at 245. Discussed starting a statin, but she wants to work on diet at this time. Will take an OTC  Fish oil also. Recheck labs in 6 months with physical.       2. Benign hypertension  Assessment & Plan:  Patient does not check her BP at home. On average, it has been elevated here >140/80. Will add lisinopril at this time. Education provided to the patient. Medication counseling.     Orders:  -     lisinopril (PRINIVIL,ZESTRIL) 10 MG tablet; Take 1 tablet by mouth Daily.  Dispense: 90 tablet; Refill: 1    3. Acquired hypothyroidism    4. Gastroesophageal reflux disease without esophagitis    Plan  1. Add on lisinopril 10mg for BP control  2. Work on diet and start fish oil for cholesterol. Recheck labs in 6 months, if not improved will have to add a statin       Follow Up   Return in about 6 months (around 5/1/2023) for Annual physical.  Patient was given instructions and counseling regarding her condition or for health maintenance advice. Please see specific information pulled into the AVS if appropriate.       Answers for HPI/ROS submitted by the patient on 10/31/2022  What is the primary reason for your visit?: Physical

## 2022-11-01 NOTE — ASSESSMENT & PLAN NOTE
Total cholesterol is elevated at 245. Discussed starting a statin, but she wants to work on diet at this time. Will take an OTC  Fish oil also. Recheck labs in 6 months with physical.

## 2022-11-08 RX ORDER — FAMOTIDINE 40 MG/1
40 TABLET, FILM COATED ORAL DAILY
Qty: 90 TABLET | Refills: 3 | Status: SHIPPED | OUTPATIENT
Start: 2022-11-08

## 2022-11-08 NOTE — TELEPHONE ENCOUNTER
Caller: JosseKeshia landaverde    Relationship: Self    Best call back number:[phone]    Requested Prescriptions:   Requested Prescriptions     Pending Prescriptions Disp Refills   • famotidine (Pepcid) 40 MG tablet 90 tablet 3     Sig: Take 1 tablet by mouth Daily.        Pharmacy where request should be sent: [facility] [address] [phone] [facility] [phone] FX         Does the patient have less than a 3 day supply:  [] Yes  [x] No    Bailey Felder Rep   11/08/22 11:03 CST

## 2023-01-06 RX ORDER — SPIRONOLACTONE 25 MG/1
TABLET ORAL
Qty: 90 TABLET | Refills: 0 | OUTPATIENT
Start: 2023-01-06

## 2023-01-17 ENCOUNTER — OFFICE VISIT (OUTPATIENT)
Dept: OBSTETRICS AND GYNECOLOGY | Facility: CLINIC | Age: 68
End: 2023-01-17
Payer: MEDICARE

## 2023-01-17 VITALS
SYSTOLIC BLOOD PRESSURE: 116 MMHG | BODY MASS INDEX: 26.75 KG/M2 | DIASTOLIC BLOOD PRESSURE: 82 MMHG | WEIGHT: 151 LBS | HEIGHT: 63 IN

## 2023-01-17 DIAGNOSIS — N89.8 VAGINAL DRYNESS: ICD-10-CM

## 2023-01-17 DIAGNOSIS — N39.3 SUI (STRESS URINARY INCONTINENCE, FEMALE): ICD-10-CM

## 2023-01-17 DIAGNOSIS — Z78.0 MENOPAUSE: ICD-10-CM

## 2023-01-17 DIAGNOSIS — Z86.010 HX OF COLONIC POLYPS: Primary | ICD-10-CM

## 2023-01-17 DIAGNOSIS — Z01.419 ENCOUNTER FOR GYNECOLOGICAL EXAMINATION WITHOUT ABNORMAL FINDING: ICD-10-CM

## 2023-01-17 DIAGNOSIS — M85.80 OSTEOPENIA, UNSPECIFIED LOCATION: ICD-10-CM

## 2023-01-17 DIAGNOSIS — N94.10 DYSPAREUNIA IN FEMALE: ICD-10-CM

## 2023-01-17 PROCEDURE — G0101 CA SCREEN;PELVIC/BREAST EXAM: HCPCS | Performed by: OBSTETRICS & GYNECOLOGY

## 2023-01-17 RX ORDER — CONJUGATED ESTROGENS 0.62 MG/G
CREAM VAGINAL
Qty: 30 G | Refills: 5 | Status: SHIPPED | OUTPATIENT
Start: 2023-01-17

## 2023-02-07 RX ORDER — AMLODIPINE BESYLATE 10 MG/1
TABLET ORAL
Qty: 90 TABLET | Refills: 3 | Status: SHIPPED | OUTPATIENT
Start: 2023-02-07

## 2023-04-07 RX ORDER — IBUPROFEN 800 MG/1
800 TABLET ORAL AS NEEDED
Qty: 90 TABLET | Refills: 3 | Status: SHIPPED | OUTPATIENT
Start: 2023-04-07

## 2023-04-07 NOTE — TELEPHONE ENCOUNTER
Caller: Keshia Fletcher    Relationship: Self    Best call back number: [phone]    Requested Prescriptions:   Requested Prescriptions     Pending Prescriptions Disp Refills   • ibuprofen (ADVIL,MOTRIN) 800 MG tablet 90 tablet 3     Sig: Take 1 tablet by mouth As Needed for Mild Pain.        Pharmacy where request should be sent: [address] [phone] [facility] [phone] FX     Last office visit with prescribing clinician: Visit date not found   Last telemedicine visit with prescribing clinician: 5/15/2023   Next office visit with prescribing clinician: 5/22/2023     Additional details provided by patient: HAS 1 LEFT      Does the patient have less than a 3 day supply:  [x] Yes  [] No    Would you like a call back once the refill request has been completed: [] Yes [x] No    If the office needs to give you a call back, can they leave a voicemail: [] Yes [x] No    Bailey Thomson Rep   04/07/23 13:22 CDT

## 2023-05-01 RX ORDER — VALACYCLOVIR HYDROCHLORIDE 1 G/1
1000 TABLET, FILM COATED ORAL 2 TIMES DAILY
Qty: 30 TABLET | Refills: 2 | Status: SHIPPED | OUTPATIENT
Start: 2023-05-01

## 2023-05-01 NOTE — TELEPHONE ENCOUNTER
Caller: Keshia Fletcher    Relationship: Self    Best call back number: [phone]    Requested Prescriptions:   Requested Prescriptions     Pending Prescriptions Disp Refills   • valACYclovir (VALTREX) 1000 MG tablet 30 tablet 2        Pharmacy where request should be sent: [facility] [address] [phone] [facility] [phone]      Last office visit with prescribing clinician: Visit date not found   Last telemedicine visit with prescribing clinician: 5/15/2023   Next office visit with prescribing clinician: 5/22/2023     Additional details provided by patient:   PATIENT IS RUNNING LOW ON MEDICATION     Does the patient have less than a 3 day supply:  [x] Yes  [] No    Would you like a call back once the refill request has been completed: [x] Yes [] No    If the office needs to give you a call back, can they leave a voicemail: [x] Yes [] No    Naz Espana, PCT   05/01/23 10:33 CDT

## 2023-05-03 DIAGNOSIS — I10 BENIGN HYPERTENSION: ICD-10-CM

## 2023-05-04 RX ORDER — LEVOTHYROXINE SODIUM 0.1 MG/1
TABLET ORAL
Qty: 90 TABLET | Refills: 3 | Status: SHIPPED | OUTPATIENT
Start: 2023-05-04

## 2023-05-04 RX ORDER — LISINOPRIL 10 MG/1
TABLET ORAL
Qty: 90 TABLET | Refills: 0 | Status: SHIPPED | OUTPATIENT
Start: 2023-05-04

## 2023-05-04 NOTE — TELEPHONE ENCOUNTER
Rx Refill Note  Requested Prescriptions     Pending Prescriptions Disp Refills   • lisinopril (PRINIVIL,ZESTRIL) 10 MG tablet [Pharmacy Med Name: LISINOPRIL 10 MG TABLET] 90 tablet 0     Sig: TAKE ONE TABLET BY MOUTH DAILY      Last office visit with prescribing clinician: 11/1/2022   Last telemedicine visit with prescribing clinician:   Next office visit with prescribing clinician: 05/22/2023                          Braulio Johansen MA  05/04/23, 08:38 CDT

## 2023-05-13 NOTE — TELEPHONE ENCOUNTER
"  ED Provider Note    CHIEF COMPLAINT  Chief Complaint   Patient presents with    Blood in Urine     Pt reports using restroom tonight and had a couple drops of blood. Denies painful urination.     EXTERNAL RECORDS REVIEWED  Review of records show that the patient was last seen at Urgent Care in October of 2021 for URI symptoms.     HPI/ROS  LIMITATION TO HISTORY   Select: : None  OUTSIDE HISTORIAN(S):  None    Ryan Jack is a 19 y.o. male who presents to the Emergency Department with blood in his urine onset one week ago. The patient states that this has happened three times throughout the week. He describes that he will fully urinate, then at the end notice a few drops of blood. He states that it is not painful, however will slightly burn. He denies any abdominal or back pain that may be related to this. He denies any fever, nausea, vomiting, or recent trauma. He denies any possibility for sexually transmitted infections, or a history of kidney stones. There are no known alleviating or exacerbating factors.      PAST MEDICAL HISTORY  History reviewed. No pertinent past medical history.     SURGICAL HISTORY  History reviewed. No pertinent surgical history.     FAMILY HISTORY  History reviewed. No pertinent family history.    SOCIAL HISTORY   reports that he has quit smoking. His smoking use included cigarettes. He has never used smokeless tobacco. He reports current alcohol use. He reports current drug use. Drug: Inhaled.    CURRENT MEDICATIONS  Previous Medications    None noted.     ALLERGIES  Patient has no known allergies.    PHYSICAL EXAM  BP (!) 143/78   Pulse 71   Temp 36.1 °C (97 °F) (Temporal)   Resp 18   Ht 1.727 m (5' 8\")   Wt 94.5 kg (208 lb 5.4 oz)   SpO2 98%      Constitutional: Nontoxic appearing. Alert in no apparent distress.  HENT: Normocephalic, Atraumatic. Bilateral external ears normal. Nose normal.  Moist mucous membranes.  Oropharynx clear.  Eyes: Pupils are equal and " Caller: Keshia Fletcher SARITA    Relationship: Self    Best call back number: [phone]    Requested Prescriptions:   Requested Prescriptions     Pending Prescriptions Disp Refills   • spironolactone (ALDACTONE) 25 MG tablet 90 tablet 3     Sig: Take 1 tablet by mouth Daily.        Pharmacy where request should be sent: [facility] [phone] [facility] [phone] FX     Does the patient have less than a 3 day supply:  [x] Yes  [] No    Bailey Ding Rep   07/07/22 08:15 CDT            reactive. Conjunctiva normal.   Neck: Supple, full range of motion  Heart: Regular rate and rhythm.  No murmurs.    Lungs: No respiratory distress, normal work of breathing. Lungs clear to auscultation bilaterally.  Abdomen Soft, no distention.  No tenderness to palpation.  Musculoskeletal: Atraumatic. No obvious deformities noted.  No lower extremity edema.  Skin: Warm, Dry.  No erythema, No rash.   Neurologic: Alert and oriented x3. Moving all extremities spontaneously without focal deficits.  Psychiatric: Affect normal, Mood normal, Appears appropriate and not intoxicated.      DIAGNOSTIC STUDIES / PROCEDURES    LABS  Labs Reviewed   URINALYSIS,CULTURE IF INDICATED - Abnormal; Notable for the following components:       Result Value    Leukocyte Esterase Moderate (*)     Occult Blood Trace (*)     All other components within normal limits    Narrative:     Indication for culture:->Patient WITHOUT an indwelling Smith  catheter in place with new onset of Dysuria, Frequency,  Urgency, and/or Suprapubic pain   URINE MICROSCOPIC (W/UA) - Abnormal; Notable for the following components:    WBC Packed (*)     RBC 2-5 (*)     Bacteria Rare (*)     All other components within normal limits    Narrative:     Indication for culture:->Patient WITHOUT an indwelling Smith  catheter in place with new onset of Dysuria, Frequency,  Urgency, and/or Suprapubic pain   URINE CULTURE(NEW)    Narrative:     Indication for culture:->Patient WITHOUT an indwelling Smith  catheter in place with new onset of Dysuria, Frequency,  Urgency, and/or Suprapubic pain      COURSE & MEDICAL DECISION MAKING  8:56 PM - Patient seen and examined at bedside. Discussed plan of care, including urinalysis and reassessment. Patient agrees to the plan of care. Ordered for Urinalysis, Culture if Indicated to evaluate his symptoms.      ED Observation Status? No    INITIAL ASSESSMENT, COURSE AND PLAN  Care Narrative: Patient presents with intermittent blood in  the urine for the last week.  He has normal vitals on arrival and is otherwise asymptomatic.  Abdominal exam benign without concern for pyelonephritis or nephrolithiasis.  No history of trauma.  Zero concern for STI.  UA does show evidence of infection.    10:06 PM - Upon reassessment, patient is resting comfortably with normal vital signs.  No new complaints at this time.  Discussed results with patient and/or family as well as importance of primary care follow up.  Patient understands plan of care and strict return precautions for new or changing symptoms.     ADDITIONAL PROBLEM LIST  Problem #1: Acute cystitis with hematuria - discharge with nitrofurantoin, will need follow up with urology if bleeding does not improve following this        DISPOSITION AND DISCUSSIONS  Escalation of care considered, and ultimately not performed:IV fluids, blood analysis, and diagnostic imaging    Barriers to care at this time, including but not limited to: Patient does not have established PCP.     Decision tools and prescription drugs considered including, but not limited to: Pain Medications over the counter medications should be sufficient .    DISPOSITION:  Patient will be discharged home in stable condition.    FOLLOW UP:  [facility]  [address] [address] [phone]  Call   to establish primary care physician    [facility], Emergency Dept  [address] [address] [phone]    If symptoms worsen    OUTPATIENT MEDICATIONS:  New Prescriptions    NITROFURANTOIN (MACROBID) 100 MG CAP    Take 1 Capsule by mouth 2 times a day for 7 days.      FINAL DIAGNOSIS  1. Acute cystitis with hematuria        The note accurately reflects work and decisions made by me.  Felicity Henry M.D.  5/13/2023  11:12 AM     Erin ZHANG (Aishwarya), am scribing for, and in the presence of, Felicity Henry M.D..    Electronically signed by: Erin Alatorre),  5/12/2023    Felicity ZHANG M.D. personally performed the services described in this documentation, as scribed by Erin Boyce in my presence, and it is both accurate and complete.

## 2023-05-15 ENCOUNTER — LAB (OUTPATIENT)
Dept: INTERNAL MEDICINE | Facility: CLINIC | Age: 68
End: 2023-05-15
Payer: MEDICARE

## 2023-05-15 DIAGNOSIS — E55.9 VITAMIN D DEFICIENCY: ICD-10-CM

## 2023-05-15 DIAGNOSIS — E78.00 ELEVATED CHOLESTEROL: Primary | ICD-10-CM

## 2023-05-15 DIAGNOSIS — M85.80 OSTEOPENIA, UNSPECIFIED LOCATION: ICD-10-CM

## 2023-05-15 DIAGNOSIS — I10 BENIGN HYPERTENSION: ICD-10-CM

## 2023-05-15 DIAGNOSIS — E03.9 ACQUIRED HYPOTHYROIDISM: ICD-10-CM

## 2023-05-16 LAB
25(OH)D3+25(OH)D2 SERPL-MCNC: 57.3 NG/ML (ref 30–100)
ALBUMIN SERPL-MCNC: 4.8 G/DL (ref 3.5–5.2)
ALBUMIN/GLOB SERPL: 2.3 G/DL
ALP SERPL-CCNC: 125 U/L (ref 39–117)
ALT SERPL-CCNC: 18 U/L (ref 1–33)
APPEARANCE UR: ABNORMAL
AST SERPL-CCNC: 19 U/L (ref 1–32)
BACTERIA #/AREA URNS HPF: ABNORMAL /HPF
BASOPHILS # BLD AUTO: 0.05 10*3/MM3 (ref 0–0.2)
BASOPHILS NFR BLD AUTO: 1 % (ref 0–1.5)
BILIRUB SERPL-MCNC: 0.6 MG/DL (ref 0–1.2)
BILIRUB UR QL STRIP: NEGATIVE
BUN SERPL-MCNC: 12 MG/DL (ref 8–23)
BUN/CREAT SERPL: 13.8 (ref 7–25)
CALCIUM SERPL-MCNC: 9.7 MG/DL (ref 8.6–10.5)
CASTS URNS MICRO: ABNORMAL
CHLORIDE SERPL-SCNC: 105 MMOL/L (ref 98–107)
CHOLEST SERPL-MCNC: 214 MG/DL (ref 0–200)
CO2 SERPL-SCNC: 25 MMOL/L (ref 22–29)
COLOR UR: YELLOW
CREAT SERPL-MCNC: 0.87 MG/DL (ref 0.57–1)
CRYSTALS URNS MICRO: ABNORMAL
EGFRCR SERPLBLD CKD-EPI 2021: 71.8 ML/MIN/1.73
EOSINOPHIL # BLD AUTO: 0.09 10*3/MM3 (ref 0–0.4)
EOSINOPHIL NFR BLD AUTO: 1.9 % (ref 0.3–6.2)
EPI CELLS #/AREA URNS HPF: ABNORMAL /HPF
ERYTHROCYTE [DISTWIDTH] IN BLOOD BY AUTOMATED COUNT: 12.4 % (ref 12.3–15.4)
GLOBULIN SER CALC-MCNC: 2.1 GM/DL
GLUCOSE SERPL-MCNC: 89 MG/DL (ref 65–99)
GLUCOSE UR QL STRIP: NEGATIVE
HCT VFR BLD AUTO: 42.8 % (ref 34–46.6)
HDLC SERPL-MCNC: 54 MG/DL (ref 40–60)
HGB BLD-MCNC: 14.3 G/DL (ref 12–15.9)
HGB UR QL STRIP: NEGATIVE
IMM GRANULOCYTES # BLD AUTO: 0.02 10*3/MM3 (ref 0–0.05)
IMM GRANULOCYTES NFR BLD AUTO: 0.4 % (ref 0–0.5)
KETONES UR QL STRIP: NEGATIVE
LDLC SERPL CALC-MCNC: 135 MG/DL (ref 0–100)
LEUKOCYTE ESTERASE UR QL STRIP: ABNORMAL
LYMPHOCYTES # BLD AUTO: 1.45 10*3/MM3 (ref 0.7–3.1)
LYMPHOCYTES NFR BLD AUTO: 30.3 % (ref 19.6–45.3)
MCH RBC QN AUTO: 31 PG (ref 26.6–33)
MCHC RBC AUTO-ENTMCNC: 33.4 G/DL (ref 31.5–35.7)
MCV RBC AUTO: 92.6 FL (ref 79–97)
MONOCYTES # BLD AUTO: 0.37 10*3/MM3 (ref 0.1–0.9)
MONOCYTES NFR BLD AUTO: 7.7 % (ref 5–12)
NEUTROPHILS # BLD AUTO: 2.81 10*3/MM3 (ref 1.7–7)
NEUTROPHILS NFR BLD AUTO: 58.7 % (ref 42.7–76)
NITRITE UR QL STRIP: NEGATIVE
NRBC BLD AUTO-RTO: 0 /100 WBC (ref 0–0.2)
PH UR STRIP: 6.5 [PH] (ref 5–8)
PLATELET # BLD AUTO: 142 10*3/MM3 (ref 140–450)
POTASSIUM SERPL-SCNC: 4.3 MMOL/L (ref 3.5–5.2)
PROT SERPL-MCNC: 6.9 G/DL (ref 6–8.5)
PROT UR QL STRIP: NEGATIVE
RBC # BLD AUTO: 4.62 10*6/MM3 (ref 3.77–5.28)
RBC #/AREA URNS HPF: ABNORMAL /HPF
SODIUM SERPL-SCNC: 140 MMOL/L (ref 136–145)
SP GR UR STRIP: 1.01 (ref 1–1.03)
TRIGL SERPL-MCNC: 141 MG/DL (ref 0–150)
TSH SERPL DL<=0.005 MIU/L-ACNC: 1.35 UIU/ML (ref 0.27–4.2)
UROBILINOGEN UR STRIP-MCNC: ABNORMAL MG/DL
VLDLC SERPL CALC-MCNC: 25 MG/DL (ref 5–40)
WBC # BLD AUTO: 4.79 10*3/MM3 (ref 3.4–10.8)
WBC #/AREA URNS HPF: ABNORMAL /HPF
YEAST #/AREA URNS HPF: ABNORMAL /HPF

## 2023-05-22 ENCOUNTER — OFFICE VISIT (OUTPATIENT)
Dept: INTERNAL MEDICINE | Facility: CLINIC | Age: 68
End: 2023-05-22
Payer: MEDICARE

## 2023-05-22 VITALS
HEART RATE: 59 BPM | WEIGHT: 157 LBS | SYSTOLIC BLOOD PRESSURE: 130 MMHG | HEIGHT: 63 IN | OXYGEN SATURATION: 98 % | DIASTOLIC BLOOD PRESSURE: 78 MMHG | BODY MASS INDEX: 27.82 KG/M2 | TEMPERATURE: 97.6 F

## 2023-05-22 DIAGNOSIS — Z78.0 MENOPAUSE: ICD-10-CM

## 2023-05-22 DIAGNOSIS — E03.9 ACQUIRED HYPOTHYROIDISM: ICD-10-CM

## 2023-05-22 DIAGNOSIS — E78.00 PURE HYPERCHOLESTEROLEMIA: Primary | ICD-10-CM

## 2023-05-22 DIAGNOSIS — M85.80 OSTEOPENIA, UNSPECIFIED LOCATION: ICD-10-CM

## 2023-05-22 DIAGNOSIS — I07.1 MILD TRICUSPID INSUFFICIENCY: ICD-10-CM

## 2023-05-22 DIAGNOSIS — I10 BENIGN HYPERTENSION: ICD-10-CM

## 2023-05-22 DIAGNOSIS — E66.3 OVERWEIGHT (BMI 25.0-29.9): ICD-10-CM

## 2023-05-22 DIAGNOSIS — Z23 NEED FOR PNEUMOCOCCAL 20-VALENT CONJUGATE VACCINATION: ICD-10-CM

## 2023-05-22 DIAGNOSIS — E55.9 VITAMIN D DEFICIENCY: ICD-10-CM

## 2023-05-22 DIAGNOSIS — R74.8 ALKALINE PHOSPHATASE ELEVATION: ICD-10-CM

## 2023-05-22 DIAGNOSIS — R42 DIZZINESS: ICD-10-CM

## 2023-05-22 DIAGNOSIS — K21.9 GASTROESOPHAGEAL REFLUX DISEASE WITHOUT ESOPHAGITIS: ICD-10-CM

## 2023-05-22 DIAGNOSIS — E04.9 GOITER, NON-TOXIC: ICD-10-CM

## 2023-05-22 PROBLEM — R13.19 ESOPHAGEAL DYSPHAGIA: Status: RESOLVED | Noted: 2021-08-17 | Resolved: 2023-05-22

## 2023-05-22 PROBLEM — T75.3XXA MOTION SICKNESS: Status: RESOLVED | Noted: 2020-03-19 | Resolved: 2023-05-22

## 2023-05-22 PROBLEM — D68.32: Status: RESOLVED | Noted: 2020-03-19 | Resolved: 2023-05-22

## 2023-05-22 PROBLEM — Z86.010 HX OF COLONIC POLYPS: Status: RESOLVED | Noted: 2020-03-02 | Resolved: 2023-05-22

## 2023-05-22 PROBLEM — Z86.0100 HX OF COLONIC POLYPS: Status: RESOLVED | Noted: 2020-03-02 | Resolved: 2023-05-22

## 2023-05-22 PROBLEM — E83.52 HYPERCALCEMIA: Status: RESOLVED | Noted: 2020-03-19 | Resolved: 2023-05-22

## 2023-05-22 PROBLEM — R31.29 MICROSCOPIC HEMATURIA: Status: RESOLVED | Noted: 2020-03-19 | Resolved: 2023-05-22

## 2023-05-22 PROBLEM — M54.2 NECK PAIN: Status: RESOLVED | Noted: 2020-03-19 | Resolved: 2023-05-22

## 2023-05-22 RX ORDER — ATORVASTATIN CALCIUM 20 MG/1
20 TABLET, FILM COATED ORAL NIGHTLY
Qty: 90 TABLET | Refills: 2 | Status: SHIPPED | OUTPATIENT
Start: 2023-05-22

## 2023-05-22 NOTE — PROGRESS NOTES
The ABCs of the Annual Wellness Visit  Subsequent Medicare Wellness Visit    Chief Complaint   Patient presents with    Medicare Wellness-subsequent      Subjective    History of Present Illness:  Keshia Fletcher is a 68 y.o. female who presents for a Subsequent Medicare Wellness Visit.    The following portions of the patient's history were reviewed and   updated as appropriate: allergies, current medications, past family history, past medical history, past social history, past surgical history and problem list.    Compared to one year ago, the patient feels her physical   health is the same.    Compared to one year ago, the patient feels her mental   health is the same.    Recent Hospitalizations:  She was not admitted to the hospital during the last year.       Current Medical Providers:  Patient Care Team:  Kervin Monique MD as PCP - General (Internal Medicine)  Tomas Ervin MD as Consulting Physician (Gastroenterology)    Outpatient Medications Prior to Visit   Medication Sig Dispense Refill    amLODIPine (NORVASC) 10 MG tablet TAKE ONE TABLET BY MOUTH DAILY 90 tablet 3    Cholecalciferol (VITAMIN D3) 2000 UNITS capsule Take 1 capsule by mouth Daily.      Diclofenac Sodium (VOLTAREN) 1 % gel gel Apply 4 g topically to the appropriate area as directed 4 (Four) Times a Day As Needed (arthralgia). 140 g 2    Estrogens Conjugated (Premarin) 0.625 MG/GM vaginal cream 1/2 gram, twice weekly at bedtime 30 g 5    famotidine (Pepcid) 40 MG tablet Take 1 tablet by mouth Daily. 90 tablet 3    ibuprofen (ADVIL,MOTRIN) 800 MG tablet Take 1 tablet by mouth As Needed for Mild Pain. 90 tablet 3    levothyroxine (SYNTHROID, LEVOTHROID) 100 MCG tablet TAKE 1 TABLET DAILY. 90 tablet 3    lisinopril (PRINIVIL,ZESTRIL) 10 MG tablet TAKE ONE TABLET BY MOUTH DAILY 90 tablet 0    spironolactone (ALDACTONE) 25 MG tablet Take 1 tablet by mouth Daily. 90 tablet 3    valACYclovir (VALTREX) 1000 MG tablet Take 1 tablet by mouth  "2 (Two) Times a Day. 30 tablet 2     No facility-administered medications prior to visit.       No opioid medication identified on active medication list. I have reviewed chart for other potential  high risk medication/s and harmful drug interactions in the elderly.        Aspirin is not on active medication list.  Aspirin use is not indicated based on review of current medical condition/s. Risk of harm outweighs potential benefits.  .    Patient Active Problem List   Diagnosis    Alkaline phosphatase elevation    Acquired kyphosis    Pure hypercholesterolemia    Gastroesophageal reflux disease without esophagitis    Goiter, non-toxic    Mild tricuspid insufficiency    Myxedema heart disease    Osteopenia    Rheumatoid arthritis    Systolic murmur    Benign hypertension    Vitamin D deficiency    TMJ crepitus    Hypothyroidism    Overweight (BMI 25.0-29.9)    Dizziness     Advance Care Planning  Advance Directive is not on file.  ACP discussion was held with the patient during this visit. Patient does not have an advance directive, information provided.       Objective    Vitals:    05/22/23 0826   BP: 130/78   BP Location: Left arm   Patient Position: Sitting   Cuff Size: Adult   Pulse: 59   Temp: 97.6 °F (36.4 °C)   TempSrc: Temporal   SpO2: 98%   Weight: 71.2 kg (157 lb)   Height: 160 cm (63\")   PainSc:   3   PainLoc: Knee  Comment: arthritis     Estimated body mass index is 27.81 kg/m² as calculated from the following:    Height as of this encounter: 160 cm (63\").    Weight as of this encounter: 71.2 kg (157 lb).    BMI is >= 25 and <30. (Overweight) The following options were offered after discussion;: exercise counseling/recommendations and nutrition counseling/recommendations      Does the patient have evidence of cognitive impairment? No      Lab Results   Component Value Date    CHLPL 214 (H) 05/15/2023    TRIG 141 05/15/2023    HDL 54 05/15/2023     (H) 05/15/2023    VLDL 25 05/15/2023      "       HEALTH RISK ASSESSMENT    Smoking Status:  Social History     Tobacco Use   Smoking Status Never   Smokeless Tobacco Never     Alcohol Consumption:  Social History     Substance and Sexual Activity   Alcohol Use Yes    Alcohol/week: 1.0 standard drink    Types: 1 Drinks containing 0.5 oz of alcohol per week    Comment: rare     Fall Risk Screen:    MIHIR Fall Risk Assessment was completed, and patient is at LOW risk for falls.Assessment completed on:2023    Depression Screenin/22/2023     8:25 AM   PHQ-2/PHQ-9 Depression Screening   Little Interest or Pleasure in Doing Things 0-->not at all   Feeling Down, Depressed or Hopeless 0-->not at all   PHQ-9: Brief Depression Severity Measure Score 0       Health Habits and Functional and Cognitive Screenin/22/2023     8:25 AM   Functional & Cognitive Status   Do you have difficulty preparing food and eating? No   Do you have difficulty bathing yourself, getting dressed or grooming yourself? No   Do you have difficulty using the toilet? No   Do you have difficulty moving around from place to place? No   Do you have trouble with steps or getting out of a bed or a chair? No   Current Diet Well Balanced Diet   Dental Exam Up to date   Eye Exam Up to date   Exercise (times per week) 6 times per week   Current Exercises Include Walking   Do you need help using the phone?  No   Are you deaf or do you have serious difficulty hearing?  No   Do you need help with transportation? No   Do you need help shopping? No   Do you need help preparing meals?  No   Do you need help with housework?  No   Do you need help with laundry? No   Do you need help taking your medications? No   Do you need help managing money? No   Do you ever drive or ride in a car without wearing a seat belt? No   Have you felt unusual stress, anger or loneliness in the last month? No   Who do you live with? Spouse   If you need help, do you have trouble finding someone available to you?  No   Have you been bothered in the last four weeks by sexual problems? No   Do you have difficulty concentrating, remembering or making decisions? No       Age-appropriate Screening Schedule:  Refer to the list below for future screening recommendations based on patient's age, sex and/or medical conditions. Orders for these recommended tests are listed in the plan section. The patient has been provided with a written plan.    Health Maintenance   Topic Date Due    ZOSTER VACCINE (1 of 2) Never done    PAP SMEAR  02/13/2022    COVID-19 Vaccine (4 - Booster for Moderna series) 03/29/2022    MAMMOGRAM  05/11/2023    DXA SCAN  05/11/2023    INFLUENZA VACCINE  08/01/2023    COLORECTAL CANCER SCREENING  03/31/2024    LIPID PANEL  05/15/2024    ANNUAL WELLNESS VISIT  05/22/2024    TDAP/TD VACCINES (2 - Td or Tdap) 03/13/2027    HEPATITIS C SCREENING  Completed    Pneumococcal Vaccine 65+  Completed              Assessment & Plan   CMS Preventative Services Quick Reference  Risk Factors Identified During Encounter  Immunizations Discussed/Encouraged: Prevnar 20 (Pneumococcal 20-valent conjugate) and Shingrix  The above risks/problems have been discussed with the patient.  Follow up actions/plans if indicated are seen below in the Assessment/Plan Section.  Pertinent information has been shared with the patient in the After Visit Summary.    Diagnoses and all orders for this visit:    1. Pure hypercholesterolemia (Primary)  -     atorvastatin (LIPITOR) 20 MG tablet; Take 1 tablet by mouth Every Night.  Dispense: 90 tablet; Refill: 2  -     Comprehensive metabolic panel; Future  -     Lipid panel; Future    2. Benign hypertension    3. Acquired hypothyroidism    4. Mild tricuspid insufficiency    5. Goiter, non-toxic    6. Gastroesophageal reflux disease without esophagitis    7. Alkaline phosphatase elevation    8. Overweight (BMI 25.0-29.9)    9. Vitamin D deficiency    10. Dizziness    11. Need for pneumococcal 20-valent  "conjugate vaccination  -     Pneumococcal Conjugate Vaccine 20-Valent All      Patient educated on shingrix and TDAP.  Patient thinks shes up to date on COVID.      Patients last pap smear in 2019 showed ASCUS.  Patient follows with OBGYN.  Will defer to them.      Result Review :           Follow Up:   Return in about 6 months (around 11/22/2023), or if symptoms worsen or fail to improve, for Recheck.     An After Visit Summary and PPPS were made available to the patient.                         PATY Monique MD, FACP, FHM      Electronically signed by Kervin Monique MD, 05/22/23, 8:40 AM CDT.    Additional E&M Note during same encounter follows:  Patient has multiple medical problems which are significant and separately identifiable that require additional work above and beyond the Medicare Wellness Visit.      Chief Complaint  Medicare Wellness-subsequent    Subjective        HPI  Keshia Fletcher is also being seen today for hyperlipidemia and dizziness.        Objective   Vitals:    05/22/23 0826   BP: 130/78   BP Location: Left arm   Patient Position: Sitting   Cuff Size: Adult   Pulse: 59   Temp: 97.6 °F (36.4 °C)   TempSrc: Temporal   SpO2: 98%   Weight: 71.2 kg (157 lb)   Height: 160 cm (63\")   PainSc:   3   PainLoc: Knee  Comment: arthritis     Physical Exam  Vitals reviewed.   HENT:      Head: Normocephalic and atraumatic.      Mouth/Throat:      Mouth: Mucous membranes are dry.      Pharynx: Oropharynx is clear.   Eyes:      General: No scleral icterus.     Conjunctiva/sclera: Conjunctivae normal.   Neck:      Thyroid: Thyromegaly present.   Cardiovascular:      Rate and Rhythm: Normal rate and regular rhythm.      Heart sounds: Murmur heard.   Pulmonary:      Effort: Pulmonary effort is normal.      Breath sounds: Normal breath sounds.   Neurological:      General: No focal deficit present.      Mental Status: She is alert and oriented to person, place, and time.   Psychiatric:         Mood and " Affect: Mood normal.         Behavior: Behavior normal.           CMP          10/27/2022    07:19 5/15/2023    07:33   CMP   Glucose 89   89     BUN 15   12     Creatinine 0.98   0.87     Sodium 141   140     Potassium 4.3   4.3     Chloride 105   105     Calcium 9.8   9.7     Total Protein 6.8   6.9     Albumin 4.90   4.8     Globulin 1.9   2.1     Total Bilirubin 0.6   0.6     Alkaline Phosphatase 143   125     AST (SGOT) 15   19     ALT (SGPT) 16   18     BUN/Creatinine Ratio 15.3   13.8       CBC          5/15/2023    07:33   CBC   WBC 4.79     RBC 4.62     Hemoglobin 14.3     Hematocrit 42.8     MCV 92.6     MCH 31.0     MCHC 33.4     RDW 12.4     Platelets 142       CBC w/diff          5/15/2023    07:33   CBC w/Diff   WBC 4.79     RBC 4.62     Hemoglobin 14.3     Hematocrit 42.8     MCV 92.6     MCH 31.0     MCHC 33.4     RDW 12.4     Platelets 142     Neutrophil Rel % 58.7     Lymphocyte Rel % 30.3     Monocyte Rel % 7.7     Eosinophil Rel % 1.9     Basophil Rel % 1.0       Lipid Panel          10/27/2022    07:19 5/15/2023    07:33   Lipid Panel   Total Cholesterol 245   214     Triglycerides 76   141     HDL Cholesterol 70   54     VLDL Cholesterol 13   25     LDL Cholesterol  162   135       TSH          5/15/2023    07:33   TSH   TSH 1.350       BMP          10/27/2022    07:19 5/15/2023    07:33   BMP   BUN 15   12     Creatinine 0.98   0.87     Sodium 141   140     Potassium 4.3   4.3     Chloride 105   105     CO2 25.2   25.0     Calcium 9.8   9.7           Results for orders placed in visit on 01/16/20    Adult Transthoracic Echo Complete W/ Cont if Necessary Per Protocol    Interpretation Summary  · Left ventricular systolic function is normal. Estimated ejection fraction is 66-70%.  · Left ventricular diastolic dysfunction.  · Normal right ventricular cavity size and systolic function noted.  · Mild aortic valve regurgitation is present.               Assessment and Plan   Diagnoses and all orders  for this visit:    1. Pure hypercholesterolemia (Primary)  -     atorvastatin (LIPITOR) 20 MG tablet; Take 1 tablet by mouth Every Night.  Dispense: 90 tablet; Refill: 2  -     Comprehensive metabolic panel; Future  -     Lipid panel; Future    2. Benign hypertension    3. Acquired hypothyroidism    4. Mild tricuspid insufficiency    5. Goiter, non-toxic    6. Gastroesophageal reflux disease without esophagitis    7. Alkaline phosphatase elevation    8. Overweight (BMI 25.0-29.9)    9. Vitamin D deficiency    10. Dizziness    11. Need for pneumococcal 20-valent conjugate vaccination  -     Pneumococcal Conjugate Vaccine 20-Valent All      ASCVD 13.3% - Which is the 10-year risk of having a cardiovascular event (coronary artery disease or stroke); with optimal risk factors it drops risk to 6.9%.      Patient agreeable to atorvastatin, will start with 20 mg.  Check CMP and lipid panel in 6 months.     Patient has a little bit of palpable enlargement of thyroid.  US reviewed, shows hyperemia previously.    Counseled on activity and portion control.  Patient walking more, approximately 20 minutes 5 times weekly.      Patient also had an episode of dizziness.  [place]  She indicates that she was looking down for a while, and then looked up into the sun, and had a prolonged episode of dizziness.  Patient did not have chest pain or shortness of breath.  This resolved on its own.  Discussed with the patient checking her pulse and rhythm whenever this occurs again.  Making sure to stay hydrated.  If she has any signs or symptoms of orthostasis we will need to back off of her blood pressure medication.         Follow Up   Return in about 6 months (around 11/22/2023), or if symptoms worsen or fail to improve, for Recheck.  Patient was given instructions and counseling regarding her condition or for health maintenance advice. Please see specific information pulled into the AVS if appropriate.

## 2023-06-01 ENCOUNTER — OFFICE VISIT (OUTPATIENT)
Dept: SURGERY | Facility: CLINIC | Age: 68
End: 2023-06-01

## 2023-06-01 VITALS
BODY MASS INDEX: 27.81 KG/M2 | HEIGHT: 63 IN | WEIGHT: 156.97 LBS | DIASTOLIC BLOOD PRESSURE: 78 MMHG | HEART RATE: 59 BPM | SYSTOLIC BLOOD PRESSURE: 130 MMHG

## 2023-06-01 DIAGNOSIS — Z12.31 SCREENING MAMMOGRAM FOR HIGH-RISK PATIENT: Primary | ICD-10-CM

## 2023-06-01 NOTE — PROGRESS NOTES
"Dianne Honeycutt MD [facility]    Referring Provider: No ref. provider found      Chief complaint   Chief Complaint   Patient presents with    Follow-up     Mrs. Fletcher is here for a breast f/u.         Subjective .     History of present illness:  Keshia Fletcher  is a 68 y.o. female who presents for yearly breast follow up.  She denies any breast, nipple, or skin changes.      History    The following portions of the patient's history were reviewed and updated as appropriate: allergies, current medications, past family history, past medical history, past social history, past surgical history, and problem list.    Objective     Vital Signs   /78 (BP Location: Left arm, Patient Position: Sitting, Cuff Size: Adult)   Pulse 59   Ht 160 cm (62.99\")   Wt 71.2 kg (156 lb 15.5 oz)   LMP  (LMP Unknown)   BMI 27.81 kg/m²      Physical Exam:    General The patient is well-developed, well-nourished, and in no acute distress.  Breast  Nipples everted without expressible discharge.  No erythema, edema, induration, or dimpling.  No palpable masses.  No axillary adenopathy.      Imaging:  [facility] screening mammogram 05/24/23:  negative    BMI is >= 25 and <30. (Overweight) The following options were offered after discussion;: referral to primary care    Assessment & Plan       Diagnoses and all orders for this visit:    1. Screening mammogram for high-risk patient (Primary)  -     Mammo Screening Bilateral With CAD; Future           An extensive review of patient intake forms, referring physician documents, laboratories, and imaging was performed in the medical decision making and surgical planning of this patient.     The patient will be scheduled for bilateral screening mammograms in one year followed by clinical examination.  She will return sooner with breast, nipple, or skin changes.      Dianne Honeycutt MD  06/01/23  17:07 CDT            "

## 2023-08-11 DIAGNOSIS — I10 BENIGN HYPERTENSION: ICD-10-CM

## 2023-08-11 RX ORDER — LISINOPRIL 10 MG/1
TABLET ORAL
Qty: 90 TABLET | Refills: 0 | Status: SHIPPED | OUTPATIENT
Start: 2023-08-11

## 2023-11-06 DIAGNOSIS — I10 BENIGN HYPERTENSION: ICD-10-CM

## 2023-11-06 RX ORDER — LISINOPRIL 10 MG/1
TABLET ORAL
Qty: 90 TABLET | Refills: 3 | Status: SHIPPED | OUTPATIENT
Start: 2023-11-06

## 2023-11-10 RX ORDER — FAMOTIDINE 40 MG/1
40 TABLET, FILM COATED ORAL DAILY
Qty: 90 TABLET | Refills: 1 | Status: SHIPPED | OUTPATIENT
Start: 2023-11-10

## 2023-11-10 NOTE — TELEPHONE ENCOUNTER
Caller: Keshia Fletcher SARITA    Relationship: Self    Best call back number:     [phone] (Mobile)       Requested Prescriptions:   Requested Prescriptions     Pending Prescriptions Disp Refills    famotidine (Pepcid) 40 MG tablet 90 tablet 3     Sig: Take 1 tablet by mouth Daily.        Pharmacy where request should be sent: [address] [phone] [facility] [phone]     Last office visit with prescribing clinician: 5/22/2023   Last telemedicine visit with prescribing clinician: Visit date not found   Next office visit with prescribing clinician: 11/27/2023         Does the patient have less than a 3 day supply:  [x] Yes  [] No    Would you like a call back once the refill request has been completed: [x] Yes [] No    If the office needs to give you a call back, can they leave a voicemail: [x] Yes [] No    Bailey Felder Rep   11/10/23 09:52 CST

## 2023-11-27 ENCOUNTER — OFFICE VISIT (OUTPATIENT)
Dept: INTERNAL MEDICINE | Facility: CLINIC | Age: 68
End: 2023-11-27
Payer: MEDICARE

## 2023-11-27 VITALS
TEMPERATURE: 97.5 F | OXYGEN SATURATION: 96 % | SYSTOLIC BLOOD PRESSURE: 128 MMHG | DIASTOLIC BLOOD PRESSURE: 70 MMHG | HEART RATE: 82 BPM | WEIGHT: 157 LBS | HEIGHT: 63 IN | BODY MASS INDEX: 27.82 KG/M2

## 2023-11-27 DIAGNOSIS — I10 BENIGN HYPERTENSION: Primary | ICD-10-CM

## 2023-11-27 DIAGNOSIS — E78.00 PURE HYPERCHOLESTEROLEMIA: ICD-10-CM

## 2023-11-27 DIAGNOSIS — E66.3 OVERWEIGHT (BMI 25.0-29.9): ICD-10-CM

## 2023-11-27 DIAGNOSIS — K21.9 GASTROESOPHAGEAL REFLUX DISEASE WITHOUT ESOPHAGITIS: ICD-10-CM

## 2023-11-27 DIAGNOSIS — Z23 INFLUENZA VACCINE ADMINISTERED: ICD-10-CM

## 2023-11-27 RX ORDER — PANTOPRAZOLE SODIUM 40 MG/1
40 TABLET, DELAYED RELEASE ORAL DAILY
Qty: 42 TABLET | Refills: 0 | Status: SHIPPED | OUTPATIENT
Start: 2023-11-27

## 2023-11-27 RX ORDER — AMLODIPINE BESYLATE 10 MG/1
10 TABLET ORAL DAILY
Qty: 90 TABLET | Refills: 3 | Status: SHIPPED | OUTPATIENT
Start: 2023-11-27

## 2023-11-27 NOTE — PROGRESS NOTES
"      Chief Complaint  Hypertension, Pure hypercholesterolemia, and Follow-up (6 month F/U )    Subjective        Keshia Fletcher presents to [facility]    HPI  Patient here for the above problems.  See Assessment and Plan for further HPI components.        Review of Systems    Objective   Vital Signs:  /70   Pulse 82   Temp 97.5 °F (36.4 °C)   Ht 160 cm (62.99\")   Wt 71.2 kg (157 lb)   SpO2 96%   BMI 27.82 kg/m²   Estimated body mass index is 27.82 kg/m² as calculated from the following:    Height as of this encounter: 160 cm (62.99\").    Weight as of this encounter: 71.2 kg (157 lb).      Physical Exam  Vitals reviewed.   Constitutional:       Appearance: She is not ill-appearing.   HENT:      Head: Normocephalic and atraumatic.      Mouth/Throat:      Mouth: Mucous membranes are dry.      Pharynx: Oropharynx is clear.   Eyes:      General: No scleral icterus.     Conjunctiva/sclera: Conjunctivae normal.   Cardiovascular:      Rate and Rhythm: Normal rate and regular rhythm.   Pulmonary:      Effort: Pulmonary effort is normal. No respiratory distress.   Skin:     General: Skin is warm.      Coloration: Skin is not pale.   Neurological:      General: No focal deficit present.      Mental Status: She is alert and oriented to person, place, and time.   Psychiatric:         Mood and Affect: Mood normal.         Behavior: Behavior normal.                       Assessment and Plan   Diagnoses and all orders for this visit:    1. Benign hypertension (Primary)  -     amLODIPine (NORVASC) 10 MG tablet; Take 1 tablet by mouth Daily.  Dispense: 90 tablet; Refill: 3    2. Pure hypercholesterolemia    3. Overweight (BMI 25.0-29.9)    4. Gastroesophageal reflux disease without esophagitis  -     pantoprazole (PROTONIX) 40 MG EC tablet; Take 1 tablet by mouth Daily.  Dispense: 42 tablet; Refill: 0    5. Influenza vaccine administered  -     Fluzone High-Dose 65+yrs " (6134-1913)      Patient blood pressure is well controlled.  Patient is at goal of less than 130/80.  Patient is tolerating her amlodipine, spironolactone, and lisinopril.  We will continue these medications at their current doses.  Need to keep an eye on potassium given that the patient is on an ACE as well as a mineralocorticoid.  Recommend ambulatory blood pressure monitoring.  Continue to monitor.    Patient has a history of hyperlipidemia, the patient is on Lipitor.  Recommend taking this medication nightly.  Patient is tolerating the medication without any significant issues.  We will continue to monitor at the present dosage.    Patient has had a little bit worsening of her GERD symptoms.  Her last EGD was in 2021 in which time she had a regular Z-line as well as needed for esophageal dilation.  Patient had no significant esophagitis noted at the time, but did have significant gastritis noted.  I recommended that we do a 6-week trial of a PPI, and then transition back to her H2 blocker at that time.  If she does not have any significant improvement, we will get her back in with Dr. Ervin for a possible EGD.    Recommend monitoring weight.  Increase activity as tolerated.        Result Review :  The following data was reviewed by: Kervin Monique MD on 11/27/2023:  CMP          5/15/2023    07:33   CMP   Glucose 89    BUN 12    Creatinine 0.87    Sodium 140    Potassium 4.3    Chloride 105    Calcium 9.7    Total Protein 6.9    Albumin 4.8    Globulin 2.1    Total Bilirubin 0.6    Alkaline Phosphatase 125    AST (SGOT) 19    ALT (SGPT) 18    BUN/Creatinine Ratio 13.8      CBC w/diff          5/15/2023    07:33   CBC w/Diff   WBC 4.79    RBC 4.62    Hemoglobin 14.3    Hematocrit 42.8    MCV 92.6    MCH 31.0    MCHC 33.4    RDW 12.4    Platelets 142    Neutrophil Rel % 58.7    Lymphocyte Rel % 30.3    Monocyte Rel % 7.7    Eosinophil Rel % 1.9    Basophil Rel % 1.0      Lipid Panel          5/15/2023     07:33   Lipid Panel   Total Cholesterol 214    Triglycerides 141    HDL Cholesterol 54    VLDL Cholesterol 25    LDL Cholesterol  135      TSH          5/15/2023    07:33   TSH   TSH 1.350          UA          5/15/2023    07:33   Urinalysis   Blood, UA Negative    Leukocytes, UA Trace    Nitrite, UA Negative    RBC, UA Comment    Bacteria, UA Trace                   BMI is >= 25 and <30. (Overweight) The following options were offered after discussion;: exercise counseling/recommendations and nutrition counseling/recommendations            Follow Up   Return in about 6 months (around 5/27/2024), or if symptoms worsen or fail to improve, for Medicare Wellness.  Patient was given instructions and counseling regarding her condition or for health maintenance advice. Please see specific information pulled into the AVS if appropriate.       PATY Monique MD, FACP, FHM      Electronically signed by Kervin Monique MD, 11/27/23, 12:45 PM CST.

## 2023-12-19 ENCOUNTER — TELEPHONE (OUTPATIENT)
Dept: INTERNAL MEDICINE | Facility: CLINIC | Age: 68
End: 2023-12-19
Payer: MEDICARE

## 2023-12-19 NOTE — TELEPHONE ENCOUNTER
Caller: Keshia Fletcher    Relationship: Self    Best call back number: [phone]    What medication are you requesting: Z PACK     What are your current symptoms: SINUS PRESSURE, HEADACHE, SORE THROAT, NO FEVER    How long have you been experiencing symptoms: 2 DAYS    Have you had these symptoms before:    [x] Yes  [] No    Have you been treated for these symptoms before:   [x] Yes  [] No    If a prescription is needed, what is your preferred pharmacy and phone number: [facility] [address] [phone] [facility] [phone]

## 2023-12-20 ENCOUNTER — OFFICE VISIT (OUTPATIENT)
Dept: INTERNAL MEDICINE | Facility: CLINIC | Age: 68
End: 2023-12-20
Payer: MEDICARE

## 2023-12-20 VITALS
DIASTOLIC BLOOD PRESSURE: 72 MMHG | HEIGHT: 63 IN | HEART RATE: 64 BPM | WEIGHT: 155 LBS | SYSTOLIC BLOOD PRESSURE: 124 MMHG | TEMPERATURE: 97.6 F | BODY MASS INDEX: 27.46 KG/M2 | OXYGEN SATURATION: 98 %

## 2023-12-20 DIAGNOSIS — J01.10 ACUTE NON-RECURRENT FRONTAL SINUSITIS: Primary | ICD-10-CM

## 2023-12-20 LAB
EXPIRATION DATE: NORMAL
FLUAV AG NPH QL: NEGATIVE
FLUBV AG NPH QL: NEGATIVE
INTERNAL CONTROL: NORMAL
Lab: NORMAL
S PYO AG THROAT QL: NEGATIVE
SARS-COV-2 AG UPPER RESP QL IA.RAPID: NOT DETECTED

## 2023-12-20 RX ORDER — AMOXICILLIN AND CLAVULANATE POTASSIUM 875; 125 MG/1; MG/1
1 TABLET, FILM COATED ORAL 2 TIMES DAILY
Qty: 14 TABLET | Refills: 0 | Status: SHIPPED | OUTPATIENT
Start: 2023-12-20 | End: 2023-12-27

## 2023-12-20 RX ORDER — TRIAMCINOLONE ACETONIDE 40 MG/ML
40 INJECTION, SUSPENSION INTRA-ARTICULAR; INTRAMUSCULAR ONCE
Status: COMPLETED | OUTPATIENT
Start: 2023-12-20 | End: 2023-12-20

## 2023-12-20 RX ADMIN — TRIAMCINOLONE ACETONIDE 40 MG: 40 INJECTION, SUSPENSION INTRA-ARTICULAR; INTRAMUSCULAR at 09:28

## 2023-12-20 NOTE — PROGRESS NOTES
Subjective     Chief Complaint:  Sore throat, headache, nasal congestion    HPI:  Patient presents today with complaints of a sore throat, headache, nasal congestion, and slight cough.  She reports that her symptoms started approximately 2 days ago but she did not want to wait until next week to be seen with the holiday weekend coming up.  She denies ear pain, sputum production, or shortness of breath.  She denies fever, chills, or bodyaches.  She has taken Tylenol Cold and flu which provided some mild relief.  She has only taken this a couple of times.  She cannot identify any recent sick contacts. COVID, flu, and strep swabs were obtained in the clinic today which were negative.    Past Medical History:   Past Medical History:   Diagnosis Date    Acid reflux     Arthritis     Heart murmur     Hx of colonic polyp     Hypertension     Hypothyroidism      Past Surgical History:  Past Surgical History:   Procedure Laterality Date    APPENDECTOMY  2007    BREAST BIOPSY Left 05/25/2017    Procedure: LEFT ULTRASOUND GUIDED NEEDLE DIRECT EXCISIONAL BIOPSY, MAMMOGRAM TO FOLLOW, BREAST;  Surgeon: Dianne Honeycutt MD;  Location: [facility] OR;  Service:     COLONOSCOPY N/A 03/31/2021    Sessille serrated adenoma at 40 cm, tubular adenoma at 30 cm repeat exam in 3 years    COLONOSCOPY W/ POLYPECTOMY  04/16/2015    2 Hyperplastic polyps at 30 cm repeat exam in 5 years    DILATION AND CURETTAGE, DIAGNOSTIC / THERAPEUTIC  1992, 2016    X 2     ENDOSCOPY N/A 08/24/2021    Procedure: ESOPHAGOGASTRODUODENOSCOPY WITH ANESTHESIA;  Surgeon: Tomas Ervin MD;  Location: [facility] ENDOSCOPY;  Service: Gastroenterology;  Laterality: N/A;  pre dysphagia  post stricture  Jose Justice MD    ENDOSCOPY N/A 09/14/2021    Procedure: ESOPHAGOGASTRODUODENOSCOPY WITH ANESTHESIA;  Surgeon: Tomas Ervin MD;  Location: [facility] ENDOSCOPY;  Service: Gastroenterology;  Laterality: N/A;  preop; dysphagia  postop; stricture   PCP Jose Justice      "KNEE ARTHROSCOPY W/ MENISCAL REPAIR Bilateral     LASIK Right        Allergies:  No Known Allergies  Medications:  Prior to Admission medications    Medication Sig Start Date End Date Taking? Authorizing Provider   amLODIPine (NORVASC) 10 MG tablet Take 1 tablet by mouth Daily. 11/27/23  Yes Kervin Monique MD   atorvastatin (LIPITOR) 20 MG tablet Take 1 tablet by mouth Every Night. 5/22/23  Yes Kervin Monique MD   Cholecalciferol (VITAMIN D3) 2000 UNITS capsule Take 1 capsule by mouth Daily.   Yes Provider, MD Jewell   Diclofenac Sodium (VOLTAREN) 1 % gel gel Apply 4 g topically to the appropriate area as directed 4 (Four) Times a Day As Needed (arthralgia). 5/19/22  Yes ErichDarcy machuca APRSOFIYA   Estrogens Conjugated (Premarin) 0.625 MG/GM vaginal cream 1/2 gram, twice weekly at bedtime 1/17/23  Yes Gillian Goff MD   ibuprofen (ADVIL,MOTRIN) 800 MG tablet Take 1 tablet by mouth As Needed for Mild Pain. 4/7/23  Yes Darcy Jung APRSOFIYA   levothyroxine (SYNTHROID, LEVOTHROID) 100 MCG tablet TAKE 1 TABLET DAILY. 5/4/23  Yes Kervin Monique MD   lisinopril (PRINIVIL,ZESTRIL) 10 MG tablet TAKE ONE TABLET BY MOUTH DAILY 11/6/23  Yes Kervin Monique MD   pantoprazole (PROTONIX) 40 MG EC tablet Take 1 tablet by mouth Daily. 11/27/23  Yes Kervin Monique MD   spironolactone (ALDACTONE) 25 MG tablet Take 1 tablet by mouth Daily. 7/3/23  Yes Kervin Monique MD   valACYclovir (VALTREX) 1000 MG tablet Take 1 tablet by mouth 2 (Two) Times a Day. 5/1/23  Yes Kervin Monique MD   famotidine (Pepcid) 40 MG tablet Take 1 tablet by mouth Daily.  Patient not taking: Reported on 12/20/2023 11/10/23   CINTHYA Don,        Objective     Vital Signs: /72 (BP Location: Left arm, Patient Position: Sitting, Cuff Size: Adult)   Pulse 64   Temp 97.6 °F (36.4 °C) (Temporal)   Ht 160 cm (62.99\")   Wt 70.3 kg (155 lb)   LMP  (LMP Unknown)   SpO2 98%   BMI 27.47 kg/m²   Physical " Exam  Vitals and nursing note reviewed.   Constitutional:       General: She is not in acute distress.     Appearance: Normal appearance.   HENT:      Right Ear: A middle ear effusion is present.      Left Ear: Tympanic membrane normal.  No middle ear effusion.      Nose: Congestion present.      Mouth/Throat:      Pharynx: Posterior oropharyngeal erythema present. No oropharyngeal exudate.   Cardiovascular:      Rate and Rhythm: Normal rate.      Pulses: Normal pulses.      Heart sounds: Normal heart sounds. No murmur heard.  Pulmonary:      Effort: Pulmonary effort is normal. No respiratory distress.      Breath sounds: Normal breath sounds. No wheezing or rales.   Abdominal:      General: There is no distension.      Palpations: Abdomen is soft.      Tenderness: There is no abdominal tenderness.   Musculoskeletal:         General: Normal range of motion.      Cervical back: Normal range of motion.   Lymphadenopathy:      Cervical: No cervical adenopathy.   Skin:     General: Skin is warm and dry.      Capillary Refill: Capillary refill takes less than 2 seconds.      Findings: No bruising, erythema or rash.   Neurological:      Mental Status: She is alert and oriented to person, place, and time. Mental status is at baseline.      Motor: No weakness.       Results Reviewed:  Results for orders placed or performed in visit on 12/20/23   POC Rapid Strep A    Specimen: Swab   Result Value Ref Range    Rapid Strep A Screen Negative Negative, VALID, INVALID, Not Performed    Internal Control Passed Passed    Lot Number [id number]     Expiration Date 3,302,026    POC Influenza A / B    Specimen: Swab   Result Value Ref Range    Rapid Influenza A Ag Negative Negative    Rapid Influenza B Ag Negative Negative    Internal Control Passed Passed    Lot Number [id number]     Expiration Date 12,072,025    POCT SARS-CoV-2 Antigen    Specimen: Nasopharynx; Swab   Result Value Ref Range    SARS Antigen Not Detected Not Detected,  Presumptive Negative    Internal Control Passed Passed    Lot Number [id number]     Expiration Date 6,558,617      Assessment / Plan     Assessment/Plan:  Diagnoses and all orders for this visit:    1. Acute non-recurrent frontal sinusitis (Primary)  -     POC Rapid Strep A  -     POC Influenza A / B  -     POCT SARS-CoV-2 Antigen  -     triamcinolone acetonide (KENALOG-40) injection 40 mg  -     amoxicillin-clavulanate (AUGMENTIN) 875-125 MG per tablet; Take 1 tablet by mouth 2 (Two) Times a Day for 7 days.  Dispense: 14 tablet; Refill: 0       We discussed that she most likely has a viral infection at this point since her symptoms have only been present for 2 days.  She received a Kenalog 40 mg injection in the clinic today.  I advised her to continue taking OTC medication.  We discussed that she would likely benefit from nasal decongestants.  She may take Delsym as needed for cough.  She may begin taking Mucinex if she develops chest congestion.  With the holiday weekend coming up, I will send in Augmentin.  We discussed that she should only start this if her symptoms worsen.  We specifically discussed that an antibiotic may be indicated if she develops green sputum production or fever.  She expressed understanding.  She will call if her symptoms worsen or fail to improve.    Return for Next scheduled follow up. unless patient needs to be seen sooner or acute issues arise.    I have discussed the patient results/orders and and plan/recommendation with them at today's visit.      Analia Salomon, APRN   12/20/2023

## 2024-01-31 ENCOUNTER — OFFICE VISIT (OUTPATIENT)
Dept: INTERNAL MEDICINE | Facility: CLINIC | Age: 69
End: 2024-01-31
Payer: MEDICARE

## 2024-01-31 VITALS
OXYGEN SATURATION: 98 % | HEART RATE: 89 BPM | DIASTOLIC BLOOD PRESSURE: 70 MMHG | HEIGHT: 63 IN | BODY MASS INDEX: 27.64 KG/M2 | TEMPERATURE: 97.8 F | WEIGHT: 156 LBS | SYSTOLIC BLOOD PRESSURE: 112 MMHG

## 2024-01-31 DIAGNOSIS — U07.1 COVID-19 VIRUS INFECTION: Primary | ICD-10-CM

## 2024-01-31 LAB
EXPIRATION DATE: ABNORMAL
EXPIRATION DATE: NORMAL
EXPIRATION DATE: NORMAL
FLUAV AG NPH QL: NEGATIVE
FLUBV AG NPH QL: NEGATIVE
INTERNAL CONTROL: ABNORMAL
INTERNAL CONTROL: NORMAL
INTERNAL CONTROL: NORMAL
Lab: ABNORMAL
Lab: NORMAL
Lab: NORMAL
S PYO AG THROAT QL: NEGATIVE
SARS-COV-2 AG UPPER RESP QL IA.RAPID: DETECTED

## 2024-01-31 RX ORDER — TRIAMCINOLONE ACETONIDE 40 MG/ML
40 INJECTION, SUSPENSION INTRA-ARTICULAR; INTRAMUSCULAR ONCE
Status: COMPLETED | OUTPATIENT
Start: 2024-01-31 | End: 2024-01-31

## 2024-01-31 RX ADMIN — TRIAMCINOLONE ACETONIDE 40 MG: 40 INJECTION, SUSPENSION INTRA-ARTICULAR; INTRAMUSCULAR at 10:51

## 2024-01-31 NOTE — PROGRESS NOTES
Subjective     Chief Complaint:  Cough, sore throat    HPI:  Patient presents today with complaints of cough and sore throat.  Please see assessment and plan below.    Past Medical History:   Past Medical History:   Diagnosis Date    Acid reflux     Arthritis     Heart murmur     Hx of colonic polyp     Hypertension     Hypothyroidism      Past Surgical History:  Past Surgical History:   Procedure Laterality Date    APPENDECTOMY  2007    BREAST BIOPSY Left 05/25/2017    Procedure: LEFT ULTRASOUND GUIDED NEEDLE DIRECT EXCISIONAL BIOPSY, MAMMOGRAM TO FOLLOW, BREAST;  Surgeon: Dianne Honeycutt MD;  Location: [facility] OR;  Service:     COLONOSCOPY N/A 03/31/2021    Sessille serrated adenoma at 40 cm, tubular adenoma at 30 cm repeat exam in 3 years    COLONOSCOPY W/ POLYPECTOMY  04/16/2015    2 Hyperplastic polyps at 30 cm repeat exam in 5 years    DILATION AND CURETTAGE, DIAGNOSTIC / THERAPEUTIC  1992, 2016    X 2     ENDOSCOPY N/A 08/24/2021    Procedure: ESOPHAGOGASTRODUODENOSCOPY WITH ANESTHESIA;  Surgeon: Tomas Ervin MD;  Location: [facility] ENDOSCOPY;  Service: Gastroenterology;  Laterality: N/A;  pre dysphagia  post stricture  Jose Justice MD    ENDOSCOPY N/A 09/14/2021    Procedure: ESOPHAGOGASTRODUODENOSCOPY WITH ANESTHESIA;  Surgeon: Tomas Ervin MD;  Location: [facility] ENDOSCOPY;  Service: Gastroenterology;  Laterality: N/A;  preop; dysphagia  postop; stricture   PCP Jose Justice     KNEE ARTHROSCOPY W/ MENISCAL REPAIR Bilateral     LASIK Right        Allergies:  No Known Allergies  Medications:  Prior to Admission medications    Medication Sig Start Date End Date Taking? Authorizing Provider   amLODIPine (NORVASC) 10 MG tablet Take 1 tablet by mouth Daily. 11/27/23   Kervin Monique MD   atorvastatin (LIPITOR) 20 MG tablet Take 1 tablet by mouth Every Night. 5/22/23   Kervin Monique MD   Cholecalciferol (VITAMIN D3) 2000 UNITS capsule Take 1 capsule by mouth Daily.    Provider,  "MD Jewell   Diclofenac Sodium (VOLTAREN) 1 % gel gel Apply 4 g topically to the appropriate area as directed 4 (Four) Times a Day As Needed (arthralgia). 5/19/22   Darcy Jung APRN   Estrogens Conjugated (Premarin) 0.625 MG/GM vaginal cream 1/2 gram, twice weekly at bedtime 1/17/23   Gillian Goff MD   famotidine (Pepcid) 40 MG tablet Take 1 tablet by mouth Daily.  Patient not taking: Reported on 12/20/2023 11/10/23   CINTHYA Don DO   ibuprofen (ADVIL,MOTRIN) 800 MG tablet Take 1 tablet by mouth As Needed for Mild Pain. 4/7/23   Darcy Jung APRN   levothyroxine (SYNTHROID, LEVOTHROID) 100 MCG tablet TAKE 1 TABLET DAILY. 5/4/23   Kervin Monique MD   lisinopril (PRINIVIL,ZESTRIL) 10 MG tablet TAKE ONE TABLET BY MOUTH DAILY 11/6/23   Kervin Monique MD   pantoprazole (PROTONIX) 40 MG EC tablet Take 1 tablet by mouth Daily. 11/27/23   Kervin Monique MD   spironolactone (ALDACTONE) 25 MG tablet Take 1 tablet by mouth Daily. 7/3/23   Kervin Monique MD   valACYclovir (VALTREX) 1000 MG tablet Take 1 tablet by mouth 2 (Two) Times a Day. 5/1/23   Kervin Monique MD       Objective     Vital Signs: /70 (BP Location: Left arm, Patient Position: Sitting, Cuff Size: Adult)   Pulse 89   Temp 97.8 °F (36.6 °C) (Infrared)   Ht 160 cm (62.99\")   Wt 70.8 kg (156 lb)   LMP  (LMP Unknown)   SpO2 98%   BMI 27.64 kg/m²   Physical Exam  Vitals and nursing note reviewed.   Constitutional:       General: She is not in acute distress.     Appearance: Normal appearance.   HENT:      Right Ear: Tympanic membrane normal.      Left Ear: Tympanic membrane normal.      Nose: Congestion present. No rhinorrhea.      Mouth/Throat:      Pharynx: Posterior oropharyngeal erythema present. No oropharyngeal exudate.   Cardiovascular:      Rate and Rhythm: Normal rate and regular rhythm.      Pulses: Normal pulses.      Heart sounds: Normal heart sounds. No murmur heard.  Pulmonary:    "   Effort: Pulmonary effort is normal. No respiratory distress.      Breath sounds: Normal breath sounds. No wheezing.   Musculoskeletal:         General: Normal range of motion.   Skin:     General: Skin is warm and dry.      Capillary Refill: Capillary refill takes less than 2 seconds.      Findings: No bruising, erythema or rash.   Neurological:      Mental Status: She is alert and oriented to person, place, and time. Mental status is at baseline.      Motor: No weakness.       Results Reviewed:  Results for orders placed or performed in visit on 01/31/24   POC Rapid Strep A    Specimen: Swab   Result Value Ref Range    Rapid Strep A Screen Negative Negative, VALID, INVALID, Not Performed    Internal Control Passed Passed    Lot Number [id number]     Expiration Date 4,062,026    POCT SARS-CoV-2 Antigen    Specimen: Nasopharynx; Swab   Result Value Ref Range    SARS Antigen Detected (A) Not Detected, Presumptive Negative    Internal Control Passed Passed    Lot Number [id number]     Expiration Date 7,112,024    POC Influenza A / B    Specimen: Swab   Result Value Ref Range    Rapid Influenza A Ag Negative Negative    Rapid Influenza B Ag Negative Negative    Internal Control Passed Passed    Lot Number [id number]     Expiration Date 11,302,024        Assessment / Plan     Assessment/Plan:  Diagnoses and all orders for this visit:    1. COVID-19 virus infection (Primary)  -     POC Rapid Strep A  -     POCT SARS-CoV-2 Antigen  -     POC Influenza A / B  -     triamcinolone acetonide (KENALOG-40) injection 40 mg       Patient presents today with complaints of sore throat and cough.  She states that her symptoms have been present for approximately 1 day.  She states that her cough is mostly dry and she feels that she just has a tickle in her throat.  She is able to cough up some sputum occasionally but it is clear in color.  She has also complained of a sore throat, nasal congestion, and headache.  She denies fever, chills,  or bodyaches.  She also denies shortness of breath.  She has tried taking Tylenol Cold and flu which did not provide any relief.  COVID, flu, and strep swabs were obtained today.  Patient was found to be positive for COVID.    We discussed Paxlovid versus symptom management.  She has elected to proceed with conservative management.  She will receive Kenalog 40 mg injection in the clinic today to help with her headache and nasal congestion.  I have advised her to begin taking Tylenol every 4-6 hours for the next couple of days to help with her symptoms.  She may take OTC sinus and cold medication as well.  I have advised her to rest and make sure that she stays well-hydrated.  Offered cough suppressant which she declines.  She will reach out if her symptoms worsen or fail to improve.     Return for Next scheduled follow up. unless patient needs to be seen sooner or acute issues arise.    I have discussed the patient results/orders and and plan/recommendation with them at today's visit.      Analia Salomon, APRN   01/31/2024

## 2024-02-22 DIAGNOSIS — E78.00 PURE HYPERCHOLESTEROLEMIA: ICD-10-CM

## 2024-02-22 RX ORDER — ATORVASTATIN CALCIUM 20 MG/1
20 TABLET, FILM COATED ORAL NIGHTLY
Qty: 90 TABLET | Refills: 3 | Status: SHIPPED | OUTPATIENT
Start: 2024-02-22

## 2024-03-18 ENCOUNTER — TELEPHONE (OUTPATIENT)
Dept: SURGERY | Facility: CLINIC | Age: 69
End: 2024-03-18
Payer: MEDICARE

## 2024-03-18 DIAGNOSIS — Z12.31 SCREENING MAMMOGRAM FOR HIGH-RISK PATIENT: Primary | ICD-10-CM

## 2024-03-18 NOTE — TELEPHONE ENCOUNTER
Caller: STANLEY RON    Relationship: SELF    Best call back number: [phone] (home)       What orders are you requesting (i.e. lab or imaging): MAMMO    In what timeframe would the patient need to come in: MAY 2023    Where will you receive your lab/imaging services: [facility]    Additional notes: I HAVE MAMMO BREAST SCHED 5.23.24

## 2024-03-28 RX ORDER — SPIRONOLACTONE 25 MG/1
25 TABLET ORAL DAILY
Qty: 90 TABLET | Refills: 0 | OUTPATIENT
Start: 2024-03-28

## 2024-03-28 NOTE — TELEPHONE ENCOUNTER
This was sent to [facility] July 2023 for a 1 year supply, so should be good until July.

## 2024-04-15 ENCOUNTER — TELEPHONE (OUTPATIENT)
Dept: GASTROENTEROLOGY | Facility: CLINIC | Age: 69
End: 2024-04-15
Payer: MEDICARE

## 2024-04-15 NOTE — TELEPHONE ENCOUNTER
Caller: Keshia Fletcher    Relationship: Self    Best call back number: [phone]    What is the best time to reach you: ANYTIME    Who are you requesting to speak with (clinical staff, provider,  specific staff member): SCHEDULING    What was the call regarding: PT RECEIVED RECALL LETTER TO SCHEDULE COLONOSCOPY. IT'S OK TO LVM.     Is it okay if the provider responds through MyChart: NO

## 2024-04-26 RX ORDER — LEVOTHYROXINE SODIUM 0.1 MG/1
TABLET ORAL
Qty: 90 TABLET | Refills: 3 | Status: SHIPPED | OUTPATIENT
Start: 2024-04-26

## 2024-04-29 ENCOUNTER — OFFICE VISIT (OUTPATIENT)
Dept: GASTROENTEROLOGY | Facility: CLINIC | Age: 69
End: 2024-04-29
Payer: MEDICARE

## 2024-04-29 VITALS
BODY MASS INDEX: 28.46 KG/M2 | SYSTOLIC BLOOD PRESSURE: 130 MMHG | WEIGHT: 160.6 LBS | HEIGHT: 63 IN | TEMPERATURE: 96.9 F | DIASTOLIC BLOOD PRESSURE: 76 MMHG | HEART RATE: 67 BPM | OXYGEN SATURATION: 98 %

## 2024-04-29 DIAGNOSIS — Z78.9 NONSMOKER: ICD-10-CM

## 2024-04-29 DIAGNOSIS — Z86.010 HX OF ADENOMATOUS COLONIC POLYPS: Primary | ICD-10-CM

## 2024-04-29 DIAGNOSIS — K21.9 GASTROESOPHAGEAL REFLUX DISEASE WITHOUT ESOPHAGITIS: ICD-10-CM

## 2024-04-29 DIAGNOSIS — R13.19 ESOPHAGEAL DYSPHAGIA: ICD-10-CM

## 2024-04-29 DIAGNOSIS — I10 HTN (HYPERTENSION), BENIGN: ICD-10-CM

## 2024-04-29 PROBLEM — Z86.0101 HX OF ADENOMATOUS COLONIC POLYPS: Status: ACTIVE | Noted: 2024-04-29

## 2024-04-29 NOTE — H&P (VIEW-ONLY)
Keshia Fletcher  1955 4/29/2024  Chief Complaint   Patient presents with    GI Problem     Diff swallowing,colon recall hx of polyps     Subjective   HPI  Keshia Fletcher is a 69 y.o. female who presents as a referral for preventative maintenance. She has no complaints of nausea or vomiting. No change in bowels. No wt loss. No BRBPR. No melena. There is no family hx for colon cancer. No abdominal pain.    Dysphagia with solid foods ongoing persistent for 6 months worse with certain foods,solids. Mid esophageal region. Dilated in the past and this helped her. No nausea or vomiting. She has had some more severe episodes.   Past Medical History:   Diagnosis Date    Acid reflux     Arthritis     Heart murmur     Hx of colonic polyp     Hypertension     Hypothyroidism      Past Surgical History:   Procedure Laterality Date    APPENDECTOMY  2007    BREAST BIOPSY Left 05/25/2017    Procedure: LEFT ULTRASOUND GUIDED NEEDLE DIRECT EXCISIONAL BIOPSY, MAMMOGRAM TO FOLLOW, BREAST;  Surgeon: Dianne Honeycutt MD;  Location: [facility];  Service:     COLONOSCOPY N/A 03/31/2021    Sessille serrated adenoma at 40 cm, tubular adenoma at 30 cm repeat exam in 3 years    COLONOSCOPY W/ POLYPECTOMY  04/16/2015    2 Hyperplastic polyps at 30 cm repeat exam in 5 years    DILATION AND CURETTAGE, DIAGNOSTIC / THERAPEUTIC  1992, 2016    X 2     ENDOSCOPY N/A 08/24/2021    - Normal examined duodenum. - Hiatal hernia. - Benign-appearing esophageal stenosis. Dilated. - No specimens collected    ENDOSCOPY N/A 09/14/2021    Normal examined duodenum. - Gastritis. Biopsied. - Hiatal hernia. - Z-line regular, 40 cm from the incisors. Dilated-neg for hpylori    KNEE ARTHROSCOPY W/ MENISCAL REPAIR Bilateral     LASIK Right      Outpatient Medications Marked as Taking for the 4/29/24 encounter (Office Visit) with Antonina Neri APRN   Medication Sig Dispense Refill    amLODIPine (NORVASC) 10 MG tablet Take 1 tablet by mouth Daily. 90 tablet 3     atorvastatin (LIPITOR) 20 MG tablet Take 1 tablet by mouth Every Night. 90 tablet 3    Cholecalciferol (VITAMIN D3) 2000 UNITS capsule Take 1 capsule by mouth Daily.      Diclofenac Sodium (VOLTAREN) 1 % gel gel Apply 4 g topically to the appropriate area as directed 4 (Four) Times a Day As Needed (arthralgia). 140 g 2    Estrogens Conjugated (Premarin) 0.625 MG/GM vaginal cream 1/2 gram, twice weekly at bedtime 30 g 5    famotidine (Pepcid) 40 MG tablet Take 1 tablet by mouth Daily. 90 tablet 1    ibuprofen (ADVIL,MOTRIN) 800 MG tablet Take 1 tablet by mouth As Needed for Mild Pain. 90 tablet 3    levothyroxine (SYNTHROID, LEVOTHROID) 100 MCG tablet TAKE 1 TABLET DAILY. 90 tablet 3    lisinopril (PRINIVIL,ZESTRIL) 10 MG tablet TAKE ONE TABLET BY MOUTH DAILY 90 tablet 3    spironolactone (ALDACTONE) 25 MG tablet Take 1 tablet by mouth Daily. 90 tablet 3    valACYclovir (VALTREX) 1000 MG tablet Take 1 tablet by mouth 2 (Two) Times a Day. 30 tablet 2    [DISCONTINUED] pantoprazole (PROTONIX) 40 MG EC tablet Take 1 tablet by mouth Daily. 42 tablet 0     No Known Allergies  Social History     Socioeconomic History    Marital status:    Tobacco Use    Smoking status: Never    Smokeless tobacco: Never   Vaping Use    Vaping status: Never Used   Substance and Sexual Activity    Alcohol use: Yes     Alcohol/week: 1.0 standard drink of alcohol     Types: 1 Drinks containing 0.5 oz of alcohol per week     Comment: rare    Drug use: No    Sexual activity: Defer     Partners: Male     Birth control/protection: Post-menopausal     Family History   Problem Relation Age of Onset    Breast cancer Mother 51    Cancer Mother     Pancreatic cancer Father     Cancer Father     Diabetes Sister     Kidney disease Sister     No Known Problems Sister     No Known Problems Sister     No Known Problems Brother     Prostate cancer Brother     No Known Problems Brother     Breast cancer Paternal Aunt     Colon cancer Neg Hx     Colon  "polyps Neg Hx      Health Maintenance   Topic Date Due    ZOSTER VACCINE (1 of 2) Never done    RSV Vaccine - Adults (1 - 1-dose 60+ series) Never done    PAP SMEAR  02/13/2022    MAMMOGRAM  05/11/2023    COVID-19 Vaccine (4 - 2023-24 season) 09/01/2023    COLORECTAL CANCER SCREENING  03/31/2024    LIPID PANEL  05/15/2024    ANNUAL WELLNESS VISIT  05/22/2024    INFLUENZA VACCINE  08/01/2024    BMI FOLLOWUP  11/27/2024    DXA SCAN  05/22/2025    TDAP/TD VACCINES (2 - Td or Tdap) 03/13/2027    HEPATITIS C SCREENING  Completed    Pneumococcal Vaccine 65+  Completed       REVIEW OF SYSTEMS  General: well appearing, no fever chills or sweats, no unexplained wt loss  HEENT: no acute visual or hearing disturbances  Cardiovascular: No chest pain or palpitations  Pulmonary: No shortness of breath, coughing, wheezing or hemoptysis  : No burning, urgency, hematuria, or dysuria  Musculoskeletal: No joint pain or stiffness  Peripheral: no edema  Skin: No lesions or rashes  Neuro: No dizziness, headaches, stroke, syncope  Endocrine: No hot or cold intolerances  Hematological: No blood dyscrasias    Objective   Vitals:    04/29/24 1446   BP: 130/76   BP Location: Left arm   Pulse: 67   Temp: 96.9 °F (36.1 °C)   TempSrc: Temporal   SpO2: 98%   Weight: 72.8 kg (160 lb 9.6 oz)   Height: 160 cm (62.99\")     Body mass index is 28.46 kg/m².         PHYSICAL EXAM  General: age appropriate well nourished well appearing, no acute distress  Head: normocephalic and atraumatic  Global assessment-supple  Neck-No JVD noted, no lymphadenopathy  Pulmonary-clear to auscultation bilaterally, normal respiratory effort  Cardiovascular-normal rate and rhythm, normal heart sounds, S1 and S2 noted  Abdomen-soft, non tender, non distended, normal bowel sounds all 4 quadrants, no hepatosplenomegaly noted  Extremities-No clubbing cyanosis or edema  Neuro-Non focal, converses appropriately, awake, alert, oriented    Assessment & Plan     Diagnoses and all " orders for this visit:    1. Hx of adenomatous colonic polyps (Primary)  Comments:  sessile serrated adenoma  Orders:  -     Case Request; Standing  -     Case Request    2. Esophageal dysphagia    3. Gastroesophageal reflux disease without esophagitis    4. HTN (hypertension), benign  Comments:  cont BP medication the day of procedure    5. Nonsmoker    Other orders  -     Implement Anesthesia Orders Day of Procedure; Standing  -     Follow Anesthesia Guidelines / Protocol; Future  -     Obtain Informed Consent; Future  -     Verify Bowel Prep Was Successful; Standing  -     Obtain Informed Consent; Standing        ESOPHAGOGASTRODUODENOSCOPY WITH ANESTHESIA (N/A), COLONOSCOPY WITH ANESTHESIA (N/A)  Body mass index is 28.46 kg/m².    Patient instructions on prep prior to procedure provided to the patient.    All risks, benefits, alternatives, and indications of colonoscopy procedure have been discussed with the patient. Risks to include perforation of the colon requiring possible surgery or colostomy, risk of bleeding from biopsies or removal of colon tissue, possibility of missing a colon polyp or cancer, or adverse drug reaction.  Benefits to include the diagnosis and management of disease of the colon and rectum. Alternatives to include barium enema, radiographic evaluation, lab testing or no intervention. Pt verbalizes understanding and agrees.     Antonina Neri, APRN  2024  15:44 CDT      IF YOU SMOKE OR USE TOBACCO PLEASE READ THE FOLLOWIN minutes reading provided    Why is smoking bad for me?  Smoking increases the risk of heart disease, lung disease, vascular disease, stroke, and cancer.     If you smoke, STOP!    If you would like more information on quitting smoking, please visit the [employer] website: [email or web address]   This link will provide additional resources including the QUIT line and the Beat the Pack support groups.     For more  information:    Quit Now [place]  1-800-QUIT-NOW  https://[place].quitlogix.org/en-[place]/

## 2024-04-29 NOTE — PROGRESS NOTES
Keshia Fletcher  1955 4/29/2024  Chief Complaint   Patient presents with    GI Problem     Diff swallowing,colon recall hx of polyps     Subjective   HPI  Keshia Fletcher is a 69 y.o. female who presents as a referral for preventative maintenance. She has no complaints of nausea or vomiting. No change in bowels. No wt loss. No BRBPR. No melena. There is no family hx for colon cancer. No abdominal pain.    Dysphagia with solid foods ongoing persistent for 6 months worse with certain foods,solids. Mid esophageal region. Dilated in the past and this helped her. No nausea or vomiting. She has had some more severe episodes.   Past Medical History:   Diagnosis Date    Acid reflux     Arthritis     Heart murmur     Hx of colonic polyp     Hypertension     Hypothyroidism      Past Surgical History:   Procedure Laterality Date    APPENDECTOMY  2007    BREAST BIOPSY Left 05/25/2017    Procedure: LEFT ULTRASOUND GUIDED NEEDLE DIRECT EXCISIONAL BIOPSY, MAMMOGRAM TO FOLLOW, BREAST;  Surgeon: Dianne Honeycutt MD;  Location: [facility];  Service:     COLONOSCOPY N/A 03/31/2021    Sessille serrated adenoma at 40 cm, tubular adenoma at 30 cm repeat exam in 3 years    COLONOSCOPY W/ POLYPECTOMY  04/16/2015    2 Hyperplastic polyps at 30 cm repeat exam in 5 years    DILATION AND CURETTAGE, DIAGNOSTIC / THERAPEUTIC  1992, 2016    X 2     ENDOSCOPY N/A 08/24/2021    - Normal examined duodenum. - Hiatal hernia. - Benign-appearing esophageal stenosis. Dilated. - No specimens collected    ENDOSCOPY N/A 09/14/2021    Normal examined duodenum. - Gastritis. Biopsied. - Hiatal hernia. - Z-line regular, 40 cm from the incisors. Dilated-neg for hpylori    KNEE ARTHROSCOPY W/ MENISCAL REPAIR Bilateral     LASIK Right      Outpatient Medications Marked as Taking for the 4/29/24 encounter (Office Visit) with Antonina Neri APRN   Medication Sig Dispense Refill    amLODIPine (NORVASC) 10 MG tablet Take 1 tablet by mouth Daily. 90 tablet 3     atorvastatin (LIPITOR) 20 MG tablet Take 1 tablet by mouth Every Night. 90 tablet 3    Cholecalciferol (VITAMIN D3) 2000 UNITS capsule Take 1 capsule by mouth Daily.      Diclofenac Sodium (VOLTAREN) 1 % gel gel Apply 4 g topically to the appropriate area as directed 4 (Four) Times a Day As Needed (arthralgia). 140 g 2    Estrogens Conjugated (Premarin) 0.625 MG/GM vaginal cream 1/2 gram, twice weekly at bedtime 30 g 5    famotidine (Pepcid) 40 MG tablet Take 1 tablet by mouth Daily. 90 tablet 1    ibuprofen (ADVIL,MOTRIN) 800 MG tablet Take 1 tablet by mouth As Needed for Mild Pain. 90 tablet 3    levothyroxine (SYNTHROID, LEVOTHROID) 100 MCG tablet TAKE 1 TABLET DAILY. 90 tablet 3    lisinopril (PRINIVIL,ZESTRIL) 10 MG tablet TAKE ONE TABLET BY MOUTH DAILY 90 tablet 3    spironolactone (ALDACTONE) 25 MG tablet Take 1 tablet by mouth Daily. 90 tablet 3    valACYclovir (VALTREX) 1000 MG tablet Take 1 tablet by mouth 2 (Two) Times a Day. 30 tablet 2    [DISCONTINUED] pantoprazole (PROTONIX) 40 MG EC tablet Take 1 tablet by mouth Daily. 42 tablet 0     No Known Allergies  Social History     Socioeconomic History    Marital status:    Tobacco Use    Smoking status: Never    Smokeless tobacco: Never   Vaping Use    Vaping status: Never Used   Substance and Sexual Activity    Alcohol use: Yes     Alcohol/week: 1.0 standard drink of alcohol     Types: 1 Drinks containing 0.5 oz of alcohol per week     Comment: rare    Drug use: No    Sexual activity: Defer     Partners: Male     Birth control/protection: Post-menopausal     Family History   Problem Relation Age of Onset    Breast cancer Mother 51    Cancer Mother     Pancreatic cancer Father     Cancer Father     Diabetes Sister     Kidney disease Sister     No Known Problems Sister     No Known Problems Sister     No Known Problems Brother     Prostate cancer Brother     No Known Problems Brother     Breast cancer Paternal Aunt     Colon cancer Neg Hx     Colon  "polyps Neg Hx      Health Maintenance   Topic Date Due    ZOSTER VACCINE (1 of 2) Never done    RSV Vaccine - Adults (1 - 1-dose 60+ series) Never done    PAP SMEAR  02/13/2022    MAMMOGRAM  05/11/2023    COVID-19 Vaccine (4 - 2023-24 season) 09/01/2023    COLORECTAL CANCER SCREENING  03/31/2024    LIPID PANEL  05/15/2024    ANNUAL WELLNESS VISIT  05/22/2024    INFLUENZA VACCINE  08/01/2024    BMI FOLLOWUP  11/27/2024    DXA SCAN  05/22/2025    TDAP/TD VACCINES (2 - Td or Tdap) 03/13/2027    HEPATITIS C SCREENING  Completed    Pneumococcal Vaccine 65+  Completed       REVIEW OF SYSTEMS  General: well appearing, no fever chills or sweats, no unexplained wt loss  HEENT: no acute visual or hearing disturbances  Cardiovascular: No chest pain or palpitations  Pulmonary: No shortness of breath, coughing, wheezing or hemoptysis  : No burning, urgency, hematuria, or dysuria  Musculoskeletal: No joint pain or stiffness  Peripheral: no edema  Skin: No lesions or rashes  Neuro: No dizziness, headaches, stroke, syncope  Endocrine: No hot or cold intolerances  Hematological: No blood dyscrasias    Objective   Vitals:    04/29/24 1446   BP: 130/76   BP Location: Left arm   Pulse: 67   Temp: 96.9 °F (36.1 °C)   TempSrc: Temporal   SpO2: 98%   Weight: 72.8 kg (160 lb 9.6 oz)   Height: 160 cm (62.99\")     Body mass index is 28.46 kg/m².         PHYSICAL EXAM  General: age appropriate well nourished well appearing, no acute distress  Head: normocephalic and atraumatic  Global assessment-supple  Neck-No JVD noted, no lymphadenopathy  Pulmonary-clear to auscultation bilaterally, normal respiratory effort  Cardiovascular-normal rate and rhythm, normal heart sounds, S1 and S2 noted  Abdomen-soft, non tender, non distended, normal bowel sounds all 4 quadrants, no hepatosplenomegaly noted  Extremities-No clubbing cyanosis or edema  Neuro-Non focal, converses appropriately, awake, alert, oriented    Assessment & Plan     Diagnoses and all " orders for this visit:    1. Hx of adenomatous colonic polyps (Primary)  Comments:  sessile serrated adenoma  Orders:  -     Case Request; Standing  -     Case Request    2. Esophageal dysphagia    3. Gastroesophageal reflux disease without esophagitis    4. HTN (hypertension), benign  Comments:  cont BP medication the day of procedure    5. Nonsmoker    Other orders  -     Implement Anesthesia Orders Day of Procedure; Standing  -     Follow Anesthesia Guidelines / Protocol; Future  -     Obtain Informed Consent; Future  -     Verify Bowel Prep Was Successful; Standing  -     Obtain Informed Consent; Standing        ESOPHAGOGASTRODUODENOSCOPY WITH ANESTHESIA (N/A), COLONOSCOPY WITH ANESTHESIA (N/A)  Body mass index is 28.46 kg/m².    Patient instructions on prep prior to procedure provided to the patient.    All risks, benefits, alternatives, and indications of colonoscopy procedure have been discussed with the patient. Risks to include perforation of the colon requiring possible surgery or colostomy, risk of bleeding from biopsies or removal of colon tissue, possibility of missing a colon polyp or cancer, or adverse drug reaction.  Benefits to include the diagnosis and management of disease of the colon and rectum. Alternatives to include barium enema, radiographic evaluation, lab testing or no intervention. Pt verbalizes understanding and agrees.     Antonina Neri, APRN  2024  15:44 CDT      IF YOU SMOKE OR USE TOBACCO PLEASE READ THE FOLLOWIN minutes reading provided    Why is smoking bad for me?  Smoking increases the risk of heart disease, lung disease, vascular disease, stroke, and cancer.     If you smoke, STOP!    If you would like more information on quitting smoking, please visit the [employer] website: [email or web address]   This link will provide additional resources including the QUIT line and the Beat the Pack support groups.     For more  information:    Quit Now [place]  1-800-QUIT-NOW  https://[place].quitlogix.org/en-[place]/

## 2024-05-03 ENCOUNTER — TELEPHONE (OUTPATIENT)
Dept: SURGERY | Facility: CLINIC | Age: 69
End: 2024-05-03
Payer: MEDICARE

## 2024-05-03 NOTE — TELEPHONE ENCOUNTER
Caller: Keshia Fletcher    Relationship: Self    Best call back number: [phone]     What orders are you requesting (i.e. lab or imaging): MAMMOGRAM    In what timeframe would the patient need to come in: BEFORE NEXT VISIT SCHED 6/5/24    Where will you receive your lab/imaging services: [facility] - [facility]    Additional notes: PT STATES SHE HAS CALLED AND THEY DON'T HAVE THE ORDER - IT USED TO BE CALLED [facility]

## 2024-05-22 ENCOUNTER — ANESTHESIA (OUTPATIENT)
Dept: GASTROENTEROLOGY | Facility: HOSPITAL | Age: 69
End: 2024-05-22
Payer: MEDICARE

## 2024-05-22 ENCOUNTER — ANESTHESIA EVENT (OUTPATIENT)
Dept: GASTROENTEROLOGY | Facility: HOSPITAL | Age: 69
End: 2024-05-22
Payer: MEDICARE

## 2024-05-22 ENCOUNTER — HOSPITAL ENCOUNTER (OUTPATIENT)
Facility: HOSPITAL | Age: 69
Setting detail: HOSPITAL OUTPATIENT SURGERY
Discharge: HOME | End: 2024-05-22
Attending: INTERNAL MEDICINE | Admitting: INTERNAL MEDICINE
Payer: MEDICARE

## 2024-05-22 VITALS
SYSTOLIC BLOOD PRESSURE: 127 MMHG | DIASTOLIC BLOOD PRESSURE: 56 MMHG | OXYGEN SATURATION: 100 % | RESPIRATION RATE: 14 BRPM | HEART RATE: 80 BPM | WEIGHT: 155 LBS | HEIGHT: 63 IN | BODY MASS INDEX: 27.46 KG/M2 | TEMPERATURE: 96.5 F

## 2024-05-22 DIAGNOSIS — Z86.010 HX OF ADENOMATOUS COLONIC POLYPS: ICD-10-CM

## 2024-05-22 PROCEDURE — 45385 COLONOSCOPY W/LESION REMOVAL: CPT | Performed by: INTERNAL MEDICINE

## 2024-05-22 PROCEDURE — 43235 EGD DIAGNOSTIC BRUSH WASH: CPT | Performed by: INTERNAL MEDICINE

## 2024-05-22 PROCEDURE — 43450 DILATE ESOPHAGUS 1/MULT PASS: CPT | Performed by: INTERNAL MEDICINE

## 2024-05-22 PROCEDURE — 25810000003 SODIUM CHLORIDE 0.9 % SOLUTION: Performed by: ANESTHESIOLOGY

## 2024-05-22 PROCEDURE — 88305 TISSUE EXAM BY PATHOLOGIST: CPT | Performed by: INTERNAL MEDICINE

## 2024-05-22 PROCEDURE — 25010000002 PROPOFOL 10 MG/ML EMULSION

## 2024-05-22 RX ORDER — LIDOCAINE HYDROCHLORIDE 20 MG/ML
INJECTION, SOLUTION EPIDURAL; INFILTRATION; INTRACAUDAL; PERINEURAL AS NEEDED
Status: DISCONTINUED | OUTPATIENT
Start: 2024-05-22 | End: 2024-05-22 | Stop reason: SURG

## 2024-05-22 RX ORDER — SODIUM CHLORIDE 0.9 % (FLUSH) 0.9 %
10 SYRINGE (ML) INJECTION AS NEEDED
Status: DISCONTINUED | OUTPATIENT
Start: 2024-05-22 | End: 2024-05-22 | Stop reason: HOSPADM

## 2024-05-22 RX ORDER — PROPOFOL 10 MG/ML
VIAL (ML) INTRAVENOUS AS NEEDED
Status: DISCONTINUED | OUTPATIENT
Start: 2024-05-22 | End: 2024-05-22 | Stop reason: SURG

## 2024-05-22 RX ORDER — SODIUM CHLORIDE 9 MG/ML
100 INJECTION, SOLUTION INTRAVENOUS CONTINUOUS
Status: DISCONTINUED | OUTPATIENT
Start: 2024-05-22 | End: 2024-05-22 | Stop reason: HOSPADM

## 2024-05-22 RX ORDER — SODIUM CHLORIDE 0.9 % (FLUSH) 0.9 %
10 SYRINGE (ML) INJECTION EVERY 12 HOURS SCHEDULED
Status: DISCONTINUED | OUTPATIENT
Start: 2024-05-22 | End: 2024-05-22 | Stop reason: HOSPADM

## 2024-05-22 RX ORDER — SODIUM CHLORIDE 9 MG/ML
40 INJECTION, SOLUTION INTRAVENOUS AS NEEDED
Status: CANCELLED | OUTPATIENT
Start: 2024-05-22

## 2024-05-22 RX ADMIN — LIDOCAINE HYDROCHLORIDE 150 MG: 20 INJECTION, SOLUTION EPIDURAL; INFILTRATION; INTRACAUDAL; PERINEURAL at 09:01

## 2024-05-22 RX ADMIN — PROPOFOL 400 MG: 10 INJECTION, EMULSION INTRAVENOUS at 09:01

## 2024-05-22 RX ADMIN — GLYCOPYRROLATE 0.1 MG: 0.2 INJECTION INTRAMUSCULAR; INTRAVENOUS at 08:58

## 2024-05-22 RX ADMIN — SODIUM CHLORIDE 100 ML/HR: 9 INJECTION, SOLUTION INTRAVENOUS at 07:55

## 2024-05-22 NOTE — ANESTHESIA PREPROCEDURE EVALUATION
Anesthesia Evaluation     Nursing notes reviewed   no history of anesthetic complications:   NPO Solid Status: > 8 hours  NPO Liquid Status: > 8 hours           Airway   Mallampati: I  Neck ROM: full  No difficulty expected  Dental      Pulmonary    (-) sleep apnea, not a smoker  Cardiovascular   Exercise tolerance: good (4-7 METS)    (+) hypertension, valvular problems/murmurs murmur, hyperlipidemia    ROS comment: Echo 1/24/20  · Left ventricular systolic function is normal. Estimated ejection fraction is 66-70%.  · Left ventricular diastolic dysfunction.  · Normal right ventricular cavity size and systolic function noted.  · Mild aortic valve regurgitation is present.       Neuro/Psych  (-) seizures, TIA, CVA  GI/Hepatic/Renal/Endo    (+) GERD, thyroid problem hypothyroidism  (-) liver disease, no renal disease, diabetes    Musculoskeletal     Abdominal    Substance History      OB/GYN          Other   arthritis,                       Anesthesia Plan    ASA 2     MAC     intravenous induction     Anesthetic plan, risks, benefits, and alternatives have been provided, discussed and informed consent has been obtained with: patient.

## 2024-05-22 NOTE — ANESTHESIA POSTPROCEDURE EVALUATION
Patient: Keshia Fletcher    Procedure Summary       Date: 05/22/24 Room / Location: [facility] ENDOSCOPY 5 / [facility]    Anesthesia Start: 0857 Anesthesia Stop: 0924    Procedures:       ESOPHAGOGASTRODUODENOSCOPY WITH ANESTHESIA      COLONOSCOPY WITH ANESTHESIA Diagnosis:       Hx of adenomatous colonic polyps      (Hx of adenomatous colonic polyps [Z86.010])    Surgeons: Tomas Ervin MD Provider: Beka Cox CRNA    Anesthesia Type: MAC ASA Status: 2            Anesthesia Type: MAC    Vitals  Vitals Value Taken Time   BP     Temp     Pulse 88 05/22/24 0924   Resp     SpO2     Vitals shown include unfiled device data.        Post Anesthesia Care and Evaluation    Patient location during evaluation: PHASE II  Patient participation: complete - patient participated  Level of consciousness: awake and alert  Pain score: 0    Airway patency: patent  Anesthetic complications: No anesthetic complications  PONV Status: none  Cardiovascular status: acceptable  Respiratory status: acceptable  Hydration status: acceptable  No anesthesia care post op

## 2024-05-23 LAB
CYTO UR: NORMAL
LAB AP CASE REPORT: NORMAL
Lab: NORMAL
PATH REPORT.FINAL DX SPEC: NORMAL
PATH REPORT.GROSS SPEC: NORMAL

## 2024-05-24 DIAGNOSIS — N94.10 DYSPAREUNIA IN FEMALE: ICD-10-CM

## 2024-05-24 DIAGNOSIS — N89.8 VAGINAL DRYNESS: ICD-10-CM

## 2024-05-24 RX ORDER — CONJUGATED ESTROGENS 0.62 MG/G
CREAM VAGINAL
Qty: 30 G | Refills: 0 | OUTPATIENT
Start: 2024-05-24

## 2024-05-29 ENCOUNTER — OFFICE VISIT (OUTPATIENT)
Dept: INTERNAL MEDICINE | Facility: CLINIC | Age: 69
End: 2024-05-29
Payer: MEDICARE

## 2024-05-29 VITALS
HEIGHT: 63 IN | DIASTOLIC BLOOD PRESSURE: 76 MMHG | WEIGHT: 157 LBS | SYSTOLIC BLOOD PRESSURE: 112 MMHG | TEMPERATURE: 97.6 F | OXYGEN SATURATION: 98 % | HEART RATE: 67 BPM | BODY MASS INDEX: 27.82 KG/M2

## 2024-05-29 DIAGNOSIS — E03.9 ACQUIRED HYPOTHYROIDISM: ICD-10-CM

## 2024-05-29 DIAGNOSIS — E78.00 PURE HYPERCHOLESTEROLEMIA: Primary | ICD-10-CM

## 2024-05-29 DIAGNOSIS — N28.9 RENAL INSUFFICIENCY: ICD-10-CM

## 2024-05-29 DIAGNOSIS — M85.80 OSTEOPENIA, UNSPECIFIED LOCATION: ICD-10-CM

## 2024-05-29 DIAGNOSIS — I10 BENIGN HYPERTENSION: ICD-10-CM

## 2024-05-29 DIAGNOSIS — R31.29 MICROSCOPIC HEMATURIA: ICD-10-CM

## 2024-05-29 DIAGNOSIS — E55.9 VITAMIN D DEFICIENCY: ICD-10-CM

## 2024-05-29 LAB
BILIRUB BLD-MCNC: NEGATIVE MG/DL
CLARITY, POC: ABNORMAL
COLOR UR: YELLOW
GLUCOSE UR STRIP-MCNC: NEGATIVE MG/DL
KETONES UR QL: NEGATIVE
LEUKOCYTE EST, POC: NEGATIVE
NITRITE UR-MCNC: NEGATIVE MG/ML
PH UR: 6 [PH] (ref 5–8)
PROT UR STRIP-MCNC: NEGATIVE MG/DL
RBC # UR STRIP: ABNORMAL /UL
SP GR UR: 1.01 (ref 1–1.03)
UROBILINOGEN UR QL: ABNORMAL

## 2024-05-29 PROCEDURE — 3078F DIAST BP <80 MM HG: CPT | Performed by: INTERNAL MEDICINE

## 2024-05-29 PROCEDURE — 81003 URINALYSIS AUTO W/O SCOPE: CPT | Performed by: INTERNAL MEDICINE

## 2024-05-29 PROCEDURE — 1126F AMNT PAIN NOTED NONE PRSNT: CPT | Performed by: INTERNAL MEDICINE

## 2024-05-29 PROCEDURE — 1159F MED LIST DOCD IN RCRD: CPT | Performed by: INTERNAL MEDICINE

## 2024-05-29 PROCEDURE — 1170F FXNL STATUS ASSESSED: CPT | Performed by: INTERNAL MEDICINE

## 2024-05-29 PROCEDURE — 1160F RVW MEDS BY RX/DR IN RCRD: CPT | Performed by: INTERNAL MEDICINE

## 2024-05-29 PROCEDURE — 3074F SYST BP LT 130 MM HG: CPT | Performed by: INTERNAL MEDICINE

## 2024-05-29 PROCEDURE — G0439 PPPS, SUBSEQ VISIT: HCPCS | Performed by: INTERNAL MEDICINE

## 2024-05-29 PROCEDURE — 96160 PT-FOCUSED HLTH RISK ASSMT: CPT | Performed by: INTERNAL MEDICINE

## 2024-05-29 PROCEDURE — 99213 OFFICE O/P EST LOW 20 MIN: CPT | Performed by: INTERNAL MEDICINE

## 2024-05-29 NOTE — PROGRESS NOTES
The ABCs of the Annual Wellness Visit  Subsequent Medicare Wellness Visit    Chief Complaint   Patient presents with    Medicare Wellness-subsequent      Subjective    History of Present Illness:  Keshia Fletcher is a 69 y.o. female who presents for a Subsequent Medicare Wellness Visit.    The following portions of the patient's history were reviewed and   updated as appropriate: allergies, current medications, past family history, past medical history, past social history, past surgical history and problem list.    Compared to one year ago, the patient feels her physical   health is the same.    Compared to one year ago, the patient feels her mental   health is the same.    Recent Hospitalizations:  She was not admitted to the hospital during the last year.       Current Medical Providers:  Patient Care Team:  Kervin Monique MD as PCP - General (Internal Medicine)  Tomas Ervin MD as Consulting Physician (Gastroenterology)    Outpatient Medications Prior to Visit   Medication Sig Dispense Refill    amLODIPine (NORVASC) 10 MG tablet Take 1 tablet by mouth Daily. 90 tablet 3    atorvastatin (LIPITOR) 20 MG tablet Take 1 tablet by mouth Every Night. 90 tablet 3    Cholecalciferol (VITAMIN D3) 2000 UNITS capsule Take 1 capsule by mouth Daily.      Diclofenac Sodium (VOLTAREN) 1 % gel gel Apply 4 g topically to the appropriate area as directed 4 (Four) Times a Day As Needed (arthralgia). 140 g 2    Estrogens Conjugated (Premarin) 0.625 MG/GM vaginal cream 1/2 gram, twice weekly at bedtime 30 g 5    famotidine (Pepcid) 40 MG tablet Take 1 tablet by mouth Daily. 90 tablet 1    ibuprofen (ADVIL,MOTRIN) 800 MG tablet Take 1 tablet by mouth As Needed for Mild Pain. 90 tablet 3    levothyroxine (SYNTHROID, LEVOTHROID) 100 MCG tablet TAKE 1 TABLET DAILY. 90 tablet 3    lisinopril (PRINIVIL,ZESTRIL) 10 MG tablet TAKE ONE TABLET BY MOUTH DAILY 90 tablet 3    spironolactone (ALDACTONE) 25 MG tablet Take 1 tablet by  "mouth Daily. 90 tablet 3    valACYclovir (VALTREX) 1000 MG tablet Take 1 tablet by mouth 2 (Two) Times a Day. 30 tablet 2     No facility-administered medications prior to visit.       No opioid medication identified on active medication list. I have reviewed chart for other potential  high risk medication/s and harmful drug interactions in the elderly.        Aspirin is not on active medication list.  Aspirin use is not indicated based on review of current medical condition/s. Risk of harm outweighs potential benefits.  .    Patient Active Problem List   Diagnosis    Alkaline phosphatase elevation    Acquired kyphosis    Pure hypercholesterolemia    Gastroesophageal reflux disease without esophagitis    Goiter, non-toxic    Mild tricuspid insufficiency    Myxedema heart disease    Osteopenia    Rheumatoid arthritis    Systolic murmur    Benign hypertension    Vitamin D deficiency    TMJ crepitus    Hypothyroidism    Overweight (BMI 25.0-29.9)    Dizziness    Hx of adenomatous colonic polyps     Advance Care Planning  Advance Directive is not on file.  ACP discussion was held with the patient during this visit. Patient does not have an advance directive, information provided.       Objective    Vitals:    05/29/24 0800   BP: 112/76   BP Location: Left arm   Patient Position: Sitting   Cuff Size: Adult   Pulse: 67   Temp: 97.6 °F (36.4 °C)   TempSrc: Temporal   SpO2: 98%   Weight: 71.2 kg (157 lb)   Height: 160 cm (63\")   PainSc: 0-No pain     Estimated body mass index is 27.81 kg/m² as calculated from the following:    Height as of this encounter: 160 cm (63\").    Weight as of this encounter: 71.2 kg (157 lb).           Does the patient have evidence of cognitive impairment? No      Lab Results   Component Value Date    CHLPL 149 05/23/2024    TRIG 61 05/23/2024    HDL 54 05/23/2024    LDL 83 05/23/2024    VLDL 12 05/23/2024            HEALTH RISK ASSESSMENT    Smoking Status:  Social History     Tobacco Use "   Smoking Status Never   Smokeless Tobacco Never     Alcohol Consumption:  Social History     Substance and Sexual Activity   Alcohol Use Yes    Alcohol/week: 1.0 standard drink of alcohol    Types: 1 Drinks containing 0.5 oz of alcohol per week    Comment: rare     Fall Risk Screen:    MIHIR Fall Risk Assessment was completed, and patient is at LOW risk for falls.Assessment completed on:2024    Depression Screenin/29/2024     8:00 AM   PHQ-2/PHQ-9 Depression Screening   Little Interest or Pleasure in Doing Things 0-->not at all   Feeling Down, Depressed or Hopeless 0-->not at all   PHQ-9: Brief Depression Severity Measure Score 0       Health Habits and Functional and Cognitive Screenin/29/2024     8:00 AM   Functional & Cognitive Status   Do you have difficulty preparing food and eating? No   Do you have difficulty bathing yourself, getting dressed or grooming yourself? No   Do you have difficulty using the toilet? No   Do you have difficulty moving around from place to place? No   Do you have trouble with steps or getting out of a bed or a chair? No   Current Diet Limited Junk Food   Dental Exam Up to date   Eye Exam Up to date   Exercise (times per week) 5 times per week   Current Exercises Include House Cleaning;Yard Work;Walking   Do you need help using the phone?  No   Are you deaf or do you have serious difficulty hearing?  No   Do you need help to go to places out of walking distance? No   Do you need help shopping? No   Do you need help preparing meals?  No   Do you need help with housework?  No   Do you need help with laundry? No   Do you need help taking your medications? No   Do you need help managing money? No   Do you ever drive or ride in a car without wearing a seat belt? No   Have you felt unusual stress, anger or loneliness in the last month? No   Who do you live with? Spouse   If you need help, do you have trouble finding someone available to you? No   Have you been  bothered in the last four weeks by sexual problems? No   Do you have difficulty concentrating, remembering or making decisions? No       Age-appropriate Screening Schedule:  Refer to the list below for future screening recommendations based on patient's age, sex and/or medical conditions. Orders for these recommended tests are listed in the plan section. The patient has been provided with a written plan.    Health Maintenance   Topic Date Due    RSV Vaccine - Adults (1 - 1-dose 60+ series) Never done    PAP SMEAR  02/13/2022    COVID-19 Vaccine (4 - 2023-24 season) 09/01/2023    ZOSTER VACCINE (2 of 3) 12/02/2024 (Originally 1/26/2017)    INFLUENZA VACCINE  08/01/2024    BMI FOLLOWUP  11/27/2024    DXA SCAN  05/22/2025    LIPID PANEL  05/23/2025    ANNUAL WELLNESS VISIT  05/29/2025    MAMMOGRAM  05/23/2026    TDAP/TD VACCINES (2 - Td or Tdap) 03/13/2027    COLORECTAL CANCER SCREENING  05/22/2029    HEPATITIS C SCREENING  Completed    Pneumococcal Vaccine 65+  Completed              Assessment & Plan   CMS Preventative Services Quick Reference  Risk Factors Identified During Encounter  Immunizations Discussed/Encouraged: Shingrix and RSV (Respiratory Syncytial Virus)  Dental Screening Recommended  Vision Screening Recommended  The above risks/problems have been discussed with the patient.  Follow up actions/plans if indicated are seen below in the Assessment/Plan Section.  Pertinent information has been shared with the patient in the After Visit Summary.    Diagnoses and all orders for this visit:    1. Pure hypercholesterolemia (Primary)  -     Lipid Panel; Future  -     TSH Rfx On Abnormal To Free T4; Future    2. Vitamin D deficiency  -     Vitamin D,25-Hydroxy; Future    3. Benign hypertension  -     CBC & Differential; Future  -     Comprehensive Metabolic Panel; Future  -     Urinalysis With Microscopic - Urine, Clean Catch; Future    4. Acquired hypothyroidism  -     TSH Rfx On Abnormal To Free T4;  Future    5. Osteopenia, unspecified location  -     Vitamin D,25-Hydroxy; Future    6. Microscopic hematuria  -     Urinalysis With Microscopic - Urine, Clean Catch  -     POC Urinalysis Dipstick, Multipro        Recommend at least annual dental and vision screening.  Recommend annual influenza vaccination  Recommend a varied diet and appropriate portion sizes.   CDC recommendations for physical activity:  At least 150 minutes a week (for example, 30 minutes a day, 5 days a week) of moderate-intensity activity such as brisk walking. Or can consider 75 minutes a week of vigorous-intensity activity such as hiking, jogging, or running.  At least 2 days a week of activities that strengthen muscles.  Plus activities to improve balance.    Patient thinks that she had a shingles vaccine years ago.  We have no record of it.  There is no prescription that we can see that was sent.  We do not see anything from [facility] [place] either.  May consider vaccination unless she has any proof of vaccination.    Provided information on living will.      Result Review :           Follow Up:   Return in about 6 months (around 11/29/2024), or if symptoms worsen or fail to improve.     An After Visit Summary and PPPS were made available to the patient.                         PATY Monique MD, FACP, [facility]      Electronically signed by Kervin Monique MD, 05/29/24, 9:34 AM CDT.    Additional E&M Note during same encounter follows:  Patient has multiple medical problems which are significant and separately identifiable that require additional work above and beyond the Medicare Wellness Visit.      Chief Complaint  Medicare Wellness-subsequent    Subjective        HPI  Keshia Fletcher is also being seen today for renal insufficiency, pap smear, hematuria  Patient here for the above problems.  See Assessment and Plan for further HPI components.        Objective   Vitals:    05/29/24 0800   BP: 112/76   BP Location: Left arm   Patient Position:  "Sitting   Cuff Size: Adult   Pulse: 67   Temp: 97.6 °F (36.4 °C)   TempSrc: Temporal   SpO2: 98%   Weight: 71.2 kg (157 lb)   Height: 160 cm (63\")   PainSc: 0-No pain     Physical Exam  Vitals and nursing note reviewed.   Constitutional:       Appearance: She is not ill-appearing.   Eyes:      General: No scleral icterus.     Conjunctiva/sclera: Conjunctivae normal.   Cardiovascular:      Rate and Rhythm: Normal rate and regular rhythm.   Pulmonary:      Effort: Pulmonary effort is normal. No respiratory distress.   Skin:     General: Skin is warm.      Coloration: Skin is not pale.   Neurological:      General: No focal deficit present.      Mental Status: She is alert and oriented to person, place, and time.   Psychiatric:         Mood and Affect: Mood normal.         Behavior: Behavior normal.              The following data was reviewed by: Kervin Monique MD on 05/29/2024:  CMP          5/23/2024    07:10   CMP   Glucose 97    BUN 13    Creatinine 1.11    Sodium 137    Potassium 4.6    Chloride 105    Calcium 9.2    Total Protein 6.4    Albumin 4.5    Globulin 1.9    Total Bilirubin 0.6    Alkaline Phosphatase 137    AST (SGOT) 20    ALT (SGPT) 20    BUN/Creatinine Ratio 11.7      CBC w/diff          5/23/2024    07:10   CBC w/Diff   WBC 5.11    RBC 4.39    Hemoglobin 13.5    Hematocrit 41.1    MCV 93.6    MCH 30.8    MCHC 32.8    RDW 12.0    Platelets 141    Neutrophil Rel % 65.7    Lymphocyte Rel % 22.7    Monocyte Rel % 8.8    Eosinophil Rel % 1.6    Basophil Rel % 1.0      Lipid Panel          5/23/2024    07:10   Lipid Panel   Total Cholesterol 149    Triglycerides 61    HDL Cholesterol 54    VLDL Cholesterol 12    LDL Cholesterol  83      TSH          5/23/2024    07:10   TSH   TSH 0.648          UA          5/23/2024    07:10 5/29/2024    09:04   Urinalysis   Ketones, UA  Negative    Blood, UA Trace     Leukocytes, UA See below:  Negative    Nitrite, UA Negative     RBC, UA 0-2     Bacteria, UA 1+   "                Assessment and Plan   Diagnoses and all orders for this visit:    1. Pure hypercholesterolemia (Primary)  -     Lipid Panel; Future  -     TSH Rfx On Abnormal To Free T4; Future    2. Vitamin D deficiency  -     Vitamin D,25-Hydroxy; Future    3. Benign hypertension  -     CBC & Differential; Future  -     Comprehensive Metabolic Panel; Future  -     Urinalysis With Microscopic - Urine, Clean Catch; Future    4. Acquired hypothyroidism  -     TSH Rfx On Abnormal To Free T4; Future    5. Osteopenia, unspecified location  -     Vitamin D,25-Hydroxy; Future    6. Microscopic hematuria  -     Urinalysis With Microscopic - Urine, Clean Catch  -     POC Urinalysis Dipstick, Multipro      Patient asked when her Pap smear is due.  The last one that I see is from 2019.  That 1 showed ASCUS with negative HPV and the comment indicated that she needed a repeat in 1 year.  However that was during COVID.  And that 1 year follow-up was canceled.  Patient has since seen OB/GYN and has had pelvic exams, I do not see a Pap smear though.  Recommended that she follow-up with OB/GYN, I will defer to them on when or if she is due for another.    Patient had microscopic hematuria on her annual labs.  She also had a little bit of renal insufficiency.  However, to find out this was after her recent colonoscopy.  Suspect that the patient was a little bit dehydrated.  Recheck of urine today shows a dipstick with some hematuria on it, we will send for microscopic.  She had dipstick hematuria on the original ones as well, but they were noted to be turbid.  If this shows hematuria microscopically, will be we will recheck, and consider further workup at that point in time.         Follow Up   Return in about 6 months (around 11/29/2024), or if symptoms worsen or fail to improve.  Patient was given instructions and counseling regarding her condition or for health maintenance advice. Please see specific information pulled into the  AVS if appropriate.

## 2024-05-30 LAB
APPEARANCE UR: ABNORMAL
BACTERIA #/AREA URNS HPF: ABNORMAL /HPF
BILIRUB UR QL STRIP: NEGATIVE
CASTS URNS MICRO: ABNORMAL
COLOR UR: YELLOW
EPI CELLS #/AREA URNS HPF: ABNORMAL /HPF
GLUCOSE UR QL STRIP: NEGATIVE
HGB UR QL STRIP: NEGATIVE
KETONES UR QL STRIP: NEGATIVE
LEUKOCYTE ESTERASE UR QL STRIP: NEGATIVE
NITRITE UR QL STRIP: NEGATIVE
PH UR STRIP: 6 [PH] (ref 5–8)
PROT UR QL STRIP: NEGATIVE
RBC #/AREA URNS HPF: ABNORMAL /HPF
SP GR UR STRIP: 1.01 (ref 1–1.03)
UROBILINOGEN UR STRIP-MCNC: ABNORMAL MG/DL
WBC #/AREA URNS HPF: ABNORMAL /HPF

## 2024-06-05 ENCOUNTER — OFFICE VISIT (OUTPATIENT)
Dept: SURGERY | Facility: CLINIC | Age: 69
End: 2024-06-05
Payer: MEDICARE

## 2024-06-05 ENCOUNTER — TELEPHONE (OUTPATIENT)
Dept: OBSTETRICS AND GYNECOLOGY | Age: 69
End: 2024-06-05

## 2024-06-05 VITALS
HEIGHT: 63 IN | DIASTOLIC BLOOD PRESSURE: 62 MMHG | WEIGHT: 160 LBS | HEART RATE: 83 BPM | SYSTOLIC BLOOD PRESSURE: 124 MMHG | OXYGEN SATURATION: 98 % | BODY MASS INDEX: 28.35 KG/M2

## 2024-06-05 DIAGNOSIS — Z12.31 ENCOUNTER FOR SCREENING MAMMOGRAM FOR BREAST CANCER: Primary | ICD-10-CM

## 2024-06-05 DIAGNOSIS — N94.10 DYSPAREUNIA IN FEMALE: ICD-10-CM

## 2024-06-05 DIAGNOSIS — N89.8 VAGINAL DRYNESS: ICD-10-CM

## 2024-06-05 PROCEDURE — 1160F RVW MEDS BY RX/DR IN RCRD: CPT

## 2024-06-05 PROCEDURE — 99213 OFFICE O/P EST LOW 20 MIN: CPT

## 2024-06-05 PROCEDURE — 3074F SYST BP LT 130 MM HG: CPT

## 2024-06-05 PROCEDURE — 1159F MED LIST DOCD IN RCRD: CPT

## 2024-06-05 PROCEDURE — 3078F DIAST BP <80 MM HG: CPT

## 2024-06-05 RX ORDER — CONJUGATED ESTROGENS 0.62 MG/G
CREAM VAGINAL
Qty: 30 G | Refills: 5 | Status: SHIPPED | OUTPATIENT
Start: 2024-06-05

## 2024-06-05 NOTE — TELEPHONE ENCOUNTER
Pt needing refill on premarin vaginal cream. Pt last OV1/2023. Next annual scheduled for 1/2025. Please advise.

## 2024-06-05 NOTE — Clinical Note
Patient is going to have you order her yearly mammograms and return to our clinic on an as needed basis.

## 2024-06-05 NOTE — PROGRESS NOTES
Office Established Patient Note:     Referring Provider: No ref. provider found    Chief Complaint   Patient presents with    Follow-up     1 year breast follow up        Subjective .     History of present illness:  Keshia Fletcher is a 69 y.o. female who presents to the clinic for yearly breast follow up. She previously was seen in this office by Dr. Honeycutt. She has a benign L breast biopsy in 2017 and has no abnormal mammograms since. She denies palpable masses or nipple discharge of either breast. She does endorse concerns about a skin lesion of the left breast. She states that it has been there for years, but the scab came off of it approximately 3 months ago and has not looked right since. She has seen dermatology in the past who have stated that it is not a concerning lesion. She has a follow up with them in July 2024.     BMI is 28.34. She is a nonsmoker. She does not take any blood thinners.     Review of Systems    Review of Systems - General ROS: negative  ENT ROS: negative  Respiratory ROS: no cough, shortness of breath, or wheezing  Cardiovascular ROS: no chest pain or dyspnea on exertion  Gastrointestinal ROS: no abdominal pain, change in bowel habits, or black or bloody stools  Genito-Urinary ROS: no dysuria, trouble voiding, or hematuria  Dermatological ROS: positive for skin lesion changes   Breast ROS: negative for breast lumps  Hematological and Lymphatic ROS: negative  Musculoskeletal ROS: negative   Neurological ROS: no TIA or stroke symptoms    Psychological ROS: negative  Endocrine ROS: negative    History  Past Medical History:   Diagnosis Date    Acid reflux     Arthritis     Heart murmur     Hx of colonic polyp     Hypertension     Hypothyroidism    ,   Past Surgical History:   Procedure Laterality Date    APPENDECTOMY  2007    BREAST BIOPSY Left 05/25/2017    Procedure: LEFT ULTRASOUND GUIDED NEEDLE DIRECT EXCISIONAL BIOPSY, MAMMOGRAM TO FOLLOW, BREAST;  Surgeon: Dianne Honeycutt MD;   Location: [facility] OR;  Service:     COLONOSCOPY N/A 03/31/2021    Sessille serrated adenoma at 40 cm, tubular adenoma at 30 cm repeat exam in 3 years    COLONOSCOPY N/A 5/22/2024    Procedure: COLONOSCOPY WITH ANESTHESIA;  Surgeon: Tomas Ervin MD;  Location: [facility] ENDOSCOPY;  Service: Gastroenterology;  Laterality: N/A;  pre: hx polyps  post:polyp  oliva    COLONOSCOPY W/ POLYPECTOMY  04/16/2015    2 Hyperplastic polyps at 30 cm repeat exam in 5 years    DILATION AND CURETTAGE, DIAGNOSTIC / THERAPEUTIC  1992, 2016    X 2     ENDOSCOPY N/A 08/24/2021    - Normal examined duodenum. - Hiatal hernia. - Benign-appearing esophageal stenosis. Dilated. - No specimens collected    ENDOSCOPY N/A 09/14/2021    Normal examined duodenum. - Gastritis. Biopsied. - Hiatal hernia. - Z-line regular, 40 cm from the incisors. Dilated-neg for hpylori    ENDOSCOPY N/A 5/22/2024    Procedure: ESOPHAGOGASTRODUODENOSCOPY WITH ANESTHESIA;  Surgeon: Tomas Ervin MD;  Location: [facility] ENDOSCOPY;  Service: Gastroenterology;  Laterality: N/A;  pre: reflux  post: dilated esophagus   oliva    KNEE ARTHROSCOPY W/ MENISCAL REPAIR Bilateral     LASIK Right    ,   Family History   Problem Relation Age of Onset    Breast cancer Mother 51    Cancer Mother     Pancreatic cancer Father     Cancer Father     Diabetes Sister     Kidney disease Sister     No Known Problems Sister     No Known Problems Sister     No Known Problems Brother     Prostate cancer Brother     No Known Problems Brother     Breast cancer Paternal Aunt     Colon cancer Neg Hx     Colon polyps Neg Hx    ,   Social History     Tobacco Use    Smoking status: Never    Smokeless tobacco: Never   Vaping Use    Vaping status: Never Used   Substance Use Topics    Alcohol use: Yes     Alcohol/week: 1.0 standard drink of alcohol     Types: 1 Drinks containing 0.5 oz of alcohol per week     Comment: rare    Drug use: No   , (Not in a hospital admission)   and Allergies:  Patient has  "no known allergies.    Current Outpatient Medications:     amLODIPine (NORVASC) 10 MG tablet, Take 1 tablet by mouth Daily., Disp: 90 tablet, Rfl: 3    atorvastatin (LIPITOR) 20 MG tablet, Take 1 tablet by mouth Every Night., Disp: 90 tablet, Rfl: 3    Cholecalciferol (VITAMIN D3) 2000 UNITS capsule, Take 1 capsule by mouth Daily., Disp: , Rfl:     Diclofenac Sodium (VOLTAREN) 1 % gel gel, Apply 4 g topically to the appropriate area as directed 4 (Four) Times a Day As Needed (arthralgia)., Disp: 140 g, Rfl: 2    Estrogens Conjugated (Premarin) 0.625 MG/GM vaginal cream, 1/2 gram, twice weekly at bedtime, Disp: 30 g, Rfl: 5    famotidine (Pepcid) 40 MG tablet, Take 1 tablet by mouth Daily., Disp: 90 tablet, Rfl: 1    ibuprofen (ADVIL,MOTRIN) 800 MG tablet, Take 1 tablet by mouth As Needed for Mild Pain., Disp: 90 tablet, Rfl: 3    levothyroxine (SYNTHROID, LEVOTHROID) 100 MCG tablet, TAKE 1 TABLET DAILY., Disp: 90 tablet, Rfl: 3    lisinopril (PRINIVIL,ZESTRIL) 10 MG tablet, TAKE ONE TABLET BY MOUTH DAILY, Disp: 90 tablet, Rfl: 3    spironolactone (ALDACTONE) 25 MG tablet, Take 1 tablet by mouth Daily., Disp: 90 tablet, Rfl: 3    valACYclovir (VALTREX) 1000 MG tablet, Take 1 tablet by mouth 2 (Two) Times a Day., Disp: 30 tablet, Rfl: 2    Objective     Vital Signs   /62   Pulse 83   Ht 160 cm (63\")   Wt 72.6 kg (160 lb)   LMP  (LMP Unknown)   SpO2 98%   BMI 28.34 kg/m²      Physical Exam:  General appearance - alert, well appearing, and in no distress  Mental status - alert, oriented to person, place, and time, normal mood, behavior, speech, dress, motor activity, and thought processes  Eyes - sclera anicteric  Neck - supple, no significant adenopathy  Chest - no tachypnea, retractions or cyanosis  Heart - normal rate and regular rhythm  Breasts - breasts appear normal, no suspicious masses, no skin or nipple changes or axillary nodes, erythematous 1 cm skin lesion on L breast at 11 o'clock position, most " consistent with an SK lesion  Neurological - alert, oriented, normal speech, no focal findings or movement disorder noted  Extremities - no pedal edema noted  Skin - see breast exam     Results Review:  Result Review :            MAMMO Scan (05/23/2024 00:00)   BIRADS 2     Assessment & Plan       Diagnoses and all orders for this visit:    1. Encounter for screening mammogram for breast cancer (Primary)     Ms. Fletcher is a 70 y/o female who presents to the clinic for yearly breast follow up. She is 7 years s/p L breast benign breast biopsy. I discussed the option of returning to her PCP for yearly management of her mammograms. She is agreeable to the this. In regards to the skin lesion, I have advised that she talk to her dermatologist about this at her next appointment. It does not have concerning features at this time that I would advise an earlier appointment. She will call for an appointment if she has any new problems or concerns. She voices understanding and is agreeable to the plan.     Follow up:       Return if symptoms worsen or fail to improve.        kAilah Beltran PA-C  06/05/24  13:12 CDT

## 2024-06-06 NOTE — TELEPHONE ENCOUNTER
Attempted to reach pt re: meds being sent to her pharmacy. Let message for pt to return call to office

## 2024-06-06 NOTE — TELEPHONE ENCOUNTER
Called and spoke with pt and she was notified of meds called to pharmacy.  Will start atorvastatin for hyperlipidemia.

## 2024-06-06 NOTE — TELEPHONE ENCOUNTER
Caller: Keshia Fletcher    Relationship: Self    Best call back number: [phone]    Who are you requesting to speak with (clinical staff, provider,  specific staff member): CLINICAL    Do you know the name of the person who called: CAM    What was the call regarding: MEDICATION REQUEST    PATIENT RETURNED CALL - HUB UNABLE TO WARM TRANSFER CALL TO PRACTICE

## 2024-06-17 RX ORDER — FAMOTIDINE 40 MG/1
40 TABLET, FILM COATED ORAL DAILY
Qty: 90 TABLET | Refills: 0 | Status: SHIPPED | OUTPATIENT
Start: 2024-06-17

## 2024-07-03 RX ORDER — SPIRONOLACTONE 25 MG/1
25 TABLET ORAL DAILY
Qty: 90 TABLET | Refills: 3 | Status: SHIPPED | OUTPATIENT
Start: 2024-07-03

## 2024-08-05 DIAGNOSIS — I10 BENIGN HYPERTENSION: ICD-10-CM

## 2024-08-05 RX ORDER — AMLODIPINE BESYLATE 10 MG/1
10 TABLET ORAL DAILY
Qty: 90 TABLET | Refills: 0 | OUTPATIENT
Start: 2024-08-05

## 2024-09-17 RX ORDER — FAMOTIDINE 40 MG/1
40 TABLET, FILM COATED ORAL DAILY
Qty: 90 TABLET | Refills: 3 | Status: SHIPPED | OUTPATIENT
Start: 2024-09-17

## 2024-10-29 DIAGNOSIS — I10 BENIGN HYPERTENSION: ICD-10-CM

## 2024-10-29 RX ORDER — AMLODIPINE BESYLATE 10 MG/1
10 TABLET ORAL DAILY
Qty: 90 TABLET | Refills: 3 | Status: SHIPPED | OUTPATIENT
Start: 2024-10-29

## 2024-11-14 DIAGNOSIS — I10 BENIGN HYPERTENSION: ICD-10-CM

## 2024-11-14 RX ORDER — LISINOPRIL 10 MG/1
10 TABLET ORAL DAILY
Qty: 90 TABLET | Refills: 3 | Status: SHIPPED | OUTPATIENT
Start: 2024-11-14

## 2024-11-14 NOTE — TELEPHONE ENCOUNTER
Caller: Keshia Fletcher SARITA    Relationship: Self    Best call back number: [phone]     Requested Prescriptions:   Requested Prescriptions     Pending Prescriptions Disp Refills    lisinopril (PRINIVIL,ZESTRIL) 10 MG tablet 90 tablet 3     Sig: Take 1 tablet by mouth Daily.        Pharmacy where request should be sent: [address] [phone] [facility] [phone] [facility]     Last office visit with prescribing clinician: 5/29/2024   Last telemedicine visit with prescribing clinician: Visit date not found   Next office visit with prescribing clinician: 12/2/2024     Additional details provided by patient: PATIENT IS TAKING LAST PILL TODAY    Does the patient have less than a 3 day supply:  [x] Yes  [] No    Would you like a call back once the refill request has been completed: [x] Yes [] No    If the office needs to give you a call back, can they leave a voicemail: [x] Yes [] No    Bailey Boston Rep   11/14/24 09:29 CST

## 2024-11-22 DIAGNOSIS — E78.00 PURE HYPERCHOLESTEROLEMIA: ICD-10-CM

## 2024-11-22 RX ORDER — ATORVASTATIN CALCIUM 20 MG/1
20 TABLET, FILM COATED ORAL NIGHTLY
Qty: 90 TABLET | Refills: 3 | Status: SHIPPED | OUTPATIENT
Start: 2024-11-22

## 2024-12-02 ENCOUNTER — OFFICE VISIT (OUTPATIENT)
Dept: INTERNAL MEDICINE | Facility: CLINIC | Age: 69
End: 2024-12-02
Payer: MEDICARE

## 2024-12-02 VITALS
HEART RATE: 90 BPM | WEIGHT: 157 LBS | DIASTOLIC BLOOD PRESSURE: 70 MMHG | TEMPERATURE: 97.4 F | OXYGEN SATURATION: 98 % | SYSTOLIC BLOOD PRESSURE: 114 MMHG | BODY MASS INDEX: 27.82 KG/M2 | HEIGHT: 63 IN

## 2024-12-02 DIAGNOSIS — E55.9 VITAMIN D DEFICIENCY: Primary | ICD-10-CM

## 2024-12-02 DIAGNOSIS — E03.9 ACQUIRED HYPOTHYROIDISM: ICD-10-CM

## 2024-12-02 DIAGNOSIS — I10 BENIGN HYPERTENSION: ICD-10-CM

## 2024-12-02 DIAGNOSIS — E78.00 PURE HYPERCHOLESTEROLEMIA: ICD-10-CM

## 2024-12-02 DIAGNOSIS — M62.830 BACK SPASM: ICD-10-CM

## 2024-12-02 DIAGNOSIS — Z23 FLU VACCINE NEED: ICD-10-CM

## 2024-12-02 PROCEDURE — 1126F AMNT PAIN NOTED NONE PRSNT: CPT | Performed by: INTERNAL MEDICINE

## 2024-12-02 PROCEDURE — 3074F SYST BP LT 130 MM HG: CPT | Performed by: INTERNAL MEDICINE

## 2024-12-02 PROCEDURE — 99214 OFFICE O/P EST MOD 30 MIN: CPT | Performed by: INTERNAL MEDICINE

## 2024-12-02 PROCEDURE — G0008 ADMIN INFLUENZA VIRUS VAC: HCPCS | Performed by: INTERNAL MEDICINE

## 2024-12-02 PROCEDURE — 90662 IIV NO PRSV INCREASED AG IM: CPT | Performed by: INTERNAL MEDICINE

## 2024-12-02 PROCEDURE — 3078F DIAST BP <80 MM HG: CPT | Performed by: INTERNAL MEDICINE

## 2024-12-02 RX ORDER — CYCLOBENZAPRINE HCL 5 MG
5 TABLET ORAL 3 TIMES DAILY PRN
Qty: 90 TABLET | Refills: 2 | Status: SHIPPED | OUTPATIENT
Start: 2024-12-02

## 2024-12-02 NOTE — PROGRESS NOTES
"      Chief Complaint  Hypertension (6 month follow up ) and Back Pain (Had right side back spasms over Thanksgiving- wonders about getting some muscle relaxer )    Subjective        Keshia Fletcher presents to [facility]    HPI    Patient here for the above problems.  See Assessment and Plan for further HPI components.      Review of Systems    Objective   Vital Signs:  /70 (BP Location: Left arm, Patient Position: Sitting, Cuff Size: Adult)   Pulse 90   Temp 97.4 °F (36.3 °C) (Temporal)   Ht 160 cm (63\")   Wt 71.2 kg (157 lb)   SpO2 98%   BMI 27.81 kg/m²   Estimated body mass index is 27.81 kg/m² as calculated from the following:    Height as of this encounter: 160 cm (63\").    Weight as of this encounter: 71.2 kg (157 lb).      Physical Exam  Vitals and nursing note reviewed.   Constitutional:       Appearance: She is not ill-appearing.   Eyes:      General: No scleral icterus.     Conjunctiva/sclera: Conjunctivae normal.   Cardiovascular:      Rate and Rhythm: Normal rate and regular rhythm.   Pulmonary:      Effort: Pulmonary effort is normal. No respiratory distress.   Skin:     General: Skin is warm.      Coloration: Skin is not pale.   Neurological:      General: No focal deficit present.      Mental Status: She is alert and oriented to person, place, and time.   Psychiatric:         Mood and Affect: Mood normal.         Behavior: Behavior normal.                       Assessment and Plan   Diagnoses and all orders for this visit:    1. Vitamin D deficiency (Primary)  -     Vitamin D,25-Hydroxy; Future    2. Flu vaccine need  -     Fluzone High-Dose 65+yrs    3. Acquired hypothyroidism  -     TSH Rfx On Abnormal To Free T4; Future    4. Benign hypertension  -     CBC & Differential; Future  -     Comprehensive Metabolic Panel; Future  -     Urinalysis With Microscopic - Urine, Clean Catch; Future    5. Pure hypercholesterolemia  -     Lipid Panel; Future  -     TSH Rfx " On Abnormal To Free T4; Future    6. Back spasm  -     cyclobenzaprine (FLEXERIL) 5 MG tablet; Take 1 tablet by mouth 3 (Three) Times a Day As Needed for Muscle Spasms.  Dispense: 90 tablet; Refill: 2        History of Present Illness  The patient is a 69-year-old female who presents today for follow-up on blood pressure and hyperlipidemia.    She does not monitor her blood pressure at home.    She experienced back spasms last Wednesday, which she attributes to the physical strain of cooking during Thanksgiving. The pain was severe, but was alleviated by a pain medication and a muscle relaxer she had on hand. She also found relief from a massage. She has been prescribed ibuprofen 800 mg in the past, but tries to limit its use.      Assessment & Plan  1. Hypertension.  Her blood pressure is well controlled. Continue the present doses of amlodipine 10 mg, lisinopril 10 mg, and spironolactone 25 mg.    2. Hyperlipidemia.  She is on Lipitor 20 mg. It is recommended to take this nightly. Lipids will be checked at the next visit, which will be during her Medicare wellness visit.    3. Hypothyroidism.  She is on levothyroxine 100 mcg. Continue the current dose and check TSH at her next visit. Labs will be done prior to the next visit.    4. Back spasm.  She experienced severe back spasms after Thanksgiving, likely due to increased physical activity. A prescription for Flexeril (cyclobenzaprine) 5 mg will be provided, to be taken up to three times a day as needed. She is advised to avoid repetitive bad movements and consider using a foam roller or a tightly rolled towel to stretch the muscles. If the spasms persist, she should try to avoid ibuprofen and Aleve to prevent kidney function issues.        Labs as above prior to next visit.      Result Review :           BMI is >= 25 and <30. (Overweight) The following options were offered after discussion;: exercise counseling/recommendations and nutrition  counseling/recommendations                  Follow Up   Return in about 6 months (around 6/2/2025), or if symptoms worsen or fail to improve, for follow up for above problems. [facility] care., Medicare Wellness - Labs prior to visit.  Patient was given instructions and counseling regarding her condition or for health maintenance advice. Please see specific information pulled into the AVS if appropriate.       PATY Monique MD, FACP, FH      Electronically signed by Kervin Monique MD, 12/02/24, 8:35 AM CST.    Patient or patient representative verbalized consent for the use of Ambient Listening during the visit with  Kervin Monique MD for chart documentation. 12/2/2024  11:59 CST

## 2025-01-21 ENCOUNTER — OFFICE VISIT (OUTPATIENT)
Dept: OBSTETRICS AND GYNECOLOGY | Age: 70
End: 2025-01-21
Payer: MEDICARE

## 2025-01-21 VITALS
DIASTOLIC BLOOD PRESSURE: 76 MMHG | BODY MASS INDEX: 27.29 KG/M2 | SYSTOLIC BLOOD PRESSURE: 140 MMHG | HEIGHT: 63 IN | WEIGHT: 154 LBS

## 2025-01-21 DIAGNOSIS — M85.80 OSTEOPENIA, SENILE: ICD-10-CM

## 2025-01-21 DIAGNOSIS — Z12.31 BREAST CANCER SCREENING BY MAMMOGRAM: ICD-10-CM

## 2025-01-21 DIAGNOSIS — N39.3 SUI (STRESS URINARY INCONTINENCE, FEMALE): ICD-10-CM

## 2025-01-21 DIAGNOSIS — Z12.31 SCREENING MAMMOGRAM, ENCOUNTER FOR: ICD-10-CM

## 2025-01-21 DIAGNOSIS — R06.83 SNORING: ICD-10-CM

## 2025-01-21 DIAGNOSIS — Z78.0 MENOPAUSE: ICD-10-CM

## 2025-01-21 DIAGNOSIS — Z72.820 POOR SLEEP: ICD-10-CM

## 2025-01-21 DIAGNOSIS — Z01.419 ENCOUNTER FOR GYNECOLOGICAL EXAMINATION WITHOUT ABNORMAL FINDING: Primary | ICD-10-CM

## 2025-01-21 NOTE — PROGRESS NOTES
"Subjective   Chief Complaint   Patient presents with    Annual Exam     Pt is here for an annual exam  Last pap 19 ASCUS negative HPV   Last mammogram 24([facility]) benign  Last dexa 23([facility]) Osteopenia   Pt has no complaints today      Keshia Fletcher is a 69 y.o. year old  menopausal female presenting to be seen for her annual exam.  Overall, the patient reports to be feeling \"pretty good\", except for some recent family \"things\".  The patient denies any vaginal bleeding in the past 12 months  Hot flashes and night sweats are not a problem any longer.    She is sexually active, although intimacy no longer includes penetration    Patient reports regular self breast exams: yes.  Had normal mammogram last May  Osteopenia on DEXA  in ,  and   She exercises regularly: yes.  Walks every morning when weather permits  She has noticed changes in height: no.    No Additional Complaints Reported    The following portions of the patient's history were reviewed and updated as appropriate:problem list, current medications, allergies, past family history, past medical history, past social history, and past surgical history.    Social History    Tobacco Use      Smoking status: Never      Smokeless tobacco: Never    Past Medical History:   Diagnosis Date    Acid reflux     Arthritis     Heart murmur     Hx of colonic polyp     Hypertension     Hypothyroidism       Past Surgical History:   Procedure Laterality Date    APPENDECTOMY  2007    BREAST BIOPSY Left 2017    Procedure: LEFT ULTRASOUND GUIDED NEEDLE DIRECT EXCISIONAL BIOPSY, MAMMOGRAM TO FOLLOW, BREAST;  Surgeon: Dianne Honeycutt MD;  Location: [facility] OR;  Service:     COLONOSCOPY N/A 2021    Sessille serrated adenoma at 40 cm, tubular adenoma at 30 cm repeat exam in 3 years    COLONOSCOPY N/A 2024    Procedure: COLONOSCOPY WITH ANESTHESIA;  Surgeon: Tomas Ervin MD;  Location: [facility] ENDOSCOPY;  Service: " Gastroenterology;  Laterality: N/A;  pre: hx polyps  post:polyp  oliva    COLONOSCOPY W/ POLYPECTOMY  04/16/2015    2 Hyperplastic polyps at 30 cm repeat exam in 5 years    DILATION AND CURETTAGE, DIAGNOSTIC / THERAPEUTIC  1992, 2016    X 2     ENDOSCOPY N/A 08/24/2021    - Normal examined duodenum. - Hiatal hernia. - Benign-appearing esophageal stenosis. Dilated. - No specimens collected    ENDOSCOPY N/A 09/14/2021    Normal examined duodenum. - Gastritis. Biopsied. - Hiatal hernia. - Z-line regular, 40 cm from the incisors. Dilated-neg for hpylori    ENDOSCOPY N/A 5/22/2024    Procedure: ESOPHAGOGASTRODUODENOSCOPY WITH ANESTHESIA;  Surgeon: Tomas Ervin MD;  Location: [facility] ENDOSCOPY;  Service: Gastroenterology;  Laterality: N/A;  pre: reflux  post: dilated esophagus   oliva    KNEE ARTHROSCOPY W/ MENISCAL REPAIR Bilateral     LASIK Right       Review of Systems   Constitutional:  Negative for activity change and unexpected weight change.   HENT:  Negative for congestion.    Respiratory:  Negative for chest tightness and shortness of breath.    Cardiovascular:  Negative for chest pain.   Gastrointestinal:  Negative for abdominal pain, blood in stool, constipation and diarrhea.        Polyps on colonoscopy in May 2024, due again in three years   Endocrine: Negative for cold intolerance and heat intolerance.   Genitourinary:  Positive for dyspareunia (it would hurt, their intimacy does not involve penetration any longer) and enuresis (GEOVANNI with sneezing, coughing or laughing.). Negative for difficulty urinating, pelvic pain, vaginal bleeding, vaginal discharge and vaginal pain.   Musculoskeletal:  Positive for arthralgias. Negative for back pain.   Skin:  Negative for rash.   Neurological:  Negative for dizziness and headaches.   Psychiatric/Behavioral:  Positive for sleep disturbance (sometimes has trouble going to sleep.   reports that she snores terribly.). Negative for dysphoric mood. The patient  "is not nervous/anxious.          Objective   /76   Ht 160 cm (63\")   Wt 69.9 kg (154 lb)   LMP  (LMP Unknown)   BMI 27.28 kg/m²   Physical Exam  Vitals and nursing note reviewed. Exam conducted with a chaperone present.   Constitutional:       General: She is not in acute distress.     Appearance: Normal appearance. She is well-developed and normal weight.   HENT:      Head: Normocephalic and atraumatic.   Neck:      Thyroid: No thyromegaly.   Cardiovascular:      Rate and Rhythm: Normal rate and regular rhythm.      Heart sounds: No murmur heard.  Pulmonary:      Effort: Pulmonary effort is normal.      Breath sounds: Normal breath sounds.   Chest:   Breasts:     Right: No inverted nipple or mass.      Left: No inverted nipple or mass.   Abdominal:      General: There is no distension.      Palpations: Abdomen is soft.      Tenderness: There is no abdominal tenderness.   Genitourinary:     General: Normal vulva.      Labia:         Right: No tenderness or lesion.         Left: No tenderness or lesion.       Vagina: Normal. No vaginal discharge or bleeding.      Cervix: No cervical motion tenderness or discharge.      Adnexa:         Right: No tenderness or fullness.          Left: No tenderness or fullness.        Rectum: Normal. No external hemorrhoid or internal hemorrhoid. Normal anal tone.      Comments: Labia normal, no lesions noted.  Urethral meatus unremarkable, no prolapse of mucosa.  Anus/perineum normal.  Cervix normal in appearance.  Speculum exam without any prolapse.  Vaginal mucosa pale, with loss of rugae.  Small speculum required  Musculoskeletal:         General: Normal range of motion.      Cervical back: Normal range of motion and neck supple.   Skin:     General: Skin is warm and dry.   Neurological:      Mental Status: She is alert and oriented to person, place, and time.   Psychiatric:         Mood and Affect: Mood normal.         Behavior: Behavior normal.         Thought Content: " Thought content normal.         Judgment: Judgment normal.            Assessment & Plan    Diagnoses and all orders for this visit:    1. Encounter for gynecological examination without abnormal finding (Primary): Exam unremarkable.  Small speculum required.  Self breast exam reported.  Mammogram and DEXA orders placed.  Routine screening labs with PCP.  Colonoscopy screening is up-to-date; patient had polyps and is due for repeat on a 3-year cycle.  Patient with stress urinary incontinence with coughing, sneezing, and laughing, but is not bothered enough to do anything at this time.  We did discuss surgery and physical therapy as options, but the patient is currently interested in either.  The patient has poor sleep quality, and her  reports that she snores very loudly; patient agreeable to sleep medicine referral, but open was placed.    2. Screening mammogram, encounter for  -     Mammo Screening Digital Tomosynthesis Bilateral With CAD; Future    3. GEOVANNI (stress urinary incontinence, female)    4. Snoring  -     Ambulatory Referral to Sleep Medicine    5. Poor sleep  -     Ambulatory Referral to Sleep Medicine    6. Breast cancer screening by mammogram  -     Mammo Screening Digital Tomosynthesis Bilateral With CAD; Future    7. Osteopenia, senile  -     DEXA Bone Density Axial; Future    8. Menopause  -     Mammo Screening Digital Tomosynthesis Bilateral With CAD; Future  -     DEXA Bone Density Axial; Future         This note was electronically signed.    Gillian Goff MD  1/21/2025  10:19 CST

## 2025-04-30 RX ORDER — LEVOTHYROXINE SODIUM 100 UG/1
100 TABLET ORAL DAILY
Qty: 90 TABLET | Refills: 3 | Status: SHIPPED | OUTPATIENT
Start: 2025-04-30

## 2025-05-07 ENCOUNTER — OFFICE VISIT (OUTPATIENT)
Dept: INTERNAL MEDICINE | Facility: CLINIC | Age: 70
End: 2025-05-07
Payer: MEDICARE

## 2025-05-07 VITALS
HEART RATE: 76 BPM | DIASTOLIC BLOOD PRESSURE: 68 MMHG | HEIGHT: 63 IN | TEMPERATURE: 97.8 F | SYSTOLIC BLOOD PRESSURE: 124 MMHG | BODY MASS INDEX: 27.29 KG/M2 | OXYGEN SATURATION: 96 % | WEIGHT: 154 LBS

## 2025-05-07 DIAGNOSIS — I10 BENIGN HYPERTENSION: ICD-10-CM

## 2025-05-07 DIAGNOSIS — E78.00 PURE HYPERCHOLESTEROLEMIA: ICD-10-CM

## 2025-05-07 DIAGNOSIS — E55.9 VITAMIN D DEFICIENCY: ICD-10-CM

## 2025-05-07 DIAGNOSIS — J32.9 SINUSITIS, UNSPECIFIED CHRONICITY, UNSPECIFIED LOCATION: ICD-10-CM

## 2025-05-07 DIAGNOSIS — E03.9 ACQUIRED HYPOTHYROIDISM: ICD-10-CM

## 2025-05-07 DIAGNOSIS — Z00.00 MEDICARE ANNUAL WELLNESS VISIT, SUBSEQUENT: Primary | ICD-10-CM

## 2025-05-07 DIAGNOSIS — E66.3 OVERWEIGHT (BMI 25.0-29.9): ICD-10-CM

## 2025-05-07 RX ORDER — METHYLPREDNISOLONE SODIUM SUCCINATE 40 MG/ML
40 INJECTION INTRAMUSCULAR; INTRAVENOUS ONCE
Status: DISCONTINUED | OUTPATIENT
Start: 2025-05-07 | End: 2025-05-07

## 2025-05-07 RX ORDER — METHYLPREDNISOLONE SODIUM SUCCINATE 40 MG/ML
40 INJECTION INTRAMUSCULAR; INTRAVENOUS ONCE
Status: COMPLETED | OUTPATIENT
Start: 2025-05-07 | End: 2025-05-07

## 2025-05-07 RX ADMIN — METHYLPREDNISOLONE SODIUM SUCCINATE 40 MG: 40 INJECTION INTRAMUSCULAR; INTRAVENOUS at 09:06

## 2025-05-07 NOTE — PROGRESS NOTES
Subjective   The ABCs of the Annual Wellness Visit  Medicare Wellness Visit      Keshia Fletcher is a 70 y.o. patient who presents for a Medicare Wellness Visit.    The following portions of the patient's history were reviewed and   updated as appropriate: allergies, current medications, past family history, past medical history, past social history, past surgical history, and problem list.    Compared to one year ago, the patient's physical   health is the same.  Compared to one year ago, the patient's mental   health is the same.    Recent Hospitalizations:  She was not admitted to the hospital during the last year.     Current Medical Providers:  Patient Care Team:  Kervin Monique MD as PCP - General (Internal Medicine)  Tomas Ervin MD as Consulting Physician (Gastroenterology)  Silva Wallace MD as Consulting Physician (General Surgery)    Outpatient Medications Prior to Visit   Medication Sig Dispense Refill    amLODIPine (NORVASC) 10 MG tablet TAKE ONE TABLET BY MOUTH DAILY 90 tablet 3    atorvastatin (LIPITOR) 20 MG tablet Take 1 tablet by mouth Every Night. 90 tablet 3    Cholecalciferol (VITAMIN D3) 2000 UNITS capsule Take 1 capsule by mouth Daily.      cyclobenzaprine (FLEXERIL) 5 MG tablet Take 1 tablet by mouth 3 (Three) Times a Day As Needed for Muscle Spasms. 90 tablet 2    Diclofenac Sodium (VOLTAREN) 1 % gel gel Apply 4 g topically to the appropriate area as directed 4 (Four) Times a Day As Needed (arthralgia). 140 g 2    famotidine (PEPCID) 40 MG tablet Take 1 tablet by mouth Daily. 90 tablet 3    ibuprofen (ADVIL,MOTRIN) 800 MG tablet Take 1 tablet by mouth As Needed for Mild Pain. 90 tablet 3    levothyroxine (SYNTHROID, LEVOTHROID) 100 MCG tablet TAKE 1 TABLET DAILY. 90 tablet 3    lisinopril (PRINIVIL,ZESTRIL) 10 MG tablet Take 1 tablet by mouth Daily. 90 tablet 3    spironolactone (ALDACTONE) 25 MG tablet TAKE ONE TABLET BY MOUTH DAILY 90 tablet 3    valACYclovir (VALTREX)  "1000 MG tablet Take 1 tablet by mouth 2 (Two) Times a Day. 30 tablet 2    methylPREDNISolone (MEDROL) 4 MG dose pack Take as directed on package instructions. (Patient not taking: Reported on 5/7/2025) 21 tablet 0     No facility-administered medications prior to visit.     No opioid medication identified on active medication list. I have reviewed chart for other potential  high risk medication/s and harmful drug interactions in the elderly.      Aspirin is not on active medication list.  Aspirin use is not indicated based on review of current medical condition/s. Risk of harm outweighs potential benefits.  .    Patient Active Problem List   Diagnosis    Alkaline phosphatase elevation    Acquired kyphosis    Pure hypercholesterolemia    Gastroesophageal reflux disease without esophagitis    Goiter, non-toxic    Mild tricuspid insufficiency    Myxedema heart disease    Osteopenia    Rheumatoid arthritis    Systolic murmur    Benign hypertension    Vitamin D deficiency    TMJ crepitus    Hypothyroidism    Overweight (BMI 25.0-29.9)    Dizziness    Hx of adenomatous colonic polyps    Renal insufficiency     Advance Care Planning Advance Directive is not on file.  ACP discussion was held with the patient during this visit. Patient does not have an advance directive, information provided.            Objective   Vitals:    05/07/25 0839   BP: 124/68   BP Location: Left arm   Patient Position: Sitting   Cuff Size: Adult   Pulse: 76   Temp: 97.8 °F (36.6 °C)   TempSrc: Temporal   SpO2: 96%   Weight: 69.9 kg (154 lb)   Height: 160 cm (63\")   PainSc: 0-No pain       Estimated body mass index is 27.28 kg/m² as calculated from the following:    Height as of this encounter: 160 cm (63\").    Weight as of this encounter: 69.9 kg (154 lb).                Does the patient have evidence of cognitive impairment? No                                                                                               Health  Risk " Assessment    Smoking Status:  Social History     Tobacco Use   Smoking Status Never   Smokeless Tobacco Never     Alcohol Consumption:  Social History     Substance and Sexual Activity   Alcohol Use Yes    Alcohol/week: 1.0 standard drink of alcohol    Types: 1 Drinks containing 0.5 oz of alcohol per week    Comment: rare       Fall Risk Screen  STEADI Fall Risk Assessment was completed, and patient is at LOW risk for falls.Assessment completed on:2025    Depression Screening   Little interest or pleasure in doing things? Not at all   Feeling down, depressed, or hopeless? Not at all   PHQ-2 Total Score 0      Health Habits and Functional and Cognitive Screenin/7/2025     8:43 AM   Functional & Cognitive Status   Do you have difficulty preparing food and eating? No   Do you have difficulty bathing yourself, getting dressed or grooming yourself? No   Do you have difficulty using the toilet? No   Do you have difficulty moving around from place to place? No   Do you have trouble with steps or getting out of a bed or a chair? No   Current Diet Well Balanced Diet   Dental Exam Up to date   Eye Exam Up to date   Exercise (times per week) 3 times per week   Current Exercises Include Walking   Do you need help using the phone?  No   Are you deaf or do you have serious difficulty hearing?  No   Do you need help to go to places out of walking distance? No   Do you need help shopping? No   Do you need help preparing meals?  No   Do you need help with housework?  No   Do you need help with laundry? No   Do you need help taking your medications? No   Do you need help managing money? No   Do you ever drive or ride in a car without wearing a seat belt? No   Have you felt unusual stress, anger or loneliness in the last month? No   Who do you live with? Spouse   If you need help, do you have trouble finding someone available to you? No   Have you been bothered in the last four weeks by sexual problems? No   Do you have  difficulty concentrating, remembering or making decisions? No           Age-appropriate Screening Schedule:  Refer to the list below for future screening recommendations based on patient's age, sex and/or medical conditions. Orders for these recommended tests are listed in the plan section. The patient has been provided with a written plan.    Health Maintenance List  Health Maintenance   Topic Date Due    COVID-19 Vaccine (4 - 2024-25 season) 09/01/2024    DXA SCAN  05/22/2025    LIPID PANEL  05/23/2025    ZOSTER VACCINE (3 of 3) 05/27/2025    INFLUENZA VACCINE  07/01/2025    ANNUAL WELLNESS VISIT  05/07/2026    MAMMOGRAM  05/23/2026    TDAP/TD VACCINES (2 - Td or Tdap) 03/13/2027    COLORECTAL CANCER SCREENING  05/22/2029    HEPATITIS C SCREENING  Completed    Pneumococcal Vaccine 50+  Completed                                                                                                                                                 CMS Preventative Services Quick Reference  Risk Factors Identified During Encounter  Immunizations Discussed/Encouraged: Shingrix  Dental Screening Recommended  Vision Screening Recommended    The above risks/problems have been discussed with the patient.  Pertinent information has been shared with the patient in the After Visit Summary.  An After Visit Summary and PPPS were made available to the patient.    Follow Up:   Next Medicare Wellness visit to be scheduled in 1 year.        Diagnoses and all orders for this visit:    1. Medicare annual wellness visit, subsequent (Primary)    2. Sinusitis, unspecified chronicity, unspecified location  -     Discontinue: methylPREDNISolone sodium succinate (SOLU-Medrol) injection 40 mg  -     methylPREDNISolone sodium succinate (SOLU-Medrol) injection 40 mg    3. Benign hypertension    4. Pure hypercholesterolemia    5. Overweight (BMI 25.0-29.9)    6. Acquired hypothyroidism      Recommend at least annual dental and vision  "screening.  Recommend annual influenza vaccination  Recommend a varied diet and appropriate portion sizes.   CDC recommendations for physical activity:  At least 150 minutes a week (for example, 30 minutes a day, 5 days a week) of moderate-intensity activity such as brisk walking. Or can consider 75 minutes a week of vigorous-intensity activity such as hiking, jogging, or running.  At least 2 days a week of activities that strengthen muscles.  Plus activities to improve balance.    Health Maintenance Due   Topic Date Due    COVID-19 Vaccine (4 - 2024-25 season) 09/01/2024    DXA SCAN  05/22/2025    LIPID PANEL  05/23/2025           Result Review :  The following data was reviewed by: Kervin Monique MD on 05/07/2025:         Follow Up:   Return in about 6 months (around 11/7/2025), or if symptoms worsen or fail to improve, for follow up for above problems. Longitudinal care..     An After Visit Summary and PPPS were made available to the patient.                   PATY Monique MD, FACP, FHM      Electronically signed by Kervin Monique MD, 05/07/25, 8:55 AM CDT.    Additional E&M Note during same encounter follows:  Patient has additional, significant, and separately identifiable condition(s)/problem(s) that require work above and beyond the Medicare Wellness Visit       Chief Complaint  Medicare Wellness-subsequent (Do labs before leaving ) and Headache    Subjective        HPI  Keshia Fletcher is also being seen today for sinus headache  Patient here for the above problems.  See Assessment and Plan for further HPI components.        Objective   Vitals:    05/07/25 0839   BP: 124/68   BP Location: Left arm   Patient Position: Sitting   Cuff Size: Adult   Pulse: 76   Temp: 97.8 °F (36.6 °C)   TempSrc: Temporal   SpO2: 96%   Weight: 69.9 kg (154 lb)   Height: 160 cm (63\")   PainSc: 0-No pain     Physical Exam  Vitals and nursing note reviewed.   Constitutional:       Appearance: She is not ill-appearing. "   Eyes:      General: No scleral icterus.     Conjunctiva/sclera: Conjunctivae normal.   Cardiovascular:      Rate and Rhythm: Normal rate and regular rhythm.      Heart sounds: Murmur heard.   Pulmonary:      Effort: Pulmonary effort is normal. No respiratory distress.   Skin:     General: Skin is warm.      Coloration: Skin is not pale.   Neurological:      General: No focal deficit present.      Mental Status: She is alert and oriented to person, place, and time.   Psychiatric:         Mood and Affect: Mood normal.         Behavior: Behavior normal.              The following data was reviewed by: Kervin Monique MD on 05/07/2025:  CMP          5/23/2024    07:10   CMP   Glucose 97    BUN 13    Creatinine 1.11    EGFR 53.3    Sodium 137    Potassium 4.6    Chloride 105    Calcium 9.2    Total Protein 6.4    Albumin 4.5    Globulin 1.9    Total Bilirubin 0.6    Alkaline Phosphatase 137    AST (SGOT) 20    ALT (SGPT) 20    Albumin/Globulin Ratio 2.4    BUN/Creatinine Ratio 11.7      CBC w/diff          5/23/2024    07:10   CBC w/Diff   WBC 5.11    RBC 4.39    Hemoglobin 13.5    Hematocrit 41.1    MCV 93.6    MCH 30.8    MCHC 32.8    RDW 12.0    Platelets 141    Neutrophil Rel % 65.7    Lymphocyte Rel % 22.7    Monocyte Rel % 8.8    Eosinophil Rel % 1.6    Basophil Rel % 1.0      Lipid Panel          5/23/2024    07:10   Lipid Panel   Total Cholesterol 149    Triglycerides 61    HDL Cholesterol 54    VLDL Cholesterol 12    LDL Cholesterol  83      TSH          5/23/2024    07:10   TSH   TSH 0.648          UA          5/23/2024    07:10 5/29/2024    08:17 5/29/2024    09:04   Urinalysis   Ketones, UA   Negative    Blood, UA Trace  Negative     Leukocytes, UA See below:  Negative  Negative    Nitrite, UA Negative  Negative     RBC, UA 0-2  0-2     Bacteria, UA 1+  Comment                Assessment and Plan   Diagnoses and all orders for this visit:    1. Medicare annual wellness visit, subsequent (Primary)    2.  Sinusitis, unspecified chronicity, unspecified location  -     Discontinue: methylPREDNISolone sodium succinate (SOLU-Medrol) injection 40 mg  -     methylPREDNISolone sodium succinate (SOLU-Medrol) injection 40 mg    3. Benign hypertension    4. Pure hypercholesterolemia    5. Overweight (BMI 25.0-29.9)    6. Acquired hypothyroidism      History of Present Illness  The patient is a 70-year-old female who presents today for follow-up.    She reports experiencing a headache, which she attributes to sinus drainage. The headache has shown signs of improvement. Additionally, she experienced a scratchy throat last night, which escalated to pain this morning but has since subsided. She feels generally congested and wishes to feel better.    Her overall health status remains unchanged compared to the previous year, with no hospital admissions within the same period. She is current with her dental and vision screenings and has an advanced directive in place. She received the first dose of the Shingrix vaccine on 04/01/2025 and plans to receive the second dose between 06/2025 and 10/2025. Her arm was sore following the first dose.    Assessment & Plan  1. Sinus headache.  Symptoms are indicative of a sinus headache, likely due to sinus drainage. Steroid injection will be administered today to alleviate the congestion and associated headache. Discussed the impact of weather shifts and exposure to allergens on sinus issues. Patient agreed to receive the steroid shot before leaving.  Consider flonase.  Consider zyrtec.    2. Health maintenance.  Overall health status remains stable compared to the previous year, with no hospital admissions within the same period. Current with dental and vision screenings and has an advanced directive in place. Up to date on mammogram and Tdap vaccine (last received in 2017). Blood pressure is well-regulated at 124/68, and weight has decreased by 5 pounds since the start of the year. Received  the first dose of the Shingrix vaccine on 04/01/2025 and plans to receive the second dose between 06/01/2025 and 10/01/2025. DEXA scan has been ordered and scheduled. Laboratory tests will be conducted today.  3. Overweight  Patient has a BMI > 25 but < 30.  Recommend focusing on portion control and making healthy diet choices.  Avoid fast food.  Avoid calorie beverages including sweet tea, juice, soft drinks, and alcohol.  Recommend increasing your activity and starting a regular exercise program. Can consider utilizing calorie counting apps such as [employer].    4. Hypertension  Patient has hypertension.  BP is at goal.  The patient's BP goal is < 130/80.  Recommend ambulatory BP monitoring after patient has taken medication.  Recommend varying the times of the blood pressure readings.  Patient is currently on lisinopril 10 mg, amlodipine 10 mg, spironolactone 25 mg.  Recommend we continue current regimen.    Continue to monitor.             Follow Up   Return in about 6 months (around 11/7/2025), or if symptoms worsen or fail to improve, for follow up for above problems. Longitudinal care..  Patient was given instructions and counseling regarding her condition or for health maintenance advice. Please see specific information pulled into the AVS if appropriate.   Patient or patient representative verbalized consent for the use of Ambient Listening during the visit with  Kervin Monique MD for chart documentation. 5/7/2025  09:51 CDT

## 2025-05-08 LAB
25(OH)D3+25(OH)D2 SERPL-MCNC: 58.6 NG/ML (ref 30–100)
ALBUMIN SERPL-MCNC: 4.7 G/DL (ref 3.5–5.2)
ALBUMIN/GLOB SERPL: 2 G/DL
ALP SERPL-CCNC: 154 U/L (ref 39–117)
ALT SERPL-CCNC: 17 U/L (ref 1–33)
APPEARANCE UR: CLEAR
AST SERPL-CCNC: 19 U/L (ref 1–32)
BACTERIA #/AREA URNS HPF: NORMAL /HPF
BASOPHILS # BLD AUTO: 0.04 10*3/MM3 (ref 0–0.2)
BASOPHILS NFR BLD AUTO: 0.5 % (ref 0–1.5)
BILIRUB SERPL-MCNC: 1 MG/DL (ref 0–1.2)
BILIRUB UR QL STRIP: NEGATIVE
BUN SERPL-MCNC: 14 MG/DL (ref 8–23)
BUN/CREAT SERPL: 14.7 (ref 7–25)
CALCIUM SERPL-MCNC: 9.9 MG/DL (ref 8.6–10.5)
CASTS URNS MICRO: NORMAL
CHLORIDE SERPL-SCNC: 104 MMOL/L (ref 98–107)
CHOLEST SERPL-MCNC: 152 MG/DL (ref 0–200)
CO2 SERPL-SCNC: 22.2 MMOL/L (ref 22–29)
COLOR UR: YELLOW
CREAT SERPL-MCNC: 0.95 MG/DL (ref 0.57–1)
EGFRCR SERPLBLD CKD-EPI 2021: 63.8 ML/MIN/1.73
EOSINOPHIL # BLD AUTO: 0.09 10*3/MM3 (ref 0–0.4)
EOSINOPHIL NFR BLD AUTO: 1.2 % (ref 0.3–6.2)
EPI CELLS #/AREA URNS HPF: NORMAL /HPF
ERYTHROCYTE [DISTWIDTH] IN BLOOD BY AUTOMATED COUNT: 12.3 % (ref 12.3–15.4)
GLOBULIN SER CALC-MCNC: 2.4 GM/DL
GLUCOSE SERPL-MCNC: 90 MG/DL (ref 65–99)
GLUCOSE UR QL STRIP: NEGATIVE
HCT VFR BLD AUTO: 41.8 % (ref 34–46.6)
HDLC SERPL-MCNC: 53 MG/DL (ref 40–60)
HGB BLD-MCNC: 13.8 G/DL (ref 12–15.9)
HGB UR QL STRIP: ABNORMAL
IMM GRANULOCYTES # BLD AUTO: 0.03 10*3/MM3 (ref 0–0.05)
IMM GRANULOCYTES NFR BLD AUTO: 0.4 % (ref 0–0.5)
KETONES UR QL STRIP: NEGATIVE
LDLC SERPL CALC-MCNC: 79 MG/DL (ref 0–100)
LEUKOCYTE ESTERASE UR QL STRIP: ABNORMAL
LYMPHOCYTES # BLD AUTO: 0.71 10*3/MM3 (ref 0.7–3.1)
LYMPHOCYTES NFR BLD AUTO: 9.3 % (ref 19.6–45.3)
MCH RBC QN AUTO: 30.1 PG (ref 26.6–33)
MCHC RBC AUTO-ENTMCNC: 33 G/DL (ref 31.5–35.7)
MCV RBC AUTO: 91.3 FL (ref 79–97)
MONOCYTES # BLD AUTO: 0.53 10*3/MM3 (ref 0.1–0.9)
MONOCYTES NFR BLD AUTO: 6.9 % (ref 5–12)
NEUTROPHILS # BLD AUTO: 6.25 10*3/MM3 (ref 1.7–7)
NEUTROPHILS NFR BLD AUTO: 81.7 % (ref 42.7–76)
NITRITE UR QL STRIP: NEGATIVE
PH UR STRIP: 6 [PH] (ref 5–8)
PLATELET # BLD AUTO: 127 10*3/MM3 (ref 140–450)
POTASSIUM SERPL-SCNC: 4.2 MMOL/L (ref 3.5–5.2)
PROT SERPL-MCNC: 7.1 G/DL (ref 6–8.5)
PROT UR QL STRIP: NEGATIVE
RBC # BLD AUTO: 4.58 10*6/MM3 (ref 3.77–5.28)
RBC #/AREA URNS HPF: NORMAL /HPF
SODIUM SERPL-SCNC: 138 MMOL/L (ref 136–145)
SP GR UR STRIP: 1.02 (ref 1–1.03)
T4 FREE SERPL-MCNC: 1.73 NG/DL (ref 0.93–1.7)
TRIGL SERPL-MCNC: 109 MG/DL (ref 0–150)
TSH SERPL DL<=0.005 MIU/L-ACNC: 0.43 UIU/ML (ref 0.27–4.2)
UROBILINOGEN UR STRIP-MCNC: ABNORMAL MG/DL
VLDLC SERPL CALC-MCNC: 20 MG/DL (ref 5–40)
WBC # BLD AUTO: 7.65 10*3/MM3 (ref 3.4–10.8)
WBC #/AREA URNS HPF: NORMAL /HPF

## 2025-05-27 DIAGNOSIS — Z78.0 MENOPAUSE: ICD-10-CM

## 2025-05-27 DIAGNOSIS — Z12.31 BREAST CANCER SCREENING BY MAMMOGRAM: ICD-10-CM

## 2025-05-28 DIAGNOSIS — Z78.0 MENOPAUSE: ICD-10-CM

## 2025-05-28 DIAGNOSIS — M85.80 OSTEOPENIA, SENILE: ICD-10-CM

## 2025-06-02 LAB
NCCN CRITERIA FLAG: ABNORMAL
TYRER CUZICK SCORE: 5.8

## 2025-06-03 ENCOUNTER — HOSPITAL ENCOUNTER (OUTPATIENT)
Dept: MAMMOGRAPHY | Facility: HOSPITAL | Age: 70
Discharge: HOME | End: 2025-06-03
Admitting: OBSTETRICS & GYNECOLOGY
Payer: MEDICARE

## 2025-06-03 DIAGNOSIS — R92.8 ABNORMAL MAMMOGRAM: ICD-10-CM

## 2025-06-03 PROCEDURE — 77065 DX MAMMO INCL CAD UNI: CPT

## 2025-06-03 PROCEDURE — G0279 TOMOSYNTHESIS, MAMMO: HCPCS

## 2025-06-04 ENCOUNTER — RESULTS FOLLOW-UP (OUTPATIENT)
Facility: HOSPITAL | Age: 70
End: 2025-06-04
Payer: MEDICARE

## 2025-06-09 ENCOUNTER — DOCUMENTATION (OUTPATIENT)
Dept: GENETICS | Facility: HOSPITAL | Age: 70
End: 2025-06-09
Payer: MEDICARE

## 2025-06-09 NOTE — PROGRESS NOTES
This patient recently completed the CARE risk assessment for a mammogram appointment. Based on the patient's responses, NCCN criteria for genetic testing was met. At the time of the assessment, the patient was provided with both written and video educational materials regarding genetic testing.    Navigator follow-up: The patient received [employer] messaging with my contact information, but at this time has not responded.

## 2025-06-13 RX ORDER — FAMOTIDINE 40 MG/1
40 TABLET, FILM COATED ORAL DAILY
Qty: 90 TABLET | Refills: 0 | OUTPATIENT
Start: 2025-06-13

## 2025-07-02 RX ORDER — FAMOTIDINE 40 MG/1
40 TABLET, FILM COATED ORAL DAILY
Qty: 90 TABLET | Refills: 3 | Status: SHIPPED | OUTPATIENT
Start: 2025-07-02

## 2025-07-02 RX ORDER — SPIRONOLACTONE 25 MG/1
25 TABLET ORAL DAILY
Qty: 90 TABLET | Refills: 3 | Status: SHIPPED | OUTPATIENT
Start: 2025-07-02

## 2025-07-08 ENCOUNTER — OFFICE VISIT (OUTPATIENT)
Dept: NEUROLOGY | Age: 70
End: 2025-07-08
Payer: MEDICARE

## 2025-07-08 VITALS
HEART RATE: 66 BPM | SYSTOLIC BLOOD PRESSURE: 132 MMHG | RESPIRATION RATE: 16 BRPM | BODY MASS INDEX: 28.17 KG/M2 | OXYGEN SATURATION: 98 % | DIASTOLIC BLOOD PRESSURE: 80 MMHG | HEIGHT: 63 IN | WEIGHT: 159 LBS

## 2025-07-08 DIAGNOSIS — R06.83 SNORING: Primary | ICD-10-CM

## 2025-07-08 DIAGNOSIS — G47.10 HYPERSOMNOLENCE: ICD-10-CM

## 2025-07-08 PROCEDURE — 3017F COLORECTAL CA SCREEN DOC REV: CPT | Performed by: PSYCHIATRY & NEUROLOGY

## 2025-07-08 PROCEDURE — 4004F PT TOBACCO SCREEN RCVD TLK: CPT | Performed by: PSYCHIATRY & NEUROLOGY

## 2025-07-08 PROCEDURE — G8399 PT W/DXA RESULTS DOCUMENT: HCPCS | Performed by: PSYCHIATRY & NEUROLOGY

## 2025-07-08 PROCEDURE — 99202 OFFICE O/P NEW SF 15 MIN: CPT | Performed by: PSYCHIATRY & NEUROLOGY

## 2025-07-08 PROCEDURE — G8428 CUR MEDS NOT DOCUMENT: HCPCS | Performed by: PSYCHIATRY & NEUROLOGY

## 2025-07-08 PROCEDURE — 1090F PRES/ABSN URINE INCON ASSESS: CPT | Performed by: PSYCHIATRY & NEUROLOGY

## 2025-07-08 PROCEDURE — G8419 CALC BMI OUT NRM PARAM NOF/U: HCPCS | Performed by: PSYCHIATRY & NEUROLOGY

## 2025-07-08 PROCEDURE — 1123F ACP DISCUSS/DSCN MKR DOCD: CPT | Performed by: PSYCHIATRY & NEUROLOGY

## 2025-07-08 RX ORDER — FAMOTIDINE 40 MG/1
40 TABLET, FILM COATED ORAL DAILY
COMMUNITY
Start: 2025-07-02

## 2025-07-08 RX ORDER — LISINOPRIL 10 MG/1
10 TABLET ORAL DAILY
COMMUNITY
Start: 2024-11-14

## 2025-07-08 RX ORDER — CYCLOBENZAPRINE HCL 5 MG
5 TABLET ORAL 3 TIMES DAILY PRN
COMMUNITY
Start: 2024-12-02

## 2025-07-08 RX ORDER — LEVOTHYROXINE SODIUM 100 UG/1
100 TABLET ORAL DAILY
COMMUNITY
Start: 2025-04-30

## 2025-07-08 RX ORDER — SPIRONOLACTONE 25 MG/1
25 TABLET ORAL DAILY
COMMUNITY
Start: 2025-07-02

## 2025-07-08 RX ORDER — AMLODIPINE BESYLATE 10 MG/1
10 TABLET ORAL DAILY
COMMUNITY
Start: 2024-10-29

## 2025-07-08 RX ORDER — ATORVASTATIN CALCIUM 20 MG/1
20 TABLET, FILM COATED ORAL NIGHTLY
COMMUNITY
Start: 2024-11-22

## 2025-07-08 NOTE — PROGRESS NOTES
[facility]  [address], [place] [address]  Phone [phone]  [phone]     [facility] NEW PATIENT VISIT        Patient: Donna Anderson  :  1955  Age:  70 y.o.  MRN:  [id number]  Account #:  [id number]  Today:  25    Referring Provider: Marissa Hayes MD  [address] [place] [address]  Consulting Provider: Calderon San M.D.    CHIEF COMPLAINT:  Chief Complaint   Patient presents with    New Patient     Patient is here with snoring       History Source: History obtained from the patient.  PCP: Umer Mcghee MD    HISTORY OF PRESENT ILLNESS:  Donna Anderson is a 70 y.o. year old woman with hypertension who was referred for a sleep evaluation.  She has no usual difficulties initiating or maintaining sleep.  She snores very loud.  She gets drowsy in the daytime but does not nap.  She is getting enough sleep at night.  She denies nocturnal diaphoresis, nocturia, and morning headaches.    PAST MEDICAL HITORY:    Past Medical History:   Diagnosis Date    GERD (gastroesophageal reflux disease)     Hyperlipidemia     Hypertension     Hypothyroidism        Past Surgical History:   Procedure Laterality Date    APPENDECTOMY      BREAST BIOPSY Left     DILATION AND CURETTAGE OF UTERUS      KNEE ARTHROSCOPY Bilateral        Recent Hospitalizations  None    Significant Injuries  None    Habits  Donna Anderson reports that she has never smoked. She does not have any smokeless tobacco history on file. She reports current alcohol use. She reports that she does not use drugs.    Current Outpatient Medications   Medication Sig Dispense Refill    amLODIPine (NORVASC) 10 MG tablet Take 1 tablet by mouth daily      atorvastatin (LIPITOR) 20 MG tablet Take 1 tablet by mouth nightly      cyclobenzaprine (FLEXERIL) 5 MG tablet Take 1 tablet by mouth 3 times daily as needed      famotidine (PEPCID) 40 MG tablet Take 1 tablet by mouth daily      levothyroxine

## 2025-07-26 ASSESSMENT — ENCOUNTER SYMPTOMS
PHOTOPHOBIA: 0
NAUSEA: 0
COUGH: 0
BACK PAIN: 0
VOMITING: 0
SHORTNESS OF BREATH: 0

## 2025-07-28 ENCOUNTER — FOLLOWUP TELEPHONE ENCOUNTER (OUTPATIENT)
Dept: SLEEP CENTER | Age: 70
End: 2025-07-28

## 2025-07-28 NOTE — TELEPHONE ENCOUNTER
Reviewed  referral for  home sleep study, called pt with apt, 9/3/25 @ 10:30 am and pt packet mailed

## 2025-08-13 ENCOUNTER — HOSPITAL ENCOUNTER (OUTPATIENT)
Dept: SLEEP CENTER | Age: 70
Discharge: HOME | End: 2025-08-15
Payer: MEDICARE

## 2025-08-13 DIAGNOSIS — R06.83 SNORING: ICD-10-CM

## 2025-08-13 DIAGNOSIS — G47.10 HYPERSOMNOLENCE: ICD-10-CM

## 2025-08-14 PROCEDURE — G0399 HOME SLEEP TEST/TYPE 3 PORTA: HCPCS

## 2025-08-28 PROBLEM — R06.83 SNORING: Status: ACTIVE | Noted: 2025-08-28

## 2025-09-04 ENCOUNTER — FOLLOWUP TELEPHONE ENCOUNTER (OUTPATIENT)
Dept: SLEEP CENTER | Age: 70
End: 2025-09-04

## (undated) DEVICE — YANKAUER,BULB TIP WITH VENT: Brand: ARGYLE

## (undated) DEVICE — GLV SURG BIOGEL LTX PF 6 1/2

## (undated) DEVICE — TRY PREP SCRB VAG PVP

## (undated) DEVICE — FRCP BIOP ENDO CAPTURAPRO SPK SERR 2.8MM 230CM

## (undated) DEVICE — CONMED SCOPE SAVER BITE BLOCK, 20X27 MM: Brand: SCOPE SAVER

## (undated) DEVICE — ADHS LIQ MASTISOL 2/3ML

## (undated) DEVICE — Device: Brand: DEFENDO AIR/WATER/SUCTION AND BIOPSY VALVE

## (undated) DEVICE — CUFF,BP,DISP,1 TUBE,ADULT,HP: Brand: MEDLINE

## (undated) DEVICE — THE CHANNEL CLEANING BRUSH IS A NYLON FLEXI BRUSH ATTACHED TO A FLEXIBLE PLASTIC SHEATH DESIGNED TO SAFELY REMOVE DEBRIS FROM FLEXIBLE ENDOSCOPES.

## (undated) DEVICE — PAD MINOR UNIVERSAL: Brand: MEDLINE INDUSTRIES, INC.

## (undated) DEVICE — SNAR POLYP SENSATION MICRO OVL 13 240X40

## (undated) DEVICE — DEV INFL ALLIANCE2 SYS

## (undated) DEVICE — ESOPHAGEAL BALLOON DILATATION CATHETER: Brand: CRE FIXED WIRE

## (undated) DEVICE — ELECTRD BLD EDGE/INSUL1P 2.4X5.1MM STRL

## (undated) DEVICE — TBG SMPL FLTR LINE NASL 02/C02 A/ BX/100

## (undated) DEVICE — ANTIBACTERIAL UNDYED BRAIDED (POLYGLACTIN 910), SYNTHETIC ABSORBABLE SUTURE: Brand: COATED VICRYL

## (undated) DEVICE — PK TURNOVER RM ADV

## (undated) DEVICE — 3M™ STERI-STRIP™ REINFORCED ADHESIVE SKIN CLOSURES, R1547, 1/2 IN X 4 IN (12 MM X 100 MM), 6 STRIPS/ENVELOPE: Brand: 3M™ STERI-STRIP™

## (undated) DEVICE — SENSR O2 OXIMAX FNGR A/ 18IN NONSTR

## (undated) DEVICE — DRSNG SURESITE WNDW 4X4.5

## (undated) DEVICE — THE SINGLE USE ETRAP – POLYP TRAP IS USED FOR SUCTION RETRIEVAL OF ENDOSCOPICALLY REMOVED POLYPS.: Brand: ETRAP

## (undated) DEVICE — SPNG GZ STRL 2S 4X4 12PLY

## (undated) DEVICE — SNAR POLYP CAPTIVATOR RND STFF 2.4 240CM 10MM 1P/U

## (undated) DEVICE — MASK,OXYGEN,MED CONC,ADLT,7' TUB, UC: Brand: PENDING

## (undated) DEVICE — ENDOCUFF VISION PRP SM 10.4

## (undated) DEVICE — DISPOSABLE SPECIMEN RADIOGRAPHY SYSTEM WITH LOCALIZING GRID, 4.65" RADIOLUCENT GRID: Brand: ACCUGRID